# Patient Record
Sex: MALE | Race: WHITE | Employment: FULL TIME | URBAN - METROPOLITAN AREA
[De-identification: names, ages, dates, MRNs, and addresses within clinical notes are randomized per-mention and may not be internally consistent; named-entity substitution may affect disease eponyms.]

---

## 2017-08-13 ENCOUNTER — APPOINTMENT (OUTPATIENT)
Dept: GENERAL RADIOLOGY | Age: 52
End: 2017-08-13
Payer: COMMERCIAL

## 2017-08-13 ENCOUNTER — HOSPITAL ENCOUNTER (EMERGENCY)
Age: 52
Discharge: HOME | End: 2017-08-13
Attending: EMERGENCY MEDICINE
Payer: COMMERCIAL

## 2017-08-13 VITALS
DIASTOLIC BLOOD PRESSURE: 84 MMHG | RESPIRATION RATE: 16 BRPM | BODY MASS INDEX: 28 KG/M2 | HEIGHT: 71 IN | SYSTOLIC BLOOD PRESSURE: 149 MMHG | OXYGEN SATURATION: 97 % | TEMPERATURE: 98.2 F | WEIGHT: 200 LBS | HEART RATE: 78 BPM

## 2017-08-13 DIAGNOSIS — M77.9 TENDONITIS: Primary | ICD-10-CM

## 2017-08-13 PROCEDURE — 73110 X-RAY EXAM OF WRIST: CPT

## 2017-08-13 PROCEDURE — 6360000002 HC RX W HCPCS: Performed by: EMERGENCY MEDICINE

## 2017-08-13 PROCEDURE — 99283 EMERGENCY DEPT VISIT LOW MDM: CPT

## 2017-08-13 PROCEDURE — 96372 THER/PROPH/DIAG INJ SC/IM: CPT

## 2017-08-13 RX ORDER — NAPROXEN 500 MG/1
500 TABLET ORAL 2 TIMES DAILY WITH MEALS
Qty: 10 TABLET | Refills: 0 | Status: SHIPPED | OUTPATIENT
Start: 2017-08-13 | End: 2021-07-12

## 2017-08-13 RX ORDER — KETOROLAC TROMETHAMINE 30 MG/ML
30 INJECTION, SOLUTION INTRAMUSCULAR; INTRAVENOUS ONCE
Status: COMPLETED | OUTPATIENT
Start: 2017-08-13 | End: 2017-08-13

## 2017-08-13 RX ADMIN — KETOROLAC TROMETHAMINE 30 MG: 30 INJECTION, SOLUTION INTRAMUSCULAR at 21:56

## 2017-08-13 ASSESSMENT — PAIN DESCRIPTION - FREQUENCY: FREQUENCY: CONTINUOUS

## 2017-08-13 ASSESSMENT — ENCOUNTER SYMPTOMS
ABDOMINAL PAIN: 0
NAUSEA: 0
EYE PAIN: 0
BACK PAIN: 0
BLOOD IN STOOL: 0
TROUBLE SWALLOWING: 0
DIARRHEA: 0
WHEEZING: 0
RHINORRHEA: 0
VOMITING: 0
COUGH: 0
CHEST TIGHTNESS: 0
SHORTNESS OF BREATH: 0

## 2017-08-13 ASSESSMENT — PAIN SCALES - GENERAL: PAINLEVEL_OUTOF10: 9

## 2017-08-13 ASSESSMENT — PAIN DESCRIPTION - ONSET: ONSET: SUDDEN

## 2017-08-13 ASSESSMENT — PAIN DESCRIPTION - ORIENTATION: ORIENTATION: LEFT

## 2017-08-13 ASSESSMENT — PAIN DESCRIPTION - LOCATION: LOCATION: WRIST

## 2017-08-13 ASSESSMENT — PAIN DESCRIPTION - DESCRIPTORS: DESCRIPTORS: SHARP;SHOOTING;THROBBING;STABBING

## 2017-08-14 ENCOUNTER — HOSPITAL ENCOUNTER (EMERGENCY)
Age: 52
Discharge: HOME | End: 2017-08-14
Attending: EMERGENCY MEDICINE
Payer: COMMERCIAL

## 2017-08-14 ENCOUNTER — APPOINTMENT (OUTPATIENT)
Dept: GENERAL RADIOLOGY | Age: 52
End: 2017-08-14
Payer: COMMERCIAL

## 2017-08-14 VITALS
HEART RATE: 80 BPM | WEIGHT: 200 LBS | BODY MASS INDEX: 28 KG/M2 | TEMPERATURE: 98.2 F | DIASTOLIC BLOOD PRESSURE: 100 MMHG | SYSTOLIC BLOOD PRESSURE: 190 MMHG | RESPIRATION RATE: 16 BRPM | HEIGHT: 71 IN | OXYGEN SATURATION: 98 %

## 2017-08-14 DIAGNOSIS — S63.095S: ICD-10-CM

## 2017-08-14 DIAGNOSIS — M25.432 WRIST EFFUSION, LEFT: ICD-10-CM

## 2017-08-14 DIAGNOSIS — S60.212A CONTUSION OF LEFT WRIST, INITIAL ENCOUNTER: Primary | ICD-10-CM

## 2017-08-14 PROCEDURE — 73110 X-RAY EXAM OF WRIST: CPT

## 2017-08-14 PROCEDURE — 6370000000 HC RX 637 (ALT 250 FOR IP): Performed by: EMERGENCY MEDICINE

## 2017-08-14 PROCEDURE — 99283 EMERGENCY DEPT VISIT LOW MDM: CPT

## 2017-08-14 PROCEDURE — 6360000002 HC RX W HCPCS: Performed by: EMERGENCY MEDICINE

## 2017-08-14 RX ORDER — DEXAMETHASONE 4 MG/1
8 TABLET ORAL ONCE
Status: COMPLETED | OUTPATIENT
Start: 2017-08-14 | End: 2017-08-14

## 2017-08-14 RX ORDER — HYDROCODONE BITARTRATE AND ACETAMINOPHEN 5; 325 MG/1; MG/1
1 TABLET ORAL EVERY 6 HOURS PRN
Qty: 20 TABLET | Refills: 0 | Status: SHIPPED | OUTPATIENT
Start: 2017-08-14 | End: 2017-08-21

## 2017-08-14 RX ORDER — TRAMADOL HYDROCHLORIDE 50 MG/1
100 TABLET ORAL ONCE
Status: COMPLETED | OUTPATIENT
Start: 2017-08-14 | End: 2017-08-14

## 2017-08-14 RX ADMIN — DEXAMETHASONE 8 MG: 4 TABLET ORAL at 11:52

## 2017-08-14 RX ADMIN — TRAMADOL HYDROCHLORIDE 100 MG: 50 TABLET, COATED ORAL at 11:52

## 2017-08-14 ASSESSMENT — ENCOUNTER SYMPTOMS
DIARRHEA: 0
EYE REDNESS: 0
WHEEZING: 0
VOMITING: 0
STRIDOR: 0
BLOOD IN STOOL: 0
SHORTNESS OF BREATH: 0
SORE THROAT: 0
FACIAL SWELLING: 0
EYE PAIN: 0
COUGH: 0
TROUBLE SWALLOWING: 0
EYE DISCHARGE: 0
VOICE CHANGE: 0
SINUS PRESSURE: 0
BACK PAIN: 0
ABDOMINAL PAIN: 0
CONSTIPATION: 0
CHEST TIGHTNESS: 0
CHOKING: 0

## 2017-08-14 ASSESSMENT — PAIN SCALES - GENERAL
PAINLEVEL_OUTOF10: 10
PAINLEVEL_OUTOF10: 10

## 2017-08-14 ASSESSMENT — PAIN DESCRIPTION - LOCATION: LOCATION: WRIST

## 2017-08-14 ASSESSMENT — PAIN DESCRIPTION - ORIENTATION: ORIENTATION: LEFT

## 2017-08-14 ASSESSMENT — PAIN DESCRIPTION - DESCRIPTORS: DESCRIPTORS: SORE

## 2018-04-12 ENCOUNTER — HOSPITAL ENCOUNTER (EMERGENCY)
Age: 53
Discharge: HOME | End: 2018-04-12
Attending: EMERGENCY MEDICINE
Payer: COMMERCIAL

## 2018-04-12 ENCOUNTER — APPOINTMENT (OUTPATIENT)
Dept: CT IMAGING | Age: 53
End: 2018-04-12
Payer: COMMERCIAL

## 2018-04-12 VITALS
TEMPERATURE: 98.1 F | WEIGHT: 200 LBS | BODY MASS INDEX: 28.63 KG/M2 | OXYGEN SATURATION: 98 % | HEART RATE: 66 BPM | RESPIRATION RATE: 16 BRPM | SYSTOLIC BLOOD PRESSURE: 162 MMHG | HEIGHT: 70 IN | DIASTOLIC BLOOD PRESSURE: 90 MMHG

## 2018-04-12 DIAGNOSIS — K59.00 OBSTIPATION: Primary | ICD-10-CM

## 2018-04-12 LAB
ALBUMIN SERPL-MCNC: 4.1 G/DL (ref 3.9–4.9)
ALP BLD-CCNC: 86 U/L (ref 35–104)
ALT SERPL-CCNC: 33 U/L (ref 0–41)
ANION GAP SERPL CALCULATED.3IONS-SCNC: 15 MEQ/L (ref 7–13)
AST SERPL-CCNC: 20 U/L (ref 0–40)
BASOPHILS ABSOLUTE: 0.1 K/UL (ref 0–0.2)
BASOPHILS RELATIVE PERCENT: 0.5 %
BILIRUB SERPL-MCNC: 0.6 MG/DL (ref 0–1.2)
BUN BLDV-MCNC: 11 MG/DL (ref 6–20)
CALCIUM SERPL-MCNC: 9.6 MG/DL (ref 8.6–10.2)
CHLORIDE BLD-SCNC: 97 MEQ/L (ref 98–107)
CO2: 25 MEQ/L (ref 22–29)
CREAT SERPL-MCNC: 0.73 MG/DL (ref 0.7–1.2)
EOSINOPHILS ABSOLUTE: 0.4 K/UL (ref 0–0.7)
EOSINOPHILS RELATIVE PERCENT: 3.6 %
GFR AFRICAN AMERICAN: >60
GFR NON-AFRICAN AMERICAN: >60
GLOBULIN: 3.5 G/DL (ref 2.3–3.5)
GLUCOSE BLD-MCNC: 182 MG/DL (ref 74–109)
HCT VFR BLD CALC: 45.9 % (ref 42–52)
HEMOGLOBIN: 15.9 G/DL (ref 14–18)
LIPASE: 19 U/L (ref 13–60)
LYMPHOCYTES ABSOLUTE: 3.7 K/UL (ref 1–4.8)
LYMPHOCYTES RELATIVE PERCENT: 31.6 %
MAGNESIUM: 1.9 MG/DL (ref 1.7–2.3)
MCH RBC QN AUTO: 30.6 PG (ref 27–31.3)
MCHC RBC AUTO-ENTMCNC: 34.6 % (ref 33–37)
MCV RBC AUTO: 88.6 FL (ref 80–100)
MONOCYTES ABSOLUTE: 0.8 K/UL (ref 0.2–0.8)
MONOCYTES RELATIVE PERCENT: 7.3 %
NEUTROPHILS ABSOLUTE: 6.6 K/UL (ref 1.4–6.5)
NEUTROPHILS RELATIVE PERCENT: 57 %
PDW BLD-RTO: 12.8 % (ref 11.5–14.5)
PLATELET # BLD: 208 K/UL (ref 130–400)
POTASSIUM SERPL-SCNC: 4.3 MEQ/L (ref 3.5–5.1)
RBC # BLD: 5.18 M/UL (ref 4.7–6.1)
SODIUM BLD-SCNC: 137 MEQ/L (ref 132–144)
TOTAL PROTEIN: 7.6 G/DL (ref 6.4–8.1)
WBC # BLD: 11.6 K/UL (ref 4.8–10.8)

## 2018-04-12 PROCEDURE — 83735 ASSAY OF MAGNESIUM: CPT

## 2018-04-12 PROCEDURE — 80053 COMPREHEN METABOLIC PANEL: CPT

## 2018-04-12 PROCEDURE — 99284 EMERGENCY DEPT VISIT MOD MDM: CPT

## 2018-04-12 PROCEDURE — 74176 CT ABD & PELVIS W/O CONTRAST: CPT

## 2018-04-12 PROCEDURE — 83690 ASSAY OF LIPASE: CPT

## 2018-04-12 PROCEDURE — 6360000002 HC RX W HCPCS: Performed by: EMERGENCY MEDICINE

## 2018-04-12 PROCEDURE — 36415 COLL VENOUS BLD VENIPUNCTURE: CPT

## 2018-04-12 PROCEDURE — 85025 COMPLETE CBC W/AUTO DIFF WBC: CPT

## 2018-04-12 PROCEDURE — 96374 THER/PROPH/DIAG INJ IV PUSH: CPT

## 2018-04-12 PROCEDURE — 2580000003 HC RX 258: Performed by: EMERGENCY MEDICINE

## 2018-04-12 RX ORDER — DOCUSATE SODIUM 100 MG/1
100 CAPSULE, LIQUID FILLED ORAL 2 TIMES DAILY
Qty: 30 CAPSULE | Refills: 0 | Status: SHIPPED | OUTPATIENT
Start: 2018-04-12

## 2018-04-12 RX ORDER — ORPHENADRINE CITRATE 100 MG/1
100 TABLET, EXTENDED RELEASE ORAL 2 TIMES DAILY
Qty: 20 TABLET | Refills: 0 | Status: SHIPPED | OUTPATIENT
Start: 2018-04-12 | End: 2018-04-22

## 2018-04-12 RX ORDER — KETOROLAC TROMETHAMINE 10 MG/1
10 TABLET, FILM COATED ORAL EVERY 6 HOURS PRN
Qty: 20 TABLET | Refills: 0 | Status: SHIPPED | OUTPATIENT
Start: 2018-04-12 | End: 2018-06-21 | Stop reason: ALTCHOICE

## 2018-04-12 RX ORDER — 0.9 % SODIUM CHLORIDE 0.9 %
1000 INTRAVENOUS SOLUTION INTRAVENOUS ONCE
Status: COMPLETED | OUTPATIENT
Start: 2018-04-12 | End: 2018-04-12

## 2018-04-12 RX ORDER — KETOROLAC TROMETHAMINE 30 MG/ML
30 INJECTION, SOLUTION INTRAMUSCULAR; INTRAVENOUS ONCE
Status: COMPLETED | OUTPATIENT
Start: 2018-04-12 | End: 2018-04-12

## 2018-04-12 RX ADMIN — KETOROLAC TROMETHAMINE 30 MG: 30 INJECTION, SOLUTION INTRAMUSCULAR at 16:27

## 2018-04-12 RX ADMIN — SODIUM CHLORIDE 1000 ML: 9 INJECTION, SOLUTION INTRAVENOUS at 16:27

## 2018-04-12 ASSESSMENT — PAIN SCALES - GENERAL
PAINLEVEL_OUTOF10: 4
PAINLEVEL_OUTOF10: 8
PAINLEVEL_OUTOF10: 10

## 2018-04-12 ASSESSMENT — ENCOUNTER SYMPTOMS
COUGH: 0
VOMITING: 0
ABDOMINAL PAIN: 0
RHINORRHEA: 0
EYE PAIN: 0
PHOTOPHOBIA: 0
SORE THROAT: 0
APNEA: 0
CONSTIPATION: 0
NAUSEA: 0
WHEEZING: 0
DIARRHEA: 0
SHORTNESS OF BREATH: 0
SINUS PRESSURE: 0
ABDOMINAL DISTENTION: 0
COLOR CHANGE: 0
BACK PAIN: 0

## 2018-04-12 ASSESSMENT — PAIN DESCRIPTION - DESCRIPTORS
DESCRIPTORS: STABBING
DESCRIPTORS: ACHING
DESCRIPTORS: ACHING

## 2018-04-12 ASSESSMENT — PAIN - FUNCTIONAL ASSESSMENT: PAIN_FUNCTIONAL_ASSESSMENT: 0-10

## 2018-04-12 ASSESSMENT — PAIN DESCRIPTION - ORIENTATION
ORIENTATION: LEFT
ORIENTATION: RIGHT
ORIENTATION: LEFT

## 2018-04-12 ASSESSMENT — PAIN DESCRIPTION - LOCATION
LOCATION: FLANK

## 2018-04-12 ASSESSMENT — PAIN DESCRIPTION - PAIN TYPE: TYPE: ACUTE PAIN

## 2018-04-12 ASSESSMENT — PAIN DESCRIPTION - PROGRESSION: CLINICAL_PROGRESSION: GRADUALLY IMPROVING

## 2018-04-12 ASSESSMENT — PAIN DESCRIPTION - ONSET: ONSET: SUDDEN

## 2018-04-12 ASSESSMENT — PAIN DESCRIPTION - FREQUENCY
FREQUENCY: CONTINUOUS
FREQUENCY: CONTINUOUS

## 2018-04-16 ENCOUNTER — OFFICE VISIT (OUTPATIENT)
Dept: FAMILY MEDICINE CLINIC | Age: 53
End: 2018-04-16
Payer: COMMERCIAL

## 2018-04-16 VITALS
RESPIRATION RATE: 16 BRPM | WEIGHT: 213 LBS | SYSTOLIC BLOOD PRESSURE: 152 MMHG | HEIGHT: 71 IN | OXYGEN SATURATION: 98 % | DIASTOLIC BLOOD PRESSURE: 90 MMHG | HEART RATE: 84 BPM | BODY MASS INDEX: 29.82 KG/M2 | TEMPERATURE: 96.9 F

## 2018-04-16 DIAGNOSIS — Z00.00 ANNUAL PHYSICAL EXAM: ICD-10-CM

## 2018-04-16 DIAGNOSIS — L81.4 SOLAR LENTIGO: ICD-10-CM

## 2018-04-16 DIAGNOSIS — R20.2 PARESTHESIA OF LEFT LOWER EXTREMITY: ICD-10-CM

## 2018-04-16 DIAGNOSIS — R73.9 HYPERGLYCEMIA: ICD-10-CM

## 2018-04-16 DIAGNOSIS — M47.817 LUMBOSACRAL SPONDYLOSIS WITHOUT MYELOPATHY: ICD-10-CM

## 2018-04-16 DIAGNOSIS — M72.0 DUPUYTREN'S CONTRACTURE OF HAND: ICD-10-CM

## 2018-04-16 DIAGNOSIS — M67.471 GANGLION CYST OF RIGHT FOOT: ICD-10-CM

## 2018-04-16 DIAGNOSIS — M17.11 ARTHRITIS OF RIGHT KNEE: ICD-10-CM

## 2018-04-16 DIAGNOSIS — Z13.220 SCREENING, LIPID: ICD-10-CM

## 2018-04-16 DIAGNOSIS — Z96.652 S/P TKR (TOTAL KNEE REPLACEMENT), LEFT: Primary | ICD-10-CM

## 2018-04-16 PROCEDURE — 3017F COLORECTAL CA SCREEN DOC REV: CPT | Performed by: FAMILY MEDICINE

## 2018-04-16 PROCEDURE — 99204 OFFICE O/P NEW MOD 45 MIN: CPT | Performed by: FAMILY MEDICINE

## 2018-04-16 PROCEDURE — G8427 DOCREV CUR MEDS BY ELIG CLIN: HCPCS | Performed by: FAMILY MEDICINE

## 2018-04-16 PROCEDURE — G8419 CALC BMI OUT NRM PARAM NOF/U: HCPCS | Performed by: FAMILY MEDICINE

## 2018-04-16 PROCEDURE — 4004F PT TOBACCO SCREEN RCVD TLK: CPT | Performed by: FAMILY MEDICINE

## 2018-04-16 ASSESSMENT — PATIENT HEALTH QUESTIONNAIRE - PHQ9
1. LITTLE INTEREST OR PLEASURE IN DOING THINGS: 0
SUM OF ALL RESPONSES TO PHQ QUESTIONS 1-9: 0
2. FEELING DOWN, DEPRESSED OR HOPELESS: 0
SUM OF ALL RESPONSES TO PHQ9 QUESTIONS 1 & 2: 0

## 2018-05-22 LAB
ALBUMIN SERPL-MCNC: 4.3 G/DL
ALP BLD-CCNC: 84 U/L
ALT SERPL-CCNC: 34 U/L
ANION GAP SERPL CALCULATED.3IONS-SCNC: NORMAL MMOL/L
AST SERPL-CCNC: 23 U/L
AVERAGE GLUCOSE: NORMAL
BASOPHILS ABSOLUTE: 0.1 /ΜL
BASOPHILS RELATIVE PERCENT: 1 %
BILIRUB SERPL-MCNC: 0.4 MG/DL (ref 0.1–1.4)
BUN BLDV-MCNC: 15 MG/DL
CALCIUM SERPL-MCNC: 9.3 MG/DL
CHLORIDE BLD-SCNC: 100 MMOL/L
CHOLESTEROL, TOTAL: 177 MG/DL
CHOLESTEROL/HDL RATIO: NORMAL
CO2: 23 MMOL/L
CREAT SERPL-MCNC: 0.8 MG/DL
EOSINOPHILS ABSOLUTE: 0.5 /ΜL
EOSINOPHILS RELATIVE PERCENT: 5 %
GFR CALCULATED: NORMAL
GLUCOSE BLD-MCNC: 159 MG/DL
HBA1C MFR BLD: 8.7 %
HCT VFR BLD CALC: 44.7 % (ref 41–53)
HDLC SERPL-MCNC: 40 MG/DL (ref 35–70)
HEMOGLOBIN: 15.7 G/DL (ref 13.5–17.5)
LDL CHOLESTEROL CALCULATED: 111 MG/DL (ref 0–160)
LYMPHOCYTES ABSOLUTE: 3.3 /ΜL
LYMPHOCYTES RELATIVE PERCENT: 34 %
MCH RBC QN AUTO: 30.8 PG
MCHC RBC AUTO-ENTMCNC: 35.1 G/DL
MCV RBC AUTO: 88 FL
MONOCYTES ABSOLUTE: 0.5 /ΜL
MONOCYTES RELATIVE PERCENT: 5 %
NEUTROPHILS ABSOLUTE: 5.5 /ΜL
NEUTROPHILS RELATIVE PERCENT: 55 %
PDW BLD-RTO: 13.2 %
PLATELET # BLD: 261 K/ΜL
PMV BLD AUTO: NORMAL FL
POTASSIUM SERPL-SCNC: 4.7 MMOL/L
RBC # BLD: 5.09 10^6/ΜL
SODIUM BLD-SCNC: 138 MMOL/L
TOTAL PROTEIN: 7.2
TRIGL SERPL-MCNC: 131 MG/DL
VLDLC SERPL CALC-MCNC: 26 MG/DL
WBC # BLD: 9.9 10^3/ML

## 2018-05-24 ENCOUNTER — OFFICE VISIT (OUTPATIENT)
Dept: FAMILY MEDICINE CLINIC | Age: 53
End: 2018-05-24
Payer: COMMERCIAL

## 2018-05-24 VITALS
HEIGHT: 71 IN | BODY MASS INDEX: 29.09 KG/M2 | WEIGHT: 207.8 LBS | HEART RATE: 82 BPM | DIASTOLIC BLOOD PRESSURE: 100 MMHG | SYSTOLIC BLOOD PRESSURE: 150 MMHG | OXYGEN SATURATION: 96 % | TEMPERATURE: 98.4 F

## 2018-05-24 DIAGNOSIS — E11.9 TYPE 2 DIABETES MELLITUS WITHOUT COMPLICATION, WITHOUT LONG-TERM CURRENT USE OF INSULIN (HCC): Primary | ICD-10-CM

## 2018-05-24 DIAGNOSIS — M54.16 LEFT LUMBAR RADICULOPATHY: ICD-10-CM

## 2018-05-24 DIAGNOSIS — M47.817 LUMBOSACRAL SPONDYLOSIS WITHOUT MYELOPATHY: ICD-10-CM

## 2018-05-24 PROCEDURE — 99214 OFFICE O/P EST MOD 30 MIN: CPT | Performed by: FAMILY MEDICINE

## 2018-05-24 PROCEDURE — G8428 CUR MEDS NOT DOCUMENT: HCPCS | Performed by: FAMILY MEDICINE

## 2018-05-24 PROCEDURE — 3017F COLORECTAL CA SCREEN DOC REV: CPT | Performed by: FAMILY MEDICINE

## 2018-05-24 PROCEDURE — 4004F PT TOBACCO SCREEN RCVD TLK: CPT | Performed by: FAMILY MEDICINE

## 2018-05-24 PROCEDURE — G8419 CALC BMI OUT NRM PARAM NOF/U: HCPCS | Performed by: FAMILY MEDICINE

## 2018-05-24 PROCEDURE — 2022F DILAT RTA XM EVC RTNOPTHY: CPT | Performed by: FAMILY MEDICINE

## 2018-05-24 PROCEDURE — 3045F PR MOST RECENT HEMOGLOBIN A1C LEVEL 7.0-9.0%: CPT | Performed by: FAMILY MEDICINE

## 2018-05-24 RX ORDER — TRAMADOL HYDROCHLORIDE 50 MG/1
50 TABLET ORAL EVERY 6 HOURS PRN
Qty: 28 TABLET | Refills: 0 | Status: SHIPPED | OUTPATIENT
Start: 2018-05-24 | End: 2018-05-31

## 2018-05-24 RX ORDER — METFORMIN HYDROCHLORIDE 500 MG/1
500 TABLET, EXTENDED RELEASE ORAL
Qty: 30 TABLET | Refills: 5 | Status: SHIPPED | OUTPATIENT
Start: 2018-05-24 | End: 2019-05-24

## 2018-06-21 ENCOUNTER — OFFICE VISIT (OUTPATIENT)
Dept: FAMILY MEDICINE CLINIC | Age: 53
End: 2018-06-21
Payer: COMMERCIAL

## 2018-06-21 VITALS
HEIGHT: 71 IN | OXYGEN SATURATION: 98 % | DIASTOLIC BLOOD PRESSURE: 84 MMHG | BODY MASS INDEX: 28.92 KG/M2 | HEART RATE: 66 BPM | TEMPERATURE: 99.1 F | WEIGHT: 206.6 LBS | SYSTOLIC BLOOD PRESSURE: 164 MMHG

## 2018-06-21 DIAGNOSIS — E11.9 TYPE 2 DIABETES MELLITUS WITHOUT COMPLICATION, WITHOUT LONG-TERM CURRENT USE OF INSULIN (HCC): Primary | ICD-10-CM

## 2018-06-21 DIAGNOSIS — I10 ESSENTIAL HYPERTENSION: ICD-10-CM

## 2018-06-21 DIAGNOSIS — G89.29 CHRONIC PAIN OF LEFT KNEE: ICD-10-CM

## 2018-06-21 DIAGNOSIS — M47.817 LUMBOSACRAL SPONDYLOSIS WITHOUT MYELOPATHY: ICD-10-CM

## 2018-06-21 DIAGNOSIS — M25.562 CHRONIC PAIN OF LEFT KNEE: ICD-10-CM

## 2018-06-21 PROCEDURE — 2022F DILAT RTA XM EVC RTNOPTHY: CPT | Performed by: FAMILY MEDICINE

## 2018-06-21 PROCEDURE — G8427 DOCREV CUR MEDS BY ELIG CLIN: HCPCS | Performed by: FAMILY MEDICINE

## 2018-06-21 PROCEDURE — 3017F COLORECTAL CA SCREEN DOC REV: CPT | Performed by: FAMILY MEDICINE

## 2018-06-21 PROCEDURE — G8419 CALC BMI OUT NRM PARAM NOF/U: HCPCS | Performed by: FAMILY MEDICINE

## 2018-06-21 PROCEDURE — 99214 OFFICE O/P EST MOD 30 MIN: CPT | Performed by: FAMILY MEDICINE

## 2018-06-21 PROCEDURE — 3045F PR MOST RECENT HEMOGLOBIN A1C LEVEL 7.0-9.0%: CPT | Performed by: FAMILY MEDICINE

## 2018-06-21 PROCEDURE — 4004F PT TOBACCO SCREEN RCVD TLK: CPT | Performed by: FAMILY MEDICINE

## 2018-06-21 RX ORDER — LISINOPRIL 20 MG/1
20 TABLET ORAL DAILY
Qty: 30 TABLET | Refills: 5 | Status: SHIPPED | OUTPATIENT
Start: 2018-06-21

## 2018-06-21 RX ORDER — HYDROCODONE BITARTRATE AND ACETAMINOPHEN 5; 325 MG/1; MG/1
1 TABLET ORAL EVERY 6 HOURS PRN
Qty: 28 TABLET | Refills: 0 | Status: SHIPPED | OUTPATIENT
Start: 2018-06-21 | End: 2018-06-28

## 2018-06-25 DIAGNOSIS — R20.2 PARESTHESIA OF LEFT LOWER EXTREMITY: Primary | ICD-10-CM

## 2018-07-22 NOTE — PROGRESS NOTES
Labs are abnormal.  Drug screen is abnormal.  Positive for oxycodone and negative for hydrocodone. Please question patient about where he is obtaining the oxycodone from.

## 2018-07-23 ENCOUNTER — TELEPHONE (OUTPATIENT)
Dept: FAMILY MEDICINE CLINIC | Age: 53
End: 2018-07-23

## 2018-07-23 NOTE — TELEPHONE ENCOUNTER
THE PATIENT RETURNED A PHONE CALL HE STATED WAS RECEIVED FROM A MA FROM OUR OFFICE INQUIRING ABOUT HIS CURRENT MEDICATIONS. THE PATIENT IS SCHEDULED TO COME IN FOR AN APPOINTMENT 7/25/18 AND WILL PROVIDE THE MEDICATIONS AT THAT TIME.

## 2020-09-09 ENCOUNTER — APPOINTMENT (OUTPATIENT)
Dept: CT IMAGING | Age: 55
End: 2020-09-09
Payer: COMMERCIAL

## 2020-09-09 ENCOUNTER — APPOINTMENT (OUTPATIENT)
Dept: GENERAL RADIOLOGY | Age: 55
End: 2020-09-09
Payer: COMMERCIAL

## 2020-09-09 ENCOUNTER — HOSPITAL ENCOUNTER (EMERGENCY)
Age: 55
Discharge: HOME | End: 2020-09-09
Payer: COMMERCIAL

## 2020-09-09 VITALS
WEIGHT: 200 LBS | HEART RATE: 56 BPM | RESPIRATION RATE: 20 BRPM | BODY MASS INDEX: 28 KG/M2 | HEIGHT: 71 IN | OXYGEN SATURATION: 99 % | SYSTOLIC BLOOD PRESSURE: 182 MMHG | TEMPERATURE: 97.9 F | DIASTOLIC BLOOD PRESSURE: 97 MMHG

## 2020-09-09 LAB
ALBUMIN SERPL-MCNC: 3.9 G/DL (ref 3.5–4.6)
ALP BLD-CCNC: 97 U/L (ref 35–104)
ALT SERPL-CCNC: 20 U/L (ref 0–41)
ANION GAP SERPL CALCULATED.3IONS-SCNC: 9 MEQ/L (ref 9–15)
AST SERPL-CCNC: 39 U/L (ref 0–40)
BASOPHILS ABSOLUTE: 0.1 K/UL (ref 0–0.2)
BASOPHILS RELATIVE PERCENT: 1.1 %
BILIRUB SERPL-MCNC: 0.4 MG/DL (ref 0.2–0.7)
BILIRUBIN URINE: NEGATIVE
BLOOD, URINE: NEGATIVE
BUN BLDV-MCNC: 14 MG/DL (ref 6–20)
CALCIUM SERPL-MCNC: 8.9 MG/DL (ref 8.5–9.9)
CHLORIDE BLD-SCNC: 100 MEQ/L (ref 95–107)
CLARITY: CLEAR
CO2: 27 MEQ/L (ref 20–31)
COLOR: YELLOW
CREAT SERPL-MCNC: 0.68 MG/DL (ref 0.7–1.2)
EKG ATRIAL RATE: 66 BPM
EKG P AXIS: 82 DEGREES
EKG P-R INTERVAL: 152 MS
EKG Q-T INTERVAL: 406 MS
EKG QRS DURATION: 82 MS
EKG QTC CALCULATION (BAZETT): 425 MS
EKG R AXIS: 77 DEGREES
EKG T AXIS: 59 DEGREES
EKG VENTRICULAR RATE: 66 BPM
EOSINOPHILS ABSOLUTE: 0.4 K/UL (ref 0–0.7)
EOSINOPHILS RELATIVE PERCENT: 4.3 %
GFR AFRICAN AMERICAN: >60
GFR NON-AFRICAN AMERICAN: >60
GLOBULIN: 3.5 G/DL (ref 2.3–3.5)
GLUCOSE BLD-MCNC: 130 MG/DL (ref 70–99)
GLUCOSE URINE: 250 MG/DL
HCT VFR BLD CALC: 47.2 % (ref 42–52)
HEMOGLOBIN: 16.1 G/DL (ref 14–18)
KETONES, URINE: NEGATIVE MG/DL
LEUKOCYTE ESTERASE, URINE: NEGATIVE
LIPASE: 21 U/L (ref 12–95)
LYMPHOCYTES ABSOLUTE: 2.6 K/UL (ref 1–4.8)
LYMPHOCYTES RELATIVE PERCENT: 24.8 %
MAGNESIUM: 2 MG/DL (ref 1.7–2.4)
MCH RBC QN AUTO: 31 PG (ref 27–31.3)
MCHC RBC AUTO-ENTMCNC: 34.2 % (ref 33–37)
MCV RBC AUTO: 90.8 FL (ref 80–100)
MONOCYTES ABSOLUTE: 0.9 K/UL (ref 0.2–0.8)
MONOCYTES RELATIVE PERCENT: 8.8 %
NEUTROPHILS ABSOLUTE: 6.4 K/UL (ref 1.4–6.5)
NEUTROPHILS RELATIVE PERCENT: 61 %
NITRITE, URINE: NEGATIVE
PDW BLD-RTO: 14 % (ref 11.5–14.5)
PH UA: 5.5 (ref 5–9)
PLATELET # BLD: 202 K/UL (ref 130–400)
POTASSIUM SERPL-SCNC: 3.9 MEQ/L (ref 3.4–4.9)
POTASSIUM SERPL-SCNC: 5.7 MEQ/L (ref 3.4–4.9)
PROTEIN UA: NEGATIVE MG/DL
RBC # BLD: 5.2 M/UL (ref 4.7–6.1)
SODIUM BLD-SCNC: 136 MEQ/L (ref 135–144)
SPECIFIC GRAVITY UA: 1.03 (ref 1–1.03)
TOTAL PROTEIN: 7.4 G/DL (ref 6.3–8)
TROPONIN: <0.01 NG/ML (ref 0–0.01)
URINE REFLEX TO CULTURE: ABNORMAL
UROBILINOGEN, URINE: 1 E.U./DL
WBC # BLD: 10.4 K/UL (ref 4.8–10.8)

## 2020-09-09 PROCEDURE — 83735 ASSAY OF MAGNESIUM: CPT

## 2020-09-09 PROCEDURE — 96361 HYDRATE IV INFUSION ADD-ON: CPT

## 2020-09-09 PROCEDURE — 84132 ASSAY OF SERUM POTASSIUM: CPT

## 2020-09-09 PROCEDURE — 85025 COMPLETE CBC W/AUTO DIFF WBC: CPT

## 2020-09-09 PROCEDURE — 71045 X-RAY EXAM CHEST 1 VIEW: CPT

## 2020-09-09 PROCEDURE — 36415 COLL VENOUS BLD VENIPUNCTURE: CPT

## 2020-09-09 PROCEDURE — 80053 COMPREHEN METABOLIC PANEL: CPT

## 2020-09-09 PROCEDURE — 99285 EMERGENCY DEPT VISIT HI MDM: CPT

## 2020-09-09 PROCEDURE — 96374 THER/PROPH/DIAG INJ IV PUSH: CPT

## 2020-09-09 PROCEDURE — 84484 ASSAY OF TROPONIN QUANT: CPT

## 2020-09-09 PROCEDURE — 6370000000 HC RX 637 (ALT 250 FOR IP): Performed by: STUDENT IN AN ORGANIZED HEALTH CARE EDUCATION/TRAINING PROGRAM

## 2020-09-09 PROCEDURE — 2580000003 HC RX 258: Performed by: STUDENT IN AN ORGANIZED HEALTH CARE EDUCATION/TRAINING PROGRAM

## 2020-09-09 PROCEDURE — 93005 ELECTROCARDIOGRAM TRACING: CPT | Performed by: STUDENT IN AN ORGANIZED HEALTH CARE EDUCATION/TRAINING PROGRAM

## 2020-09-09 PROCEDURE — 81003 URINALYSIS AUTO W/O SCOPE: CPT

## 2020-09-09 PROCEDURE — 96375 TX/PRO/DX INJ NEW DRUG ADDON: CPT

## 2020-09-09 PROCEDURE — 74176 CT ABD & PELVIS W/O CONTRAST: CPT

## 2020-09-09 PROCEDURE — 83690 ASSAY OF LIPASE: CPT

## 2020-09-09 PROCEDURE — 6360000002 HC RX W HCPCS: Performed by: STUDENT IN AN ORGANIZED HEALTH CARE EDUCATION/TRAINING PROGRAM

## 2020-09-09 RX ORDER — LISINOPRIL 10 MG/1
20 TABLET ORAL ONCE
Status: COMPLETED | OUTPATIENT
Start: 2020-09-09 | End: 2020-09-09

## 2020-09-09 RX ORDER — ONDANSETRON 2 MG/ML
4 INJECTION INTRAMUSCULAR; INTRAVENOUS ONCE
Status: COMPLETED | OUTPATIENT
Start: 2020-09-09 | End: 2020-09-09

## 2020-09-09 RX ORDER — MORPHINE SULFATE 2 MG/ML
4 INJECTION, SOLUTION INTRAMUSCULAR; INTRAVENOUS
Status: DISCONTINUED | OUTPATIENT
Start: 2020-09-09 | End: 2020-09-09 | Stop reason: HOSPADM

## 2020-09-09 RX ORDER — KETOROLAC TROMETHAMINE 30 MG/ML
30 INJECTION, SOLUTION INTRAMUSCULAR; INTRAVENOUS ONCE
Status: COMPLETED | OUTPATIENT
Start: 2020-09-09 | End: 2020-09-09

## 2020-09-09 RX ORDER — HYDRALAZINE HYDROCHLORIDE 20 MG/ML
5 INJECTION INTRAMUSCULAR; INTRAVENOUS ONCE
Status: DISCONTINUED | OUTPATIENT
Start: 2020-09-09 | End: 2020-09-09

## 2020-09-09 RX ORDER — 0.9 % SODIUM CHLORIDE 0.9 %
1000 INTRAVENOUS SOLUTION INTRAVENOUS ONCE
Status: COMPLETED | OUTPATIENT
Start: 2020-09-09 | End: 2020-09-09

## 2020-09-09 RX ADMIN — KETOROLAC TROMETHAMINE 30 MG: 30 INJECTION, SOLUTION INTRAMUSCULAR at 19:07

## 2020-09-09 RX ADMIN — ONDANSETRON 4 MG: 2 INJECTION INTRAMUSCULAR; INTRAVENOUS at 19:07

## 2020-09-09 RX ADMIN — LISINOPRIL 20 MG: 10 TABLET ORAL at 20:51

## 2020-09-09 RX ADMIN — SODIUM CHLORIDE 1000 ML: 9 INJECTION, SOLUTION INTRAVENOUS at 19:07

## 2020-09-09 ASSESSMENT — PAIN DESCRIPTION - PROGRESSION: CLINICAL_PROGRESSION: GRADUALLY WORSENING

## 2020-09-09 ASSESSMENT — PAIN DESCRIPTION - DESCRIPTORS: DESCRIPTORS: ACHING

## 2020-09-09 ASSESSMENT — PAIN SCALES - GENERAL
PAINLEVEL_OUTOF10: 3
PAINLEVEL_OUTOF10: 7
PAINLEVEL_OUTOF10: 9

## 2020-09-09 ASSESSMENT — ENCOUNTER SYMPTOMS
BACK PAIN: 0
ABDOMINAL DISTENTION: 0
DIARRHEA: 0
COUGH: 0
ABDOMINAL PAIN: 0
WHEEZING: 0
VOMITING: 0
SHORTNESS OF BREATH: 0
ANAL BLEEDING: 0
PHOTOPHOBIA: 0
NAUSEA: 0
CONSTIPATION: 0
BLOOD IN STOOL: 0

## 2020-09-09 ASSESSMENT — PAIN DESCRIPTION - ORIENTATION
ORIENTATION: LEFT
ORIENTATION: LEFT

## 2020-09-09 ASSESSMENT — PAIN DESCRIPTION - PAIN TYPE
TYPE: ACUTE PAIN
TYPE: ACUTE PAIN

## 2020-09-09 ASSESSMENT — PAIN DESCRIPTION - LOCATION
LOCATION: FLANK
LOCATION: FLANK

## 2020-09-09 ASSESSMENT — PAIN DESCRIPTION - FREQUENCY: FREQUENCY: INTERMITTENT

## 2020-09-09 NOTE — ED PROVIDER NOTES
[address] [facility] ED  EMERGENCY DEPARTMENT ENCOUNTER      Pt Name: Dex Dalton  MRN: [id number]  Latriciagfcedric 1965  Date of evaluation: 9/9/2020  Provider: Abhinav Cheng, [facility]       Chief Complaint   Patient presents with    Flank Pain     started last night had left flank pain getting worse         HISTORY OF PRESENT ILLNESS   (Location/Symptom, Timing/Onset, Context/Setting, Quality, Duration, Modifying Factors, Severity)  Note limiting factors. Dex Dalton is a 53 y.o. male who per chart review has pmh of TIIDM, HTN presents to the emergency department gradual onset, intermittent, severe, non-radiating L sided flank pain that began last night. Pain is worsening. Denies aggravating or alleviating factors. Has not tried anything for pain. He has a hx of kidney stones several years ago and his pain feels similar. He denies abd pain, nausea, vomiting, diarrhea, hematuria, urinary sx, blood in the stool, fever chills. Pt noted to be hypertensive on arrival, pt states he is supposed to be taking lisinopril but has not taken it for a month. He denies chest pain, sob, headache, visual changes, dizziness, difficulty urinating. HPI    Nursing Notes were reviewed. REVIEW OF SYSTEMS    (2-9 systems for level 4, 10 or more for level 5)     Review of Systems   Constitutional: Negative for chills and fever. HENT: Negative for congestion. Eyes: Negative for photophobia. Respiratory: Negative for cough, shortness of breath and wheezing. Cardiovascular: Negative for chest pain and palpitations. Gastrointestinal: Negative for abdominal distention, abdominal pain, anal bleeding, blood in stool, constipation, diarrhea, nausea and vomiting. Genitourinary: Positive for flank pain. Negative for decreased urine volume, discharge, dysuria, frequency, hematuria, penile pain, penile swelling, scrotal swelling, testicular pain and urgency.    Musculoskeletal: Negative for back pain and myalgias. Skin: Negative for rash. Allergic/Immunologic: Negative for immunocompromised state. Neurological: Negative for dizziness, weakness and headaches. All other systems reviewed and are negative. Except as noted above the remainder of the review of systems was reviewed and negative. PAST MEDICAL HISTORY     Past Medical History:   Diagnosis Date    Hypertension     Osteoarthritis          SURGICAL HISTORY       Past Surgical History:   Procedure Laterality Date    JOINT REPLACEMENT      KNEE SURGERY Left 06/09/2015         CURRENT MEDICATIONS       Discharge Medication List as of 9/9/2020  9:08 PM      CONTINUE these medications which have NOT CHANGED    Details   lisinopril (PRINIVIL;ZESTRIL) 20 MG tablet Take 1 tablet by mouth daily, Disp-30 tablet, R-5Normal      blood glucose test strips (ASCENSIA AUTODISC VI;ONE TOUCH ULTRA TEST VI) strip Disp-50 strip, R-3, NormalTest once daily as directed. SOFT TOUCH LANCETS MISC DAILY Starting Thu 6/21/2018, Disp-100 each, R-3, Normal      metFORMIN (GLUCOPHAGE XR) 500 MG extended release tablet Take 1 tablet by mouth Daily with supper, Disp-30 tablet, R-5Normal      docusate sodium (COLACE) 100 MG capsule Take 1 capsule by mouth 2 times daily, Disp-30 capsule, R-0Print      naproxen (NAPROSYN) 500 MG tablet Take 1 tablet by mouth 2 times daily (with meals), Disp-10 tablet, R-0Print      gabapentin (NEURONTIN) 300 MG capsule Take 1 capsule by mouth 3 times daily Start at night, Disp-90 capsule, R-0             ALLERGIES     Patient has no known allergies.     FAMILY HISTORY       Family History   Problem Relation Age of Onset    High Blood Pressure Mother           SOCIAL HISTORY       Social History     Socioeconomic History    Marital status:      Spouse name: None    Number of children: None    Years of education: None    Highest education level: None   Occupational History    None   Social Needs    Financial resource strain: None    Food insecurity     Worry: None     Inability: None    Transportation needs     Medical: None     Non-medical: None   Tobacco Use    Smoking status: Current Every Day Smoker     Packs/day: 2.00     Types: Cigarettes    Smokeless tobacco: Never Used   Substance and Sexual Activity    Alcohol use: No     Alcohol/week: 0.0 standard drinks     Comment: quit 2000.  Drug use: No    Sexual activity: Yes   Lifestyle    Physical activity     Days per week: None     Minutes per session: None    Stress: None   Relationships    Social connections     Talks on phone: None     Gets together: None     Attends Buddhism service: None     Active member of club or organization: None     Attends meetings of clubs or organizations: None     Relationship status: None    Intimate partner violence     Fear of current or ex partner: None     Emotionally abused: None     Physically abused: None     Forced sexual activity: None   Other Topics Concern    None   Social History Narrative    None       SCREENINGS                        PHYSICAL EXAM    (up to 7 for level 4, 8 or more for level 5)     ED Triage Vitals [09/09/20 1808]   BP Temp Temp Source Pulse Resp SpO2 Height Weight   (!) 237/190 97.9 °F (36.6 °C) Oral 70 18 98 % 5' 11\" (1.803 m) 200 lb (90.7 kg)       Physical Exam  Constitutional:       General: He is not in acute distress. Appearance: He is well-developed. He is not toxic-appearing. HENT:      Head: Normocephalic and atraumatic. Nose: Nose normal.      Mouth/Throat:      Mouth: Mucous membranes are moist.   Eyes:      Pupils: Pupils are equal, round, and reactive to light. Neck:      Musculoskeletal: Normal range of motion. Cardiovascular:      Rate and Rhythm: Normal rate and regular rhythm. Pulses: Normal pulses. Heart sounds: No murmur. No friction rub. No gallop. Pulmonary:      Effort: Pulmonary effort is normal.      Breath sounds: Normal breath sounds.  No wheezing, rhonchi or rales. Abdominal:      General: Bowel sounds are normal. There is no distension. Palpations: Abdomen is soft. There is no mass. Tenderness: There is no abdominal tenderness. There is left CVA tenderness. There is no right CVA tenderness, guarding or rebound. Hernia: No hernia is present. Musculoskeletal:         General: No swelling. Skin:     General: Skin is warm and dry. Capillary Refill: Capillary refill takes less than 2 seconds. Neurological:      General: No focal deficit present. Mental Status: He is alert and oriented to person, place, and time. DIAGNOSTIC RESULTS     EKG: All EKG's are interpreted by the Emergency Department Physician who either signs or Co-signs this chart in the absence of a cardiologist.    EKG shows NSR with HR 66, normal axis, normal intervals, no ST changes. No prior to compare. RADIOLOGY:   Non-plain film images such as CT, Ultrasound and MRI are read by the radiologist. Plain radiographic images are visualized and preliminarily interpreted by the emergency physician with the below findings:        Interpretation per the Radiologist below, if available at the time of this note:    CT ABDOMEN PELVIS WO CONTRAST Additional Contrast? None   Final Result   NO OBSTRUCTIVE UROPATHY. NO ACUTE PATHOLOGY IN THE ABDOMEN OR PELVIS. Bilateral nephrolithiasis            XR CHEST PORTABLE   Final Result   NO ACUTE CARDIOPULMONARY ABNORMALITY.                ED BEDSIDE ULTRASOUND:   Performed by ED Physician - none    LABS:  Labs Reviewed   COMPREHENSIVE METABOLIC PANEL - Abnormal; Notable for the following components:       Result Value    Potassium 5.7 (*)     Glucose 130 (*)     CREATININE 0.68 (*)     All other components within normal limits   CBC WITH AUTO DIFFERENTIAL - Abnormal; Notable for the following components:    Monocytes Absolute 0.9 (*)     All other components within normal limits   URINE RT REFLEX TO CULTURE - time was 0 minutes, excluding separately reportable procedures. There was a high probability of clinically significant/life threatening deterioration in the patient's condition which required my urgent intervention. CONSULTS:  None    PROCEDURES:  Unless otherwise noted below, none     Procedures        FINAL IMPRESSION      1. Flank pain    2. Essential hypertension          DISPOSITION/PLAN   DISPOSITION        PATIENT REFERRED TO:  James Hunter MD  [address] [phone]    Schedule an appointment as soon as possible for a visit in 1 day      [facility]) ED  [address] [place] [address]  [phone]  Go to   As needed, If symptoms worsen      DISCHARGE MEDICATIONS:  Discharge Medication List as of 9/9/2020  9:08 PM        Controlled Substances Monitoring:     RX Monitoring 6/21/2018   Attestation -   Periodic Controlled Substance Monitoring Random urine drug screen sent today.        (Please note that portions of this note were completed with a voice recognition program.  Efforts were made to edit the dictations but occasionally words are mis-transcribed.)    Briseida Ramirez PA-C (electronically signed)             Briseida Ramirez PA-C  09/11/20 0234

## 2020-09-09 NOTE — ED NOTES
EKG done per protocol for hypertension. Pt denies any chest pain or discomfort. Pt states he has not taken his BP meds for two years.        Sandhya Ellsworth RN  09/09/20 0757

## 2020-09-10 NOTE — ED NOTES
D/C instructions given to patient no questions ask patient vabalized understanding and ambulated from ED without any complications     Orlin Fagan RN  09/09/20 9619

## 2020-09-11 PROCEDURE — 93010 ELECTROCARDIOGRAM REPORT: CPT | Performed by: INTERNAL MEDICINE

## 2021-02-07 ENCOUNTER — HOSPITAL ENCOUNTER (EMERGENCY)
Age: 56
Discharge: HOME | End: 2021-02-07
Attending: EMERGENCY MEDICINE
Payer: COMMERCIAL

## 2021-02-07 ENCOUNTER — APPOINTMENT (OUTPATIENT)
Dept: GENERAL RADIOLOGY | Age: 56
End: 2021-02-07
Payer: COMMERCIAL

## 2021-02-07 VITALS
HEIGHT: 71 IN | WEIGHT: 200 LBS | TEMPERATURE: 97.7 F | HEART RATE: 65 BPM | OXYGEN SATURATION: 100 % | RESPIRATION RATE: 18 BRPM | BODY MASS INDEX: 28 KG/M2 | SYSTOLIC BLOOD PRESSURE: 173 MMHG | DIASTOLIC BLOOD PRESSURE: 105 MMHG

## 2021-02-07 DIAGNOSIS — M25.432 WRIST SWELLING, LEFT: ICD-10-CM

## 2021-02-07 DIAGNOSIS — M25.532 LEFT WRIST PAIN: Primary | ICD-10-CM

## 2021-02-07 LAB
ALBUMIN SERPL-MCNC: 3.8 G/DL (ref 3.5–4.6)
ALP BLD-CCNC: 113 U/L (ref 35–104)
ALT SERPL-CCNC: 12 U/L (ref 0–41)
ANION GAP SERPL CALCULATED.3IONS-SCNC: 10 MEQ/L (ref 9–15)
AST SERPL-CCNC: 9 U/L (ref 0–40)
BASOPHILS ABSOLUTE: 0.1 K/UL (ref 0–0.2)
BASOPHILS RELATIVE PERCENT: 0.8 %
BILIRUB SERPL-MCNC: 0.3 MG/DL (ref 0.2–0.7)
BUN BLDV-MCNC: 12 MG/DL (ref 6–20)
C-REACTIVE PROTEIN: 38.7 MG/L (ref 0–5)
CALCIUM SERPL-MCNC: 9.4 MG/DL (ref 8.5–9.9)
CHLORIDE BLD-SCNC: 93 MEQ/L (ref 95–107)
CO2: 26 MEQ/L (ref 20–31)
CREAT SERPL-MCNC: 0.58 MG/DL (ref 0.7–1.2)
EOSINOPHILS ABSOLUTE: 0.3 K/UL (ref 0–0.7)
EOSINOPHILS RELATIVE PERCENT: 2.4 %
GFR AFRICAN AMERICAN: >60
GFR NON-AFRICAN AMERICAN: >60
GLOBULIN: 4 G/DL (ref 2.3–3.5)
GLUCOSE BLD-MCNC: 274 MG/DL (ref 70–99)
HCT VFR BLD CALC: 42.8 % (ref 42–52)
HEMOGLOBIN: 14.5 G/DL (ref 14–18)
LYMPHOCYTES ABSOLUTE: 2.2 K/UL (ref 1–4.8)
LYMPHOCYTES RELATIVE PERCENT: 16.4 %
MCH RBC QN AUTO: 29.6 PG (ref 27–31.3)
MCHC RBC AUTO-ENTMCNC: 33.8 % (ref 33–37)
MCV RBC AUTO: 87.4 FL (ref 80–100)
MONOCYTES ABSOLUTE: 1.1 K/UL (ref 0.2–0.8)
MONOCYTES RELATIVE PERCENT: 8.5 %
NEUTROPHILS ABSOLUTE: 9.6 K/UL (ref 1.4–6.5)
NEUTROPHILS RELATIVE PERCENT: 71.9 %
PDW BLD-RTO: 13.2 % (ref 11.5–14.5)
PLATELET # BLD: 237 K/UL (ref 130–400)
POTASSIUM SERPL-SCNC: 4.2 MEQ/L (ref 3.4–4.9)
RBC # BLD: 4.89 M/UL (ref 4.7–6.1)
SEDIMENTATION RATE, ERYTHROCYTE: 28 MM (ref 0–20)
SODIUM BLD-SCNC: 129 MEQ/L (ref 135–144)
TOTAL PROTEIN: 7.8 G/DL (ref 6.3–8)
URIC ACID, SERUM: 2.9 MG/DL (ref 3.4–7)
WBC # BLD: 13.3 K/UL (ref 4.8–10.8)

## 2021-02-07 PROCEDURE — 2580000003 HC RX 258: Performed by: EMERGENCY MEDICINE

## 2021-02-07 PROCEDURE — 96376 TX/PRO/DX INJ SAME DRUG ADON: CPT

## 2021-02-07 PROCEDURE — 73110 X-RAY EXAM OF WRIST: CPT

## 2021-02-07 PROCEDURE — 85652 RBC SED RATE AUTOMATED: CPT

## 2021-02-07 PROCEDURE — 85025 COMPLETE CBC W/AUTO DIFF WBC: CPT

## 2021-02-07 PROCEDURE — 86140 C-REACTIVE PROTEIN: CPT

## 2021-02-07 PROCEDURE — 99285 EMERGENCY DEPT VISIT HI MDM: CPT

## 2021-02-07 PROCEDURE — 80053 COMPREHEN METABOLIC PANEL: CPT

## 2021-02-07 PROCEDURE — 6360000002 HC RX W HCPCS: Performed by: EMERGENCY MEDICINE

## 2021-02-07 PROCEDURE — 36415 COLL VENOUS BLD VENIPUNCTURE: CPT

## 2021-02-07 PROCEDURE — 96374 THER/PROPH/DIAG INJ IV PUSH: CPT

## 2021-02-07 PROCEDURE — 96375 TX/PRO/DX INJ NEW DRUG ADDON: CPT

## 2021-02-07 PROCEDURE — 84550 ASSAY OF BLOOD/URIC ACID: CPT

## 2021-02-07 RX ORDER — OXYCODONE HYDROCHLORIDE AND ACETAMINOPHEN 5; 325 MG/1; MG/1
1 TABLET ORAL EVERY 6 HOURS PRN
Qty: 12 TABLET | Refills: 0 | Status: SHIPPED | OUTPATIENT
Start: 2021-02-07 | End: 2021-02-07

## 2021-02-07 RX ORDER — ONDANSETRON 2 MG/ML
4 INJECTION INTRAMUSCULAR; INTRAVENOUS ONCE
Status: COMPLETED | OUTPATIENT
Start: 2021-02-07 | End: 2021-02-07

## 2021-02-07 RX ORDER — METHADONE HYDROCHLORIDE 10 MG/5ML
10 SOLUTION ORAL EVERY 4 HOURS PRN
COMMUNITY

## 2021-02-07 RX ORDER — KETOROLAC TROMETHAMINE 30 MG/ML
30 INJECTION, SOLUTION INTRAMUSCULAR; INTRAVENOUS ONCE
Status: COMPLETED | OUTPATIENT
Start: 2021-02-07 | End: 2021-02-07

## 2021-02-07 RX ORDER — MELOXICAM 15 MG/1
15 TABLET ORAL DAILY PRN
Qty: 90 TABLET | Refills: 1 | Status: SHIPPED | OUTPATIENT
Start: 2021-02-07

## 2021-02-07 RX ORDER — SULFAMETHOXAZOLE AND TRIMETHOPRIM 800; 160 MG/1; MG/1
1 TABLET ORAL 2 TIMES DAILY
Qty: 20 TABLET | Refills: 0 | Status: SHIPPED | OUTPATIENT
Start: 2021-02-07 | End: 2021-02-17

## 2021-02-07 RX ORDER — 0.9 % SODIUM CHLORIDE 0.9 %
1000 INTRAVENOUS SOLUTION INTRAVENOUS ONCE
Status: COMPLETED | OUTPATIENT
Start: 2021-02-07 | End: 2021-02-07

## 2021-02-07 RX ORDER — PREDNISONE 20 MG/1
40 TABLET ORAL DAILY
Qty: 20 TABLET | Refills: 0 | Status: SHIPPED | OUTPATIENT
Start: 2021-02-07 | End: 2021-02-17

## 2021-02-07 RX ADMIN — HYDROMORPHONE HYDROCHLORIDE 1 MG: 1 INJECTION, SOLUTION INTRAMUSCULAR; INTRAVENOUS; SUBCUTANEOUS at 16:35

## 2021-02-07 RX ADMIN — ONDANSETRON 4 MG: 2 INJECTION INTRAMUSCULAR; INTRAVENOUS at 14:19

## 2021-02-07 RX ADMIN — KETOROLAC TROMETHAMINE 30 MG: 30 INJECTION, SOLUTION INTRAMUSCULAR at 14:19

## 2021-02-07 RX ADMIN — HYDROMORPHONE HYDROCHLORIDE 1 MG: 1 INJECTION, SOLUTION INTRAMUSCULAR; INTRAVENOUS; SUBCUTANEOUS at 14:19

## 2021-02-07 RX ADMIN — HYDROMORPHONE HYDROCHLORIDE 1 MG: 1 INJECTION, SOLUTION INTRAMUSCULAR; INTRAVENOUS; SUBCUTANEOUS at 15:42

## 2021-02-07 RX ADMIN — ONDANSETRON 4 MG: 2 INJECTION INTRAMUSCULAR; INTRAVENOUS at 16:36

## 2021-02-07 RX ADMIN — SODIUM CHLORIDE 1000 ML: 9 INJECTION, SOLUTION INTRAVENOUS at 14:19

## 2021-02-07 ASSESSMENT — PAIN SCALES - GENERAL
PAINLEVEL_OUTOF10: 10
PAINLEVEL_OUTOF10: 5
PAINLEVEL_OUTOF10: 10

## 2021-02-07 ASSESSMENT — ENCOUNTER SYMPTOMS
VOMITING: 0
COUGH: 0
DIARRHEA: 0
SORE THROAT: 0
NAUSEA: 0
SHORTNESS OF BREATH: 0
BACK PAIN: 0
ABDOMINAL PAIN: 0

## 2021-02-07 ASSESSMENT — PAIN DESCRIPTION - FREQUENCY: FREQUENCY: CONTINUOUS

## 2021-02-07 ASSESSMENT — PAIN DESCRIPTION - ORIENTATION: ORIENTATION: LEFT

## 2021-02-07 ASSESSMENT — PAIN DESCRIPTION - PAIN TYPE: TYPE: ACUTE PAIN

## 2021-02-07 ASSESSMENT — PAIN DESCRIPTION - DESCRIPTORS: DESCRIPTORS: SHARP

## 2021-02-07 NOTE — ED PROVIDER NOTES
[address] [facility] ED  eMERGENCYdEPARTMENT eNCOUnter      Pt Name: John Dickinson  MRN: [id number]  Latriciagfcedric 1965  Date of evaluation: 2/7/2021  Pancho Ortiz MD    CHIEF COMPLAINT           HPI  John Dickinson is a 63 y.o. male per chart review has a h/o HTN, OA presents to the ED with L wrist pain and swelling. Pt notes gradual onset, moderate, constant, aching, L wrist pain since last night.  +Swelling. Pt denies fall or any trauma. Pt denies fever, n/v, cp, sob, dysuria, diarrhea. ROS  Review of Systems   Constitutional: Negative for activity change, chills and fever. HENT: Negative for ear pain and sore throat. Eyes: Negative for visual disturbance. Respiratory: Negative for cough and shortness of breath. Cardiovascular: Negative for chest pain, palpitations and leg swelling. Gastrointestinal: Negative for abdominal pain, diarrhea, nausea and vomiting. Genitourinary: Negative for dysuria. Musculoskeletal: Negative for back pain. L wrist pain and swelling     Skin: Negative for rash. Neurological: Negative for dizziness and weakness. Except as noted above the remainder of the review of systems was reviewed and negative. PAST MEDICAL HISTORY     Past Medical History:   Diagnosis Date    Hypertension     Osteoarthritis          SURGICAL HISTORY       Past Surgical History:   Procedure Laterality Date    JOINT REPLACEMENT      KNEE SURGERY Left 06/09/2015         CURRENTMEDICATIONS       Previous Medications    BLOOD GLUCOSE TEST STRIPS (ASCENSIA AUTODISC VI;ONE TOUCH ULTRA TEST VI) STRIP    Test once daily as directed.     DOCUSATE SODIUM (COLACE) 100 MG CAPSULE    Take 1 capsule by mouth 2 times daily    GABAPENTIN (NEURONTIN) 300 MG CAPSULE    Take 1 capsule by mouth 3 times daily Start at night    LISINOPRIL (PRINIVIL;ZESTRIL) 20 MG TABLET    Take 1 tablet by mouth daily    METFORMIN (GLUCOPHAGE XR) 500 MG EXTENDED RELEASE TABLET    Take 1 tablet by mouth Daily with supper    METHADONE 10 MG/5ML SOLUTION    Take 10 mg by mouth every 4 hours as needed for Pain. NAPROXEN (NAPROSYN) 500 MG TABLET    Take 1 tablet by mouth 2 times daily (with meals)    SOFT TOUCH LANCETS MISC    1 each by Does not apply route daily       ALLERGIES     Patient has no known allergies. FAMILY HISTORY       Family History   Problem Relation Age of Onset    High Blood Pressure Mother           SOCIAL HISTORY       Social History     Socioeconomic History    Marital status:      Spouse name: None    Number of children: None    Years of education: None    Highest education level: None   Occupational History    None   Social Needs    Financial resource strain: None    Food insecurity     Worry: None     Inability: None    Transportation needs     Medical: None     Non-medical: None   Tobacco Use    Smoking status: Current Every Day Smoker     Packs/day: 2.00     Types: Cigarettes    Smokeless tobacco: Never Used   Substance and Sexual Activity    Alcohol use: No     Alcohol/week: 0.0 standard drinks     Comment: quit 2000.      Drug use: No    Sexual activity: Yes   Lifestyle    Physical activity     Days per week: None     Minutes per session: None    Stress: None   Relationships    Social connections     Talks on phone: None     Gets together: None     Attends Alevism service: None     Active member of club or organization: None     Attends meetings of clubs or organizations: None     Relationship status: None    Intimate partner violence     Fear of current or ex partner: None     Emotionally abused: None     Physically abused: None     Forced sexual activity: None   Other Topics Concern    None   Social History Narrative    None         PHYSICAL EXAM       ED Triage Vitals [02/07/21 1350]   BP Temp Temp Source Pulse Resp SpO2 Height Weight   (!) 213/107 97.7 °F (36.5 °C) Oral 77 18 97 % 5' 11\" (1.803 m) 200 lb (90.7 kg)       Physical Exam  Vitals signs and nursing note reviewed. Constitutional:       Appearance: He is well-developed. HENT:      Head: Normocephalic. Right Ear: External ear normal.      Left Ear: External ear normal.   Eyes:      Conjunctiva/sclera: Conjunctivae normal.      Pupils: Pupils are equal, round, and reactive to light. Neck:      Musculoskeletal: Normal range of motion and neck supple. Cardiovascular:      Rate and Rhythm: Normal rate and regular rhythm. Heart sounds: Normal heart sounds. Pulmonary:      Effort: Pulmonary effort is normal.      Breath sounds: Normal breath sounds. Abdominal:      General: Bowel sounds are normal. There is no distension. Palpations: Abdomen is soft. Tenderness: There is no abdominal tenderness. Musculoskeletal: Normal range of motion. Comments: +Swelling noted in L wrist.  Reduced ROM due to pain. 2+ L radial pulse. Skin:     General: Skin is warm and dry. Neurological:      Mental Status: He is alert and oriented to person, place, and time. Psychiatric:         Mood and Affect: Mood normal.           MDM  62 yo male presents to the ED with L wrist pain and swelling. Pt is afebrile, hemodynamically stable. Pt given 1 L NS, IV dilaudid, IV zofran, IV toradol in the ED with moderate relief. Labs remarkable for Na 129, ESR 28, glucose 274, WBC 13, CRP 38.7, uric acid 2.9. XR of wrist negative. Multiple attempts were made to try and do an arthrocentesis of the pt's wrist were unsuccessful. Procedure was terminated due to pain. Pt placed in a wrist volar splint. Case discussed with Dr. Vilma Morrison and we both believed that pt most likely had gout. Pt eats lots of meat. No seafood or alcohol. Pt educated about gout and wrist pain and swelling. Pt given septic arthritis warning signs and will return if the pain and swelling of his wrist become worse or if he develops a fever. Pt offered admission to see ortho hand however pt declined.   Pt still having wrist pain after procedure so pt given another dose of IV dilaudid, IV zofran. Pt given prescription for prednisone, mobic, bactrim. Pt will f/u with ortho hand next week. Pt understands plan. FINAL IMPRESSION      1. Left wrist pain    2.  Wrist swelling, left          DISPOSITION/PLAN   DISPOSITION Decision To Discharge 02/07/2021 04:37:17 PM        DISCHARGE MEDICATIONS:  [unfilled]         Derrek Srivastava MD(electronically signed)  Attending Emergency Physician            Derrek Srivastava MD  02/07/21 3556 Sascha Ye MD  02/07/21 1298

## 2021-03-21 ENCOUNTER — APPOINTMENT (OUTPATIENT)
Dept: CT IMAGING | Age: 56
End: 2021-03-21
Payer: COMMERCIAL

## 2021-03-21 ENCOUNTER — HOSPITAL ENCOUNTER (EMERGENCY)
Age: 56
Discharge: HOME | End: 2021-03-22
Attending: EMERGENCY MEDICINE
Payer: COMMERCIAL

## 2021-03-21 ENCOUNTER — APPOINTMENT (OUTPATIENT)
Dept: GENERAL RADIOLOGY | Age: 56
End: 2021-03-21
Payer: COMMERCIAL

## 2021-03-21 DIAGNOSIS — R07.9 CHEST PAIN, UNSPECIFIED TYPE: Primary | ICD-10-CM

## 2021-03-21 PROCEDURE — 96375 TX/PRO/DX INJ NEW DRUG ADDON: CPT

## 2021-03-21 PROCEDURE — 84484 ASSAY OF TROPONIN QUANT: CPT

## 2021-03-21 PROCEDURE — 96374 THER/PROPH/DIAG INJ IV PUSH: CPT

## 2021-03-21 PROCEDURE — 36415 COLL VENOUS BLD VENIPUNCTURE: CPT

## 2021-03-21 PROCEDURE — 93005 ELECTROCARDIOGRAM TRACING: CPT | Performed by: EMERGENCY MEDICINE

## 2021-03-21 PROCEDURE — 85025 COMPLETE CBC W/AUTO DIFF WBC: CPT

## 2021-03-21 PROCEDURE — 6370000000 HC RX 637 (ALT 250 FOR IP): Performed by: EMERGENCY MEDICINE

## 2021-03-21 PROCEDURE — 99285 EMERGENCY DEPT VISIT HI MDM: CPT

## 2021-03-21 PROCEDURE — 83880 ASSAY OF NATRIURETIC PEPTIDE: CPT

## 2021-03-21 PROCEDURE — 80053 COMPREHEN METABOLIC PANEL: CPT

## 2021-03-21 RX ORDER — ASPIRIN 81 MG/1
324 TABLET, CHEWABLE ORAL ONCE
Status: COMPLETED | OUTPATIENT
Start: 2021-03-21 | End: 2021-03-21

## 2021-03-21 RX ADMIN — ASPIRIN 324 MG: 81 TABLET, CHEWABLE ORAL at 23:53

## 2021-03-21 ASSESSMENT — PAIN DESCRIPTION - DESCRIPTORS: DESCRIPTORS: SHARP

## 2021-03-21 ASSESSMENT — PAIN DESCRIPTION - PAIN TYPE: TYPE: ACUTE PAIN

## 2021-03-21 ASSESSMENT — PAIN DESCRIPTION - LOCATION: LOCATION: CHEST

## 2021-03-21 ASSESSMENT — PAIN DESCRIPTION - FREQUENCY: FREQUENCY: INTERMITTENT

## 2021-03-22 ENCOUNTER — APPOINTMENT (OUTPATIENT)
Dept: CT IMAGING | Age: 56
End: 2021-03-22
Payer: COMMERCIAL

## 2021-03-22 VITALS
OXYGEN SATURATION: 94 % | SYSTOLIC BLOOD PRESSURE: 150 MMHG | DIASTOLIC BLOOD PRESSURE: 90 MMHG | RESPIRATION RATE: 18 BRPM | TEMPERATURE: 98 F | HEIGHT: 71 IN | BODY MASS INDEX: 28 KG/M2 | HEART RATE: 80 BPM | WEIGHT: 200 LBS

## 2021-03-22 LAB
ALBUMIN SERPL-MCNC: 4.1 G/DL (ref 3.5–4.6)
ALP BLD-CCNC: 137 U/L (ref 35–104)
ALT SERPL-CCNC: 15 U/L (ref 0–41)
ANION GAP SERPL CALCULATED.3IONS-SCNC: 11 MEQ/L (ref 9–15)
AST SERPL-CCNC: 12 U/L (ref 0–40)
BASOPHILS ABSOLUTE: 0.1 K/UL (ref 0–0.2)
BASOPHILS RELATIVE PERCENT: 1.1 %
BILIRUB SERPL-MCNC: 0.4 MG/DL (ref 0.2–0.7)
BUN BLDV-MCNC: 16 MG/DL (ref 6–20)
CALCIUM SERPL-MCNC: 9.7 MG/DL (ref 8.5–9.9)
CHLORIDE BLD-SCNC: 95 MEQ/L (ref 95–107)
CO2: 27 MEQ/L (ref 20–31)
CREAT SERPL-MCNC: 0.96 MG/DL (ref 0.7–1.2)
EKG ATRIAL RATE: 74 BPM
EKG ATRIAL RATE: 78 BPM
EKG P AXIS: 73 DEGREES
EKG P AXIS: 78 DEGREES
EKG P-R INTERVAL: 156 MS
EKG P-R INTERVAL: 160 MS
EKG Q-T INTERVAL: 350 MS
EKG Q-T INTERVAL: 382 MS
EKG QRS DURATION: 76 MS
EKG QRS DURATION: 76 MS
EKG QTC CALCULATION (BAZETT): 399 MS
EKG QTC CALCULATION (BAZETT): 424 MS
EKG R AXIS: 54 DEGREES
EKG R AXIS: 59 DEGREES
EKG T AXIS: 32 DEGREES
EKG T AXIS: 39 DEGREES
EKG VENTRICULAR RATE: 74 BPM
EKG VENTRICULAR RATE: 78 BPM
EOSINOPHILS ABSOLUTE: 0.2 K/UL (ref 0–0.7)
EOSINOPHILS RELATIVE PERCENT: 1.6 %
GFR AFRICAN AMERICAN: >60
GFR AFRICAN AMERICAN: >60
GFR NON-AFRICAN AMERICAN: >60
GFR NON-AFRICAN AMERICAN: >60
GLOBULIN: 4.1 G/DL (ref 2.3–3.5)
GLUCOSE BLD-MCNC: 176 MG/DL (ref 70–99)
HCT VFR BLD CALC: 46.9 % (ref 42–52)
HEMOGLOBIN: 15.7 G/DL (ref 14–18)
LYMPHOCYTES ABSOLUTE: 2.3 K/UL (ref 1–4.8)
LYMPHOCYTES RELATIVE PERCENT: 16.6 %
MCH RBC QN AUTO: 29.9 PG (ref 27–31.3)
MCHC RBC AUTO-ENTMCNC: 33.5 % (ref 33–37)
MCV RBC AUTO: 89.3 FL (ref 80–100)
MONOCYTES ABSOLUTE: 1.1 K/UL (ref 0.2–0.8)
MONOCYTES RELATIVE PERCENT: 7.7 %
NEUTROPHILS ABSOLUTE: 10.3 K/UL (ref 1.4–6.5)
NEUTROPHILS RELATIVE PERCENT: 73 %
PDW BLD-RTO: 14.3 % (ref 11.5–14.5)
PERFORMED ON: NORMAL
PLATELET # BLD: 283 K/UL (ref 130–400)
POC CREATININE: 0.9 MG/DL (ref 0.9–1.3)
POC SAMPLE TYPE: NORMAL
POTASSIUM SERPL-SCNC: 4.1 MEQ/L (ref 3.4–4.9)
PRO-BNP: 89 PG/ML
RBC # BLD: 5.26 M/UL (ref 4.7–6.1)
SODIUM BLD-SCNC: 133 MEQ/L (ref 135–144)
TOTAL PROTEIN: 8.2 G/DL (ref 6.3–8)
TROPONIN: <0.01 NG/ML (ref 0–0.01)
TROPONIN: <0.01 NG/ML (ref 0–0.01)
WBC # BLD: 14.1 K/UL (ref 4.8–10.8)

## 2021-03-22 PROCEDURE — 96374 THER/PROPH/DIAG INJ IV PUSH: CPT

## 2021-03-22 PROCEDURE — 93005 ELECTROCARDIOGRAM TRACING: CPT | Performed by: EMERGENCY MEDICINE

## 2021-03-22 PROCEDURE — 6360000004 HC RX CONTRAST MEDICATION: Performed by: EMERGENCY MEDICINE

## 2021-03-22 PROCEDURE — 71275 CT ANGIOGRAPHY CHEST: CPT

## 2021-03-22 PROCEDURE — 96375 TX/PRO/DX INJ NEW DRUG ADDON: CPT

## 2021-03-22 PROCEDURE — 36415 COLL VENOUS BLD VENIPUNCTURE: CPT

## 2021-03-22 PROCEDURE — 6360000002 HC RX W HCPCS: Performed by: EMERGENCY MEDICINE

## 2021-03-22 PROCEDURE — 84484 ASSAY OF TROPONIN QUANT: CPT

## 2021-03-22 PROCEDURE — 6370000000 HC RX 637 (ALT 250 FOR IP): Performed by: EMERGENCY MEDICINE

## 2021-03-22 PROCEDURE — 93010 ELECTROCARDIOGRAM REPORT: CPT | Performed by: INTERNAL MEDICINE

## 2021-03-22 RX ORDER — MORPHINE SULFATE 2 MG/ML
4 INJECTION, SOLUTION INTRAMUSCULAR; INTRAVENOUS ONCE
Status: COMPLETED | OUTPATIENT
Start: 2021-03-22 | End: 2021-03-22

## 2021-03-22 RX ORDER — NITROGLYCERIN 0.4 MG/1
0.4 TABLET SUBLINGUAL EVERY 5 MIN PRN
Status: COMPLETED | OUTPATIENT
Start: 2021-03-22 | End: 2021-03-22

## 2021-03-22 RX ORDER — ONDANSETRON 2 MG/ML
4 INJECTION INTRAMUSCULAR; INTRAVENOUS ONCE
Status: COMPLETED | OUTPATIENT
Start: 2021-03-22 | End: 2021-03-22

## 2021-03-22 RX ADMIN — MORPHINE SULFATE 4 MG: 2 INJECTION, SOLUTION INTRAMUSCULAR; INTRAVENOUS at 01:53

## 2021-03-22 RX ADMIN — NITROGLYCERIN 0.4 MG: 0.4 TABLET, ORALLY DISINTEGRATING SUBLINGUAL at 01:35

## 2021-03-22 RX ADMIN — NITROGLYCERIN 0.4 MG: 0.4 TABLET, ORALLY DISINTEGRATING SUBLINGUAL at 01:25

## 2021-03-22 RX ADMIN — NITROGLYCERIN 0.4 MG: 0.4 TABLET, ORALLY DISINTEGRATING SUBLINGUAL at 01:30

## 2021-03-22 RX ADMIN — ONDANSETRON 4 MG: 2 INJECTION INTRAMUSCULAR; INTRAVENOUS at 01:53

## 2021-03-22 RX ADMIN — IOPAMIDOL 200 ML: 612 INJECTION, SOLUTION INTRAVENOUS at 00:31

## 2021-03-22 ASSESSMENT — PAIN DESCRIPTION - FREQUENCY: FREQUENCY: CONTINUOUS

## 2021-03-22 ASSESSMENT — PAIN SCALES - GENERAL
PAINLEVEL_OUTOF10: 7
PAINLEVEL_OUTOF10: 7
PAINLEVEL_OUTOF10: 8

## 2021-03-22 ASSESSMENT — PAIN DESCRIPTION - DESCRIPTORS: DESCRIPTORS: DULL

## 2021-03-22 ASSESSMENT — PAIN DESCRIPTION - PAIN TYPE: TYPE: ACUTE PAIN

## 2021-03-22 NOTE — ED TRIAGE NOTES
Arrived to the ER for c/o chest pain that started yesterday early afternoon. States pain increased in intensity today. States pain is sharp in nature and increases with movement. Reports pain is worse with movement of right arm and when cough/ deep breaths. Attempted to take a hot shower at home and use ice pack without relief. Denies N/V, diaphoresis. Reports being sick on Friday with N/V and unable to tolerate po intake, no diarrhea fever/ chills. Skin pink warm/ dry.

## 2021-03-22 NOTE — ED NOTES
Bed: 09  Expected date: 3/21/21  Expected time: 11:21 PM  Means of arrival: [facility]  Comments:  49 M  Chest pain  VSS  Line and labs     Anju Gong RN  03/21/21 8183

## 2021-03-22 NOTE — ED PROVIDER NOTES
[address] [facility] ED  EMERGENCY DEPARTMENT ENCOUNTER      Pt Name: Connie Estrada  MRN: [id number]  [place] 1965  Date of evaluation: 3/21/2021  Provider: David Carolina MD    CHIEF COMPLAINT       Chief Complaint   Patient presents with    Chest Pain         HISTORY OF PRESENT ILLNESS   (Location/Symptom, Timing/Onset, Context/Setting, Quality, Duration, Modifying Factors, Severity)  Note limiting factors. 62-year-old male presenting with chest pain. Chest pain is left-sided in location. Started about 3 days ago after a sudden movement. He notes some shortness of breath that he attributes to the pain. No cough or fever. No leg swelling or history of PE. Pain is worse with certain movements and better at rest.  Has not taken anything for relief. No cardiac history noted. It is nonexertional.          Nursing Notes were reviewed. REVIEW OF SYSTEMS    (2-9 systems for level 4, 10 or more for level 5)     Review of Systems   Cardiovascular: Positive for chest pain. All other systems reviewed and are negative. Except as noted above the remainder of the review of systems was reviewed and negative. PAST MEDICAL HISTORY     Past Medical History:   Diagnosis Date    Hypertension     Osteoarthritis          SURGICAL HISTORY       Past Surgical History:   Procedure Laterality Date    JOINT REPLACEMENT      KNEE SURGERY Left 06/09/2015         CURRENT MEDICATIONS       Current Discharge Medication List      CONTINUE these medications which have NOT CHANGED    Details   methadone 10 MG/5ML solution Take 10 mg by mouth every 4 hours as needed for Pain.       lisinopril (PRINIVIL;ZESTRIL) 20 MG tablet Take 1 tablet by mouth daily  Qty: 30 tablet, Refills: 5    Associated Diagnoses: Essential hypertension      meloxicam (MOBIC) 15 MG tablet Take 1 tablet by mouth daily as needed for Pain  Qty: 90 tablet, Refills: 1      blood glucose test strips (ASCENSIA AUTODISC VI;ONE TOUCH ULTRA TEST VI) strip Test once daily as directed. Qty: 50 strip, Refills: 3      SOFT TOUCH LANCETS MISC 1 each by Does not apply route daily  Qty: 100 each, Refills: 3      metFORMIN (GLUCOPHAGE XR) 500 MG extended release tablet Take 1 tablet by mouth Daily with supper  Qty: 30 tablet, Refills: 5      docusate sodium (COLACE) 100 MG capsule Take 1 capsule by mouth 2 times daily  Qty: 30 capsule, Refills: 0      naproxen (NAPROSYN) 500 MG tablet Take 1 tablet by mouth 2 times daily (with meals)  Qty: 10 tablet, Refills: 0      gabapentin (NEURONTIN) 300 MG capsule Take 1 capsule by mouth 3 times daily Start at night  Qty: 90 capsule, Refills: 0             ALLERGIES     Patient has no known allergies. FAMILY HISTORY       Family History   Problem Relation Age of Onset    High Blood Pressure Mother           SOCIAL HISTORY       Social History     Socioeconomic History    Marital status:      Spouse name: None    Number of children: None    Years of education: None    Highest education level: None   Occupational History    None   Social Needs    Financial resource strain: None    Food insecurity     Worry: None     Inability: None    Transportation needs     Medical: None     Non-medical: None   Tobacco Use    Smoking status: Current Every Day Smoker     Packs/day: 2.00     Types: Cigarettes    Smokeless tobacco: Never Used   Substance and Sexual Activity    Alcohol use: No     Alcohol/week: 0.0 standard drinks     Comment: quit 2000.      Drug use: No    Sexual activity: Yes   Lifestyle    Physical activity     Days per week: None     Minutes per session: None    Stress: None   Relationships    Social connections     Talks on phone: None     Gets together: None     Attends Caodaism service: None     Active member of club or organization: None     Attends meetings of clubs or organizations: None     Relationship status: None    Intimate partner violence     Fear of current or ex partner: None Emotionally abused: None     Physically abused: None     Forced sexual activity: None   Other Topics Concern    None   Social History Narrative    None       SCREENINGS      Heart Score for chest pain patients  History: Slightly Suspicious  ECG: Normal  Patient Age: > 39 and < 65 years  *Risk factors for Atherosclerotic disease: Cigarette smoking, Hypertension  Risk Factors: 1 or 2 risk factors  Troponin: < 1X normal limit  Heart Score Total: 2        PHYSICAL EXAM    (up to 7 for level 4, 8 or more for level 5)     ED Triage Vitals   BP Temp Temp src Pulse Resp SpO2 Height Weight   -- -- -- -- -- -- -- --       Physical Exam  Vitals signs and nursing note reviewed. Constitutional:       General: He is not in acute distress. Appearance: Normal appearance. He is well-developed. He is not ill-appearing. HENT:      Head: Normocephalic and atraumatic. Mouth/Throat:      Mouth: Mucous membranes are moist.      Pharynx: Oropharynx is clear. Eyes:      Extraocular Movements: Extraocular movements intact. Conjunctiva/sclera: Conjunctivae normal.   Neck:      Musculoskeletal: Normal range of motion and neck supple. Cardiovascular:      Rate and Rhythm: Normal rate and regular rhythm. Pulmonary:      Effort: Pulmonary effort is normal.      Breath sounds: Normal breath sounds. Abdominal:      General: Bowel sounds are normal.      Palpations: Abdomen is soft. Musculoskeletal: Normal range of motion. General: No deformity. Skin:     General: Skin is warm and dry. Capillary Refill: Capillary refill takes less than 2 seconds. Neurological:      General: No focal deficit present. Mental Status: He is alert and oriented to person, place, and time. Mental status is at baseline. Cranial Nerves: No cranial nerve deficit. Psychiatric:         Thought Content:  Thought content normal.         DIAGNOSTIC RESULTS     EKG: All EKG's are interpreted by the Emergency Department Physician who either signs or Co-signs this chart in the absence of a cardiologist.    NSR, rate 78, normal intervals, no ST elevation/ depression    REPEAT:  NSR, rate 74, normal intervals, no ST elevation/ depression, no change from previous     RADIOLOGY:   Non-plain film images such as CT, Ultrasound and MRI are read by the radiologist. Plain radiographic images are visualized and preliminarily interpreted by the emergency physician with the below findings:    Interpretation per the Radiologist below, if available at the time of this note:    CTA Chest W WO  (PE study)    (Results Pending)       LABS:  Labs Reviewed   COMPREHENSIVE METABOLIC PANEL - Abnormal; Notable for the following components:       Result Value    Sodium 133 (*)     Glucose 176 (*)     Total Protein 8.2 (*)     Alkaline Phosphatase 137 (*)     Globulin 4.1 (*)     All other components within normal limits   CBC WITH AUTO DIFFERENTIAL - Abnormal; Notable for the following components:    WBC 14.1 (*)     Neutrophils Absolute 10.3 (*)     Monocytes Absolute 1.1 (*)     All other components within normal limits   TROPONIN   BRAIN NATRIURETIC PEPTIDE   TROPONIN   BRAIN NATRIURETIC PEPTIDE       All other labs were within normal range or not returned as of this dictation. EMERGENCY DEPARTMENT COURSE and DIFFERENTIAL DIAGNOSIS/MDM:   Vitals:    Vitals:    03/22/21 0130 03/22/21 0136 03/22/21 0200 03/22/21 0205   BP: (!) 144/95 (!) 172/90 (!) 157/78 (!) 150/90   Pulse: 87 92 74 80   Resp: 16 18 18 18   Temp:       TempSrc:       SpO2: 91% 92% 93% 94%   Weight:       Height:           MDM  Number of Diagnoses or Management Options  Chest pain, unspecified type  Diagnosis management comments: Presents with chest pain. HEART score 2. With low risk and negative troponin I feel patient is appropriate for outpatient workup of pain. Onset of symptoms noted to be about 3 days ago, patient rules out for MI at this point.   Do not suspect PE based on neg CT.  CXR not suggestive of PNX and history and physical, along with vital signs, do not suggest dissection, tamponade or any life threatening process. Patient will be discharged home. Follow up with doctor in 2-3 days. Patient informed that they should return immediately to ED for new or worsening chest pain or SOB, or any other concerning symptom. Patient well appearing and hemodynamically stable on discharge. Leaves department in good condition. Agreeable with plan of care. Procedures    CRITICAL CARE TIME   Total Critical Care time was 0 minutes, excluding separately reportable procedures. There was a high probability of clinically significant/life threatening deterioration in the patient's condition which required my urgent intervention. FINAL IMPRESSION      1.  Chest pain, unspecified type          DISPOSITION/PLAN   DISPOSITION Decision To Discharge 03/22/2021 02:43:00 AM      (Please note that portions of this note were completed with a voice recognition program.  Efforts were made to edit the dictations but occasionally words are mis-transcribed.)    Varun Acosta MD (electronically signed)  Attending Emergency Physician        Varun Acosta MD  03/22/21 0058

## 2021-03-22 NOTE — ED NOTES
O2 2L applied per pt request and comfort related to SOB and anxiety from pain      Justine George, BRITTANIE  03/21/21 7314

## 2021-07-12 ENCOUNTER — APPOINTMENT (OUTPATIENT)
Dept: GENERAL RADIOLOGY | Age: 56
End: 2021-07-12
Payer: COMMERCIAL

## 2021-07-12 ENCOUNTER — HOSPITAL ENCOUNTER (EMERGENCY)
Age: 56
Discharge: HOME | End: 2021-07-12
Attending: EMERGENCY MEDICINE
Payer: COMMERCIAL

## 2021-07-12 VITALS
HEART RATE: 79 BPM | BODY MASS INDEX: 28 KG/M2 | WEIGHT: 200 LBS | OXYGEN SATURATION: 96 % | DIASTOLIC BLOOD PRESSURE: 82 MMHG | SYSTOLIC BLOOD PRESSURE: 168 MMHG | HEIGHT: 71 IN | TEMPERATURE: 98.3 F | RESPIRATION RATE: 20 BRPM

## 2021-07-12 DIAGNOSIS — M25.532 LEFT WRIST PAIN: Primary | ICD-10-CM

## 2021-07-12 LAB
ANION GAP SERPL CALCULATED.3IONS-SCNC: 11 MEQ/L (ref 9–15)
BASOPHILS ABSOLUTE: 0.1 K/UL (ref 0–0.1)
BASOPHILS RELATIVE PERCENT: 0.7 % (ref 0.2–1.2)
BUN BLDV-MCNC: 10 MG/DL (ref 6–20)
CALCIUM SERPL-MCNC: 9.4 MG/DL (ref 8.5–9.9)
CHLORIDE BLD-SCNC: 94 MEQ/L (ref 95–107)
CO2: 26 MEQ/L (ref 20–31)
CREAT SERPL-MCNC: 0.65 MG/DL (ref 0.7–1.2)
EOSINOPHILS ABSOLUTE: 0.4 K/UL (ref 0–0.5)
EOSINOPHILS RELATIVE PERCENT: 2.8 % (ref 0.8–7)
GFR AFRICAN AMERICAN: >60
GFR NON-AFRICAN AMERICAN: >60
GLUCOSE BLD-MCNC: 189 MG/DL (ref 70–99)
HCT VFR BLD CALC: 43.4 % (ref 42–52)
HEMOGLOBIN: 14.9 G/DL (ref 13.7–17.5)
IMMATURE GRANULOCYTES #: 0 K/UL
IMMATURE GRANULOCYTES %: 0.3 %
LYMPHOCYTES ABSOLUTE: 2.6 K/UL (ref 1.3–3.6)
LYMPHOCYTES RELATIVE PERCENT: 18.2 %
MCH RBC QN AUTO: 29.6 PG (ref 25.7–32.2)
MCHC RBC AUTO-ENTMCNC: 34.3 % (ref 32.3–36.5)
MCV RBC AUTO: 86.1 FL (ref 79–92.2)
MONOCYTES ABSOLUTE: 1.3 K/UL (ref 0.3–0.8)
MONOCYTES RELATIVE PERCENT: 8.7 % (ref 5.3–12.2)
NEUTROPHILS ABSOLUTE: 10 K/UL (ref 1.8–5.4)
NEUTROPHILS RELATIVE PERCENT: 69.3 % (ref 34–67.9)
PDW BLD-RTO: 12.9 % (ref 11.6–14.4)
PLATELET # BLD: 226 K/UL (ref 163–337)
POTASSIUM SERPL-SCNC: 4.2 MEQ/L (ref 3.4–4.9)
RBC # BLD: 5.04 M/UL (ref 4.63–6.08)
SEDIMENTATION RATE, ERYTHROCYTE: 25 MM (ref 0–20)
SODIUM BLD-SCNC: 131 MEQ/L (ref 135–144)
WBC # BLD: 14.4 K/UL (ref 4.2–9)

## 2021-07-12 PROCEDURE — 85652 RBC SED RATE AUTOMATED: CPT

## 2021-07-12 PROCEDURE — 85025 COMPLETE CBC W/AUTO DIFF WBC: CPT

## 2021-07-12 PROCEDURE — 6360000002 HC RX W HCPCS: Performed by: EMERGENCY MEDICINE

## 2021-07-12 PROCEDURE — 2580000003 HC RX 258: Performed by: EMERGENCY MEDICINE

## 2021-07-12 PROCEDURE — 99282 EMERGENCY DEPT VISIT SF MDM: CPT

## 2021-07-12 PROCEDURE — 96375 TX/PRO/DX INJ NEW DRUG ADDON: CPT

## 2021-07-12 PROCEDURE — 73110 X-RAY EXAM OF WRIST: CPT

## 2021-07-12 PROCEDURE — 80048 BASIC METABOLIC PNL TOTAL CA: CPT

## 2021-07-12 PROCEDURE — 36415 COLL VENOUS BLD VENIPUNCTURE: CPT

## 2021-07-12 PROCEDURE — 96374 THER/PROPH/DIAG INJ IV PUSH: CPT

## 2021-07-12 RX ORDER — ORPHENADRINE CITRATE 30 MG/ML
60 INJECTION INTRAMUSCULAR; INTRAVENOUS ONCE
Status: COMPLETED | OUTPATIENT
Start: 2021-07-12 | End: 2021-07-12

## 2021-07-12 RX ORDER — NAPROXEN 500 MG/1
500 TABLET ORAL 2 TIMES DAILY
Qty: 20 TABLET | Refills: 0 | Status: SHIPPED | OUTPATIENT
Start: 2021-07-12 | End: 2021-07-22

## 2021-07-12 RX ORDER — 0.9 % SODIUM CHLORIDE 0.9 %
1000 INTRAVENOUS SOLUTION INTRAVENOUS ONCE
Status: COMPLETED | OUTPATIENT
Start: 2021-07-12 | End: 2021-07-12

## 2021-07-12 RX ORDER — PREDNISONE 20 MG/1
20 TABLET ORAL DAILY
Qty: 5 TABLET | Refills: 0 | Status: SHIPPED | OUTPATIENT
Start: 2021-07-12 | End: 2021-07-17

## 2021-07-12 RX ORDER — KETOROLAC TROMETHAMINE 30 MG/ML
30 INJECTION, SOLUTION INTRAMUSCULAR; INTRAVENOUS ONCE
Status: COMPLETED | OUTPATIENT
Start: 2021-07-12 | End: 2021-07-12

## 2021-07-12 RX ADMIN — ORPHENADRINE CITRATE 60 MG: 60 INJECTION INTRAMUSCULAR; INTRAVENOUS at 06:05

## 2021-07-12 RX ADMIN — SODIUM CHLORIDE 1000 ML: 9 INJECTION, SOLUTION INTRAVENOUS at 06:04

## 2021-07-12 RX ADMIN — KETOROLAC TROMETHAMINE 30 MG: 30 INJECTION, SOLUTION INTRAMUSCULAR at 06:05

## 2021-07-12 ASSESSMENT — PAIN DESCRIPTION - LOCATION: LOCATION: WRIST

## 2021-07-12 ASSESSMENT — PAIN DESCRIPTION - FREQUENCY: FREQUENCY: CONTINUOUS

## 2021-07-12 ASSESSMENT — ENCOUNTER SYMPTOMS
SHORTNESS OF BREATH: 0
BACK PAIN: 0
EYE DISCHARGE: 0
SORE THROAT: 0
NAUSEA: 0
ABDOMINAL PAIN: 0
VOMITING: 0
EYE REDNESS: 0
COUGH: 0

## 2021-07-12 ASSESSMENT — PAIN DESCRIPTION - ONSET: ONSET: AWAKENED FROM SLEEP

## 2021-07-12 ASSESSMENT — PAIN DESCRIPTION - DESCRIPTORS: DESCRIPTORS: THROBBING;PRESSURE

## 2021-07-12 ASSESSMENT — PAIN SCALES - GENERAL
PAINLEVEL_OUTOF10: 9
PAINLEVEL_OUTOF10: 9

## 2021-07-12 ASSESSMENT — PAIN DESCRIPTION - ORIENTATION: ORIENTATION: LEFT

## 2021-07-12 ASSESSMENT — PAIN SCALES - WONG BAKER: WONGBAKER_NUMERICALRESPONSE: 8

## 2021-07-12 ASSESSMENT — PAIN DESCRIPTION - PAIN TYPE: TYPE: ACUTE PAIN

## 2021-07-12 NOTE — ED PROVIDER NOTES
[facility] ED  EMERGENCY DEPARTMENT ENCOUNTER      Pt Name: Brigid James  MRN: [id number]  [place] 1965  Date of evaluation: 7/12/2021  Provider: Katarina Knowles DO        HISTORY OF PRESENT ILLNESS    Brigid James is a 63 y.o. male per chart review has ah/o HTN, OA. Complains of left wrist pain. He states he has had this before. Pain worse today. The history is provided by the patient. Wrist Problem  Location:  Wrist  Wrist location:  L wrist  Injury: no    Pain details:     Quality:  Aching    Radiates to:  Does not radiate    Severity:  Moderate    Onset quality:  Sudden    Timing:  Constant    Progression:  Worsening  Handedness:  Right-handed  Dislocation: no    Foreign body present:  No foreign bodies  Tetanus status:  Up to date  Prior injury to area:  Unable to specify  Relieved by: Ice  Worsened by: Movement and exercise  Ineffective treatments:  Ice and NSAIDs  Associated symptoms: decreased range of motion and swelling    Associated symptoms: no back pain, no fever and no neck pain    Risk factors: no concern for non-accidental trauma, no known bone disorder, no frequent fractures and no recent illness             REVIEW OF SYSTEMS       Review of Systems   Constitutional: Negative for chills and fever. HENT: Negative for ear pain and sore throat. Eyes: Negative for discharge and redness. Respiratory: Negative for cough and shortness of breath. Cardiovascular: Negative for chest pain and palpitations. Gastrointestinal: Negative for abdominal pain, nausea and vomiting. Genitourinary: Negative for difficulty urinating and dysuria. Musculoskeletal: Positive for joint swelling and myalgias. Negative for back pain and neck pain. Skin: Negative for rash and wound. Neurological: Negative for dizziness and syncope. Psychiatric/Behavioral: Negative for confusion. The patient is not nervous/anxious. All other systems reviewed and are negative.       Except as Vaping Use    Vaping Use: Never used   Substance and Sexual Activity    Alcohol use: No     Alcohol/week: 0.0 standard drinks     Comment: quit 2000.  Drug use: No    Sexual activity: Yes   Other Topics Concern    Not on file   Social History Narrative    Not on file     Social Determinants of Health     Financial Resource Strain:     Difficulty of Paying Living Expenses:    Food Insecurity:     Worried About Running Out of Food in the Last Year:     [address] in the Last Year:    Transportation Needs:     Lack of Transportation (Medical):  Lack of Transportation (Non-Medical):    Physical Activity:     Days of Exercise per Week:     Minutes of Exercise per Session:    Stress:     Feeling of Stress :    Social Connections:     Frequency of Communication with Friends and Family:     Frequency of Social Gatherings with Friends and Family:     Attends Jew Services:     Active Member of Clubs or Organizations:     Attends Club or Organization Meetings:     Marital Status:    Intimate Partner Violence:     Fear of Current or Ex-Partner:     Emotionally Abused:     Physically Abused:     Sexually Abused:          PHYSICAL EXAM       ED Triage Vitals [07/12/21 0453]   BP Temp Temp Source Pulse Resp SpO2 Height Weight   (!) 168/82 98.3 °F (36.8 °C) Oral 79 20 96 % 5' 11\" (1.803 m) 200 lb (90.7 kg)       Physical Exam  Musculoskeletal:      Left wrist: Tenderness present.         Arms:            LABS:  Labs Reviewed   BASIC METABOLIC PANEL - Abnormal; Notable for the following components:       Result Value    Sodium 131 (*)     Chloride 94 (*)     Glucose 189 (*)     CREATININE 0.65 (*)     All other components within normal limits   CBC WITH AUTO DIFFERENTIAL - Abnormal; Notable for the following components:    WBC 14.4 (*)     Neutrophils % 69.3 (*)     Neutrophils Absolute 10.0 (*)     Monocytes Absolute 1.3 (*)     All other components within normal limits   SEDIMENTATION RATE - Abnormal; Notable for the following components:    Sed Rate 25 (*)     All other components within normal limits         MDM:   Vitals:    Vitals:    07/12/21 0453   BP: (!) 168/82   Pulse: 79   Resp: 20   Temp: 98.3 °F (36.8 °C)   TempSrc: Oral   SpO2: 96%   Weight: 200 lb (90.7 kg)   Height: 5' 11\" (1.803 m)       MDM  Number of Diagnoses or Management Options  Left wrist pain  Diagnosis management comments: Patient presents with left wrist pain. He states he has had this before. Left wrist xray: negative for acute changes. The patient will be discharged home with follow up in 2 days with his primary care doctor. He will ice the wrist and take NSAIDs as needed for pain. Momo Shah DO     The lab results, radiology and test results were reviewed with the patient and family. The patient will follow up in 2 days with their primary care doctor. If their symptoms change or get worse they will return to the ER. CRITICAL CARE TIME   Total CriticalCare time was 0 minutes, excluding separately reportable procedures. There was a high probability of clinically significant/life threatening deterioration in the patient's condition which required my urgent intervention. PROCEDURES:  Unlessotherwise noted below, none     Procedures      FINAL IMPRESSION      1.  Left wrist pain          DISPOSITION/PLAN   DISPOSITION Decision To Discharge 07/12/2021 07:37:59 AM          FEDERICO Romo DO (electronically signed)  Attending Emergency Physician         Winter Mayberry DO  07/14/21 2037

## 2023-11-27 ENCOUNTER — APPOINTMENT (OUTPATIENT)
Dept: CT IMAGING | Age: 58
DRG: 420 | End: 2023-11-27
Payer: COMMERCIAL

## 2023-11-27 ENCOUNTER — APPOINTMENT (OUTPATIENT)
Dept: GENERAL RADIOLOGY | Age: 58
DRG: 420 | End: 2023-11-27
Payer: COMMERCIAL

## 2023-11-27 ENCOUNTER — HOSPITAL ENCOUNTER (INPATIENT)
Age: 58
LOS: 6 days | Discharge: HOME | DRG: 420 | End: 2023-12-03
Attending: INTERNAL MEDICINE | Admitting: INTERNAL MEDICINE
Payer: COMMERCIAL

## 2023-11-27 DIAGNOSIS — L03.114 CELLULITIS OF LEFT HAND: ICD-10-CM

## 2023-11-27 DIAGNOSIS — I10 ESSENTIAL HYPERTENSION: ICD-10-CM

## 2023-11-27 DIAGNOSIS — L02.512 ABSCESS OF LEFT HAND: ICD-10-CM

## 2023-11-27 DIAGNOSIS — L03.116 CELLULITIS OF LEFT FOOT: Primary | ICD-10-CM

## 2023-11-27 DIAGNOSIS — E11.65 TYPE 2 DIABETES MELLITUS WITH HYPERGLYCEMIA (HCC): ICD-10-CM

## 2023-11-27 PROBLEM — M86.9 OSTEOMYELITIS (HCC): Status: ACTIVE | Noted: 2023-11-27

## 2023-11-27 LAB
ALBUMIN SERPL-MCNC: 4.1 G/DL (ref 3.5–4.6)
ALP SERPL-CCNC: 134 U/L (ref 35–104)
ALT SERPL-CCNC: 13 U/L (ref 0–41)
ANION GAP SERPL CALCULATED.3IONS-SCNC: 12 MEQ/L (ref 9–15)
AST SERPL-CCNC: 10 U/L (ref 0–40)
BASOPHILS # BLD: 0.1 K/UL (ref 0–0.1)
BASOPHILS NFR BLD: 0.7 % (ref 0.2–1.2)
BILIRUB SERPL-MCNC: 0.3 MG/DL (ref 0.2–0.7)
BUN SERPL-MCNC: 14 MG/DL (ref 6–20)
CALCIUM SERPL-MCNC: 10.1 MG/DL (ref 8.5–9.9)
CHLORIDE SERPL-SCNC: 93 MEQ/L (ref 95–107)
CO2 SERPL-SCNC: 28 MEQ/L (ref 20–31)
CREAT SERPL-MCNC: 0.72 MG/DL (ref 0.7–1.2)
CRP SERPL HS-MCNC: 49.5 MG/L (ref 0–5)
EOSINOPHIL # BLD: 0.4 K/UL (ref 0–0.5)
EOSINOPHIL NFR BLD: 2.6 % (ref 0.8–7)
ERYTHROCYTE [DISTWIDTH] IN BLOOD BY AUTOMATED COUNT: 11.9 % (ref 11.6–14.4)
ERYTHROCYTE [SEDIMENTATION RATE] IN BLOOD BY WESTERGREN METHOD: 4 MM (ref 0–20)
GLOBULIN SER CALC-MCNC: 4.8 G/DL (ref 2.3–3.5)
GLUCOSE BLD-MCNC: 186 MG/DL (ref 70–99)
GLUCOSE SERPL-MCNC: 256 MG/DL (ref 70–99)
HCT VFR BLD AUTO: 46.3 % (ref 42–52)
HGB BLD-MCNC: 15.4 G/DL (ref 13.7–17.5)
IMM GRANULOCYTES # BLD: 0.1 K/UL
IMM GRANULOCYTES NFR BLD: 0.3 %
LACTATE BLDV-SCNC: 1.6 MMOL/L (ref 0.5–2.2)
LYMPHOCYTES # BLD: 3.4 K/UL (ref 1.3–3.6)
LYMPHOCYTES NFR BLD: 22.3 %
MCH RBC QN AUTO: 30.1 PG (ref 25.7–32.2)
MCHC RBC AUTO-ENTMCNC: 33.3 % (ref 32.3–36.5)
MCV RBC AUTO: 90.6 FL (ref 79–92.2)
MONOCYTES # BLD: 1 K/UL (ref 0.3–0.8)
MONOCYTES NFR BLD: 6.3 % (ref 5.3–12.2)
NEUTROPHILS # BLD: 10.3 K/UL (ref 1.8–5.4)
NEUTS SEG NFR BLD: 67.8 % (ref 34–67.9)
PERFORMED ON: ABNORMAL
PLATELET # BLD AUTO: 271 K/UL (ref 163–337)
POTASSIUM SERPL-SCNC: 4.5 MEQ/L (ref 3.4–4.9)
PROT SERPL-MCNC: 8.9 G/DL (ref 6.3–8)
RBC # BLD AUTO: 5.11 M/UL (ref 4.63–6.08)
SODIUM SERPL-SCNC: 133 MEQ/L (ref 135–144)
WBC # BLD AUTO: 15.3 K/UL (ref 4.2–9)

## 2023-11-27 PROCEDURE — 36415 COLL VENOUS BLD VENIPUNCTURE: CPT

## 2023-11-27 PROCEDURE — 96375 TX/PRO/DX INJ NEW DRUG ADDON: CPT

## 2023-11-27 PROCEDURE — 87184 SC STD DISK METHOD PER PLATE: CPT

## 2023-11-27 PROCEDURE — 6370000000 HC RX 637 (ALT 250 FOR IP): Performed by: INTERNAL MEDICINE

## 2023-11-27 PROCEDURE — 87077 CULTURE AEROBIC IDENTIFY: CPT

## 2023-11-27 PROCEDURE — 6360000004 HC RX CONTRAST MEDICATION

## 2023-11-27 PROCEDURE — 73130 X-RAY EXAM OF HAND: CPT

## 2023-11-27 PROCEDURE — 83605 ASSAY OF LACTIC ACID: CPT

## 2023-11-27 PROCEDURE — 87075 CULTR BACTERIA EXCEPT BLOOD: CPT

## 2023-11-27 PROCEDURE — 80053 COMPREHEN METABOLIC PANEL: CPT

## 2023-11-27 PROCEDURE — 85025 COMPLETE CBC W/AUTO DIFF WBC: CPT

## 2023-11-27 PROCEDURE — 96365 THER/PROPH/DIAG IV INF INIT: CPT

## 2023-11-27 PROCEDURE — 99285 EMERGENCY DEPT VISIT HI MDM: CPT

## 2023-11-27 PROCEDURE — 85652 RBC SED RATE AUTOMATED: CPT

## 2023-11-27 PROCEDURE — 6360000002 HC RX W HCPCS: Performed by: INTERNAL MEDICINE

## 2023-11-27 PROCEDURE — 96376 TX/PRO/DX INJ SAME DRUG ADON: CPT

## 2023-11-27 PROCEDURE — 6360000002 HC RX W HCPCS

## 2023-11-27 PROCEDURE — 86403 PARTICLE AGGLUT ANTBDY SCRN: CPT

## 2023-11-27 PROCEDURE — 87040 BLOOD CULTURE FOR BACTERIA: CPT

## 2023-11-27 PROCEDURE — 86140 C-REACTIVE PROTEIN: CPT

## 2023-11-27 PROCEDURE — 87070 CULTURE OTHR SPECIMN AEROBIC: CPT

## 2023-11-27 PROCEDURE — 90471 IMMUNIZATION ADMIN: CPT

## 2023-11-27 PROCEDURE — 90715 TDAP VACCINE 7 YRS/> IM: CPT

## 2023-11-27 PROCEDURE — 2580000003 HC RX 258

## 2023-11-27 PROCEDURE — 2580000003 HC RX 258: Performed by: INTERNAL MEDICINE

## 2023-11-27 PROCEDURE — 2500000003 HC RX 250 WO HCPCS: Performed by: INTERNAL MEDICINE

## 2023-11-27 PROCEDURE — 73701 CT LOWER EXTREMITY W/DYE: CPT

## 2023-11-27 PROCEDURE — 1210000000 HC MED SURG R&B

## 2023-11-27 RX ORDER — SODIUM CHLORIDE 0.9 % (FLUSH) 0.9 %
10 SYRINGE (ML) INJECTION PRN
Status: DISCONTINUED | OUTPATIENT
Start: 2023-11-27 | End: 2023-12-03 | Stop reason: HOSPADM

## 2023-11-27 RX ORDER — MORPHINE SULFATE 4 MG/ML
4 INJECTION, SOLUTION INTRAMUSCULAR; INTRAVENOUS ONCE
Status: COMPLETED | OUTPATIENT
Start: 2023-11-27 | End: 2023-11-27

## 2023-11-27 RX ORDER — ENOXAPARIN SODIUM 100 MG/ML
40 INJECTION SUBCUTANEOUS DAILY
Status: DISCONTINUED | OUTPATIENT
Start: 2023-11-28 | End: 2023-12-03 | Stop reason: HOSPADM

## 2023-11-27 RX ORDER — PROMETHAZINE HYDROCHLORIDE 12.5 MG/1
12.5 TABLET ORAL EVERY 6 HOURS PRN
Status: DISCONTINUED | OUTPATIENT
Start: 2023-11-27 | End: 2023-12-03 | Stop reason: HOSPADM

## 2023-11-27 RX ORDER — ACETAMINOPHEN 325 MG/1
650 TABLET ORAL EVERY 6 HOURS PRN
Status: DISCONTINUED | OUTPATIENT
Start: 2023-11-27 | End: 2023-12-03 | Stop reason: HOSPADM

## 2023-11-27 RX ORDER — OXYCODONE HYDROCHLORIDE 5 MG/1
5 TABLET ORAL EVERY 4 HOURS PRN
Status: DISCONTINUED | OUTPATIENT
Start: 2023-11-27 | End: 2023-12-03 | Stop reason: HOSPADM

## 2023-11-27 RX ORDER — ONDANSETRON 2 MG/ML
4 INJECTION INTRAMUSCULAR; INTRAVENOUS EVERY 6 HOURS PRN
Status: DISCONTINUED | OUTPATIENT
Start: 2023-11-27 | End: 2023-12-03 | Stop reason: HOSPADM

## 2023-11-27 RX ORDER — SODIUM CHLORIDE 9 MG/ML
INJECTION, SOLUTION INTRAVENOUS CONTINUOUS
Status: DISPENSED | OUTPATIENT
Start: 2023-11-27 | End: 2023-11-28

## 2023-11-27 RX ORDER — DEXTROSE MONOHYDRATE 100 MG/ML
INJECTION, SOLUTION INTRAVENOUS CONTINUOUS PRN
Status: DISCONTINUED | OUTPATIENT
Start: 2023-11-27 | End: 2023-12-03 | Stop reason: HOSPADM

## 2023-11-27 RX ORDER — ONDANSETRON 2 MG/ML
4 INJECTION INTRAMUSCULAR; INTRAVENOUS ONCE
Status: COMPLETED | OUTPATIENT
Start: 2023-11-27 | End: 2023-11-27

## 2023-11-27 RX ORDER — INSULIN LISPRO 100 [IU]/ML
5 INJECTION, SOLUTION INTRAVENOUS; SUBCUTANEOUS
Status: DISCONTINUED | OUTPATIENT
Start: 2023-11-28 | End: 2023-12-03 | Stop reason: HOSPADM

## 2023-11-27 RX ORDER — SODIUM CHLORIDE 9 MG/ML
INJECTION, SOLUTION INTRAVENOUS PRN
Status: DISCONTINUED | OUTPATIENT
Start: 2023-11-27 | End: 2023-12-03 | Stop reason: HOSPADM

## 2023-11-27 RX ORDER — SODIUM CHLORIDE 0.9 % (FLUSH) 0.9 %
10 SYRINGE (ML) INJECTION EVERY 12 HOURS SCHEDULED
Status: DISCONTINUED | OUTPATIENT
Start: 2023-11-27 | End: 2023-12-03 | Stop reason: HOSPADM

## 2023-11-27 RX ORDER — ACETAMINOPHEN 650 MG/1
650 SUPPOSITORY RECTAL EVERY 6 HOURS PRN
Status: DISCONTINUED | OUTPATIENT
Start: 2023-11-27 | End: 2023-12-03 | Stop reason: HOSPADM

## 2023-11-27 RX ORDER — INSULIN LISPRO 100 [IU]/ML
0-4 INJECTION, SOLUTION INTRAVENOUS; SUBCUTANEOUS NIGHTLY
Status: DISCONTINUED | OUTPATIENT
Start: 2023-11-27 | End: 2023-12-03 | Stop reason: HOSPADM

## 2023-11-27 RX ORDER — INSULIN LISPRO 100 [IU]/ML
0-8 INJECTION, SOLUTION INTRAVENOUS; SUBCUTANEOUS
Status: DISCONTINUED | OUTPATIENT
Start: 2023-11-28 | End: 2023-12-03 | Stop reason: HOSPADM

## 2023-11-27 RX ORDER — LISINOPRIL 20 MG/1
20 TABLET ORAL DAILY
Status: DISCONTINUED | OUTPATIENT
Start: 2023-11-28 | End: 2023-12-03 | Stop reason: HOSPADM

## 2023-11-27 RX ORDER — INSULIN GLARGINE 100 [IU]/ML
20 INJECTION, SOLUTION SUBCUTANEOUS NIGHTLY
Status: DISCONTINUED | OUTPATIENT
Start: 2023-11-27 | End: 2023-12-03 | Stop reason: HOSPADM

## 2023-11-27 RX ORDER — HYDROMORPHONE HYDROCHLORIDE 1 MG/ML
0.5 INJECTION, SOLUTION INTRAMUSCULAR; INTRAVENOUS; SUBCUTANEOUS EVERY 4 HOURS PRN
Status: DISCONTINUED | OUTPATIENT
Start: 2023-11-27 | End: 2023-12-03 | Stop reason: HOSPADM

## 2023-11-27 RX ORDER — POLYETHYLENE GLYCOL 3350 17 G/17G
17 POWDER, FOR SOLUTION ORAL DAILY PRN
Status: DISCONTINUED | OUTPATIENT
Start: 2023-11-27 | End: 2023-12-03 | Stop reason: HOSPADM

## 2023-11-27 RX ORDER — GABAPENTIN 300 MG/1
300 CAPSULE ORAL 3 TIMES DAILY
Status: DISCONTINUED | OUTPATIENT
Start: 2023-11-27 | End: 2023-12-03 | Stop reason: HOSPADM

## 2023-11-27 RX ORDER — DOCUSATE SODIUM 100 MG/1
100 CAPSULE, LIQUID FILLED ORAL 2 TIMES DAILY
Status: DISCONTINUED | OUTPATIENT
Start: 2023-11-27 | End: 2023-12-03 | Stop reason: HOSPADM

## 2023-11-27 RX ADMIN — DOCUSATE SODIUM 100 MG: 100 CAPSULE, LIQUID FILLED ORAL at 21:20

## 2023-11-27 RX ADMIN — IOPAMIDOL 75 ML: 755 INJECTION, SOLUTION INTRAVENOUS at 17:45

## 2023-11-27 RX ADMIN — MORPHINE SULFATE 4 MG: 4 INJECTION INTRAVENOUS at 16:38

## 2023-11-27 RX ADMIN — ONDANSETRON 4 MG: 2 INJECTION INTRAMUSCULAR; INTRAVENOUS at 16:38

## 2023-11-27 RX ADMIN — SODIUM CHLORIDE: 9 INJECTION, SOLUTION INTRAVENOUS at 21:13

## 2023-11-27 RX ADMIN — PIPERACILLIN AND TAZOBACTAM 3375 MG: 3; .375 INJECTION, POWDER, LYOPHILIZED, FOR SOLUTION INTRAVENOUS at 21:21

## 2023-11-27 RX ADMIN — HYDROMORPHONE HYDROCHLORIDE 0.5 MG: 1 INJECTION, SOLUTION INTRAMUSCULAR; INTRAVENOUS; SUBCUTANEOUS at 21:17

## 2023-11-27 RX ADMIN — TETANUS TOXOID, REDUCED DIPHTHERIA TOXOID AND ACELLULAR PERTUSSIS VACCINE, ADSORBED 0.5 ML: 5; 2.5; 8; 8; 2.5 SUSPENSION INTRAMUSCULAR at 16:38

## 2023-11-27 RX ADMIN — INSULIN GLARGINE 20 UNITS: 100 INJECTION, SOLUTION SUBCUTANEOUS at 21:15

## 2023-11-27 RX ADMIN — GABAPENTIN 300 MG: 300 CAPSULE ORAL at 21:20

## 2023-11-27 RX ADMIN — MORPHINE SULFATE 4 MG: 4 INJECTION INTRAVENOUS at 19:53

## 2023-11-27 RX ADMIN — VANCOMYCIN HYDROCHLORIDE 1000 MG: 1 INJECTION, POWDER, LYOPHILIZED, FOR SOLUTION INTRAVENOUS at 19:44

## 2023-11-27 ASSESSMENT — ENCOUNTER SYMPTOMS
VOMITING: 0
COUGH: 0
DIARRHEA: 0
SHORTNESS OF BREATH: 0
NAUSEA: 0
ABDOMINAL PAIN: 0
SORE THROAT: 0
BACK PAIN: 0

## 2023-11-27 ASSESSMENT — PAIN - FUNCTIONAL ASSESSMENT
PAIN_FUNCTIONAL_ASSESSMENT: PREVENTS OR INTERFERES WITH ALL ACTIVE AND SOME PASSIVE ACTIVITIES
PAIN_FUNCTIONAL_ASSESSMENT: 0-10
PAIN_FUNCTIONAL_ASSESSMENT: PREVENTS OR INTERFERES SOME ACTIVE ACTIVITIES AND ADLS

## 2023-11-27 ASSESSMENT — PAIN SCALES - GENERAL
PAINLEVEL_OUTOF10: 8
PAINLEVEL_OUTOF10: 9
PAINLEVEL_OUTOF10: 8

## 2023-11-27 ASSESSMENT — PAIN DESCRIPTION - LOCATION
LOCATION: FOOT;HAND
LOCATION: HAND;FOOT
LOCATION: FOOT;HAND

## 2023-11-27 ASSESSMENT — PAIN DESCRIPTION - PAIN TYPE: TYPE: ACUTE PAIN

## 2023-11-27 ASSESSMENT — LIFESTYLE VARIABLES
HOW MANY STANDARD DRINKS CONTAINING ALCOHOL DO YOU HAVE ON A TYPICAL DAY: PATIENT DOES NOT DRINK
HOW OFTEN DO YOU HAVE A DRINK CONTAINING ALCOHOL: NEVER

## 2023-11-27 ASSESSMENT — PAIN DESCRIPTION - FREQUENCY: FREQUENCY: CONTINUOUS

## 2023-11-27 ASSESSMENT — PAIN DESCRIPTION - DESCRIPTORS
DESCRIPTORS: CRUSHING;THROBBING
DESCRIPTORS: THROBBING

## 2023-11-27 ASSESSMENT — PAIN DESCRIPTION - ORIENTATION: ORIENTATION: RIGHT;LEFT

## 2023-11-27 ASSESSMENT — PAIN DESCRIPTION - ONSET: ONSET: GRADUAL

## 2023-11-27 NOTE — ED PROVIDER NOTES
[address] [facility]  eMERGENCYdEPARTMENT eNCOUnter      Pt Name: Lety Frazier  MRN: [id number]  Birthdate 1965of evaluation: 11/27/2023  Provider:Sue Villarreal, [facility]       Chief Complaint   Patient presents with    Hand Injury    Foot Injury     Hand and Foot Injuries that led to possible infections. HISTORY OF PRESENT ILLNESS  (Location/Symptom, Timing/Onset, Context/Setting, Quality, Duration, Modifying Factors, Severity.)   Lety Frazier is a 61 y.o. male  hx of HTN, OA, DM, who presents to the emergency department with left hand and right foot pain. Presents to the emergency department with concerns for to possible wound infection. Patient states on his left hand he noticed a callus to the base of his fourth digit a week ago that cracked open he then noticed over the last couple days redness and swelling to the left fourth finger into the dorsal surface of the hand. Patient states he noticed a callus to his right great toe a few days ago he shaved the skin off and then noticed color change pain and swelling extending from the right great toe up the foot over the last day. He rates his pain an 8 out of 10 throb to the right great toe and to the left hand. He is right-hand dominant he does not know when his last tetanus vaccination was. He denies being diabetic although his chart mentions type 2 diabetes. He has not taken anything for his pain today. He denies any chest pain, shortness of breath, abdominal pain, nausea, vomiting, diarrhea, fever, chills, headache, or recent  illness. HPI    Nursing Notes were reviewed and I agree. REVIEW OF SYSTEMS    (2-9 systems for level 4, 10 or more for level 5)     Review of Systems   Constitutional:  Negative for activity change, chills and fever. HENT:  Negative for ear pain and sore throat. Eyes:  Negative for visual disturbance. Respiratory:  Negative for cough and shortness of breath.     Cardiovascular:

## 2023-11-27 NOTE — ED TRIAGE NOTES
Pt reports swelling and concern for infection to right great toe x 3 days. Pt reports concern for infection and swelling to left hand.

## 2023-11-28 PROBLEM — E11.65 TYPE 2 DIABETES MELLITUS WITH HYPERGLYCEMIA, WITHOUT LONG-TERM CURRENT USE OF INSULIN (HCC): Status: ACTIVE | Noted: 2023-11-28

## 2023-11-28 PROBLEM — M86.171 ACUTE OSTEOMYELITIS OF TOE, RIGHT (HCC): Status: ACTIVE | Noted: 2023-11-28

## 2023-11-28 PROBLEM — L03.114 CELLULITIS OF LEFT HAND: Status: ACTIVE | Noted: 2023-11-28

## 2023-11-28 LAB
ANION GAP SERPL CALCULATED.3IONS-SCNC: 11 MEQ/L (ref 9–15)
BASOPHILS # BLD: 0.1 K/UL (ref 0–0.1)
BASOPHILS NFR BLD: 0.6 % (ref 0.2–1.2)
BUN SERPL-MCNC: 13 MG/DL (ref 6–20)
CALCIUM SERPL-MCNC: 9.5 MG/DL (ref 8.5–9.9)
CHLORIDE SERPL-SCNC: 98 MEQ/L (ref 95–107)
CO2 SERPL-SCNC: 27 MEQ/L (ref 20–31)
CREAT SERPL-MCNC: 0.74 MG/DL (ref 0.7–1.2)
EOSINOPHIL # BLD: 0.5 K/UL (ref 0–0.5)
EOSINOPHIL NFR BLD: 3 % (ref 0.8–7)
ERYTHROCYTE [DISTWIDTH] IN BLOOD BY AUTOMATED COUNT: 11.9 % (ref 11.6–14.4)
GLUCOSE BLD-MCNC: 147 MG/DL (ref 70–99)
GLUCOSE BLD-MCNC: 155 MG/DL (ref 70–99)
GLUCOSE BLD-MCNC: 244 MG/DL (ref 70–99)
GLUCOSE BLD-MCNC: 252 MG/DL (ref 70–99)
GLUCOSE SERPL-MCNC: 305 MG/DL (ref 70–99)
HBA1C MFR BLD: 9.2 % (ref 4.8–5.9)
HCT VFR BLD AUTO: 45.3 % (ref 42–52)
HGB BLD-MCNC: 15.3 G/DL (ref 13.7–17.5)
IMM GRANULOCYTES # BLD: 0.1 K/UL
IMM GRANULOCYTES NFR BLD: 0.4 %
LYMPHOCYTES # BLD: 3.6 K/UL (ref 1.3–3.6)
LYMPHOCYTES NFR BLD: 21 %
MCH RBC QN AUTO: 30.4 PG (ref 25.7–32.2)
MCHC RBC AUTO-ENTMCNC: 33.8 % (ref 32.3–36.5)
MCV RBC AUTO: 90.1 FL (ref 79–92.2)
MONOCYTES # BLD: 1.3 K/UL (ref 0.3–0.8)
MONOCYTES NFR BLD: 7.7 % (ref 5.3–12.2)
NEUTROPHILS # BLD: 11.5 K/UL (ref 1.8–5.4)
NEUTS SEG NFR BLD: 67.3 % (ref 34–67.9)
PERFORMED ON: ABNORMAL
PHOSPHATE SERPL-MCNC: 3.8 MG/DL (ref 2.3–4.8)
PLATELET # BLD AUTO: 288 K/UL (ref 163–337)
POTASSIUM SERPL-SCNC: 4.5 MEQ/L (ref 3.4–4.9)
RBC # BLD AUTO: 5.03 M/UL (ref 4.63–6.08)
SODIUM SERPL-SCNC: 136 MEQ/L (ref 135–144)
WBC # BLD AUTO: 17.1 K/UL (ref 4.2–9)

## 2023-11-28 PROCEDURE — 84100 ASSAY OF PHOSPHORUS: CPT

## 2023-11-28 PROCEDURE — 2580000003 HC RX 258: Performed by: INTERNAL MEDICINE

## 2023-11-28 PROCEDURE — 36415 COLL VENOUS BLD VENIPUNCTURE: CPT

## 2023-11-28 PROCEDURE — 6370000000 HC RX 637 (ALT 250 FOR IP): Performed by: INTERNAL MEDICINE

## 2023-11-28 PROCEDURE — 80048 BASIC METABOLIC PNL TOTAL CA: CPT

## 2023-11-28 PROCEDURE — 99222 1ST HOSP IP/OBS MODERATE 55: CPT | Performed by: INTERNAL MEDICINE

## 2023-11-28 PROCEDURE — 85025 COMPLETE CBC W/AUTO DIFF WBC: CPT

## 2023-11-28 PROCEDURE — 0HDRXZZ EXTRACTION OF TOE NAIL, EXTERNAL APPROACH: ICD-10-PCS | Performed by: PODIATRIST

## 2023-11-28 PROCEDURE — 6370000000 HC RX 637 (ALT 250 FOR IP): Performed by: PODIATRIST

## 2023-11-28 PROCEDURE — 1210000000 HC MED SURG R&B

## 2023-11-28 PROCEDURE — 83036 HEMOGLOBIN GLYCOSYLATED A1C: CPT

## 2023-11-28 PROCEDURE — 2500000003 HC RX 250 WO HCPCS: Performed by: INTERNAL MEDICINE

## 2023-11-28 PROCEDURE — 6360000002 HC RX W HCPCS: Performed by: INTERNAL MEDICINE

## 2023-11-28 RX ORDER — NICOTINE 21 MG/24HR
1 PATCH, TRANSDERMAL 24 HOURS TRANSDERMAL DAILY
Status: DISCONTINUED | OUTPATIENT
Start: 2023-11-28 | End: 2023-12-03 | Stop reason: HOSPADM

## 2023-11-28 RX ADMIN — INSULIN LISPRO 4 UNITS: 100 INJECTION, SOLUTION INTRAVENOUS; SUBCUTANEOUS at 08:25

## 2023-11-28 RX ADMIN — INSULIN LISPRO 5 UNITS: 100 INJECTION, SOLUTION INTRAVENOUS; SUBCUTANEOUS at 18:01

## 2023-11-28 RX ADMIN — GABAPENTIN 300 MG: 300 CAPSULE ORAL at 20:46

## 2023-11-28 RX ADMIN — Medication 10 ML: at 20:45

## 2023-11-28 RX ADMIN — INSULIN GLARGINE 20 UNITS: 100 INJECTION, SOLUTION SUBCUTANEOUS at 20:49

## 2023-11-28 RX ADMIN — INSULIN LISPRO 5 UNITS: 100 INJECTION, SOLUTION INTRAVENOUS; SUBCUTANEOUS at 12:23

## 2023-11-28 RX ADMIN — PIPERACILLIN AND TAZOBACTAM 3375 MG: 3; .375 INJECTION, POWDER, LYOPHILIZED, FOR SOLUTION INTRAVENOUS at 20:42

## 2023-11-28 RX ADMIN — VANCOMYCIN HYDROCHLORIDE 1250 MG: 1.25 INJECTION, POWDER, LYOPHILIZED, FOR SOLUTION INTRAVENOUS at 06:31

## 2023-11-28 RX ADMIN — OXYCODONE HYDROCHLORIDE 5 MG: 5 TABLET ORAL at 17:34

## 2023-11-28 RX ADMIN — GABAPENTIN 300 MG: 300 CAPSULE ORAL at 08:24

## 2023-11-28 RX ADMIN — OXYCODONE HYDROCHLORIDE 5 MG: 5 TABLET ORAL at 12:22

## 2023-11-28 RX ADMIN — DOCUSATE SODIUM 100 MG: 100 CAPSULE, LIQUID FILLED ORAL at 20:46

## 2023-11-28 RX ADMIN — OXYCODONE HYDROCHLORIDE 5 MG: 5 TABLET ORAL at 21:15

## 2023-11-28 RX ADMIN — DOCUSATE SODIUM 100 MG: 100 CAPSULE, LIQUID FILLED ORAL at 08:24

## 2023-11-28 RX ADMIN — HYDROMORPHONE HYDROCHLORIDE 0.5 MG: 1 INJECTION, SOLUTION INTRAMUSCULAR; INTRAVENOUS; SUBCUTANEOUS at 15:07

## 2023-11-28 RX ADMIN — OXYCODONE HYDROCHLORIDE 5 MG: 5 TABLET ORAL at 04:53

## 2023-11-28 RX ADMIN — HYDROMORPHONE HYDROCHLORIDE 0.5 MG: 1 INJECTION, SOLUTION INTRAMUSCULAR; INTRAVENOUS; SUBCUTANEOUS at 08:16

## 2023-11-28 RX ADMIN — LISINOPRIL 20 MG: 20 TABLET ORAL at 08:24

## 2023-11-28 RX ADMIN — VANCOMYCIN HYDROCHLORIDE 1250 MG: 1.25 INJECTION, POWDER, LYOPHILIZED, FOR SOLUTION INTRAVENOUS at 17:59

## 2023-11-28 RX ADMIN — PIPERACILLIN AND TAZOBACTAM 3375 MG: 3; .375 INJECTION, POWDER, LYOPHILIZED, FOR SOLUTION INTRAVENOUS at 12:25

## 2023-11-28 RX ADMIN — GABAPENTIN 300 MG: 300 CAPSULE ORAL at 12:22

## 2023-11-28 RX ADMIN — INSULIN LISPRO 5 UNITS: 100 INJECTION, SOLUTION INTRAVENOUS; SUBCUTANEOUS at 08:25

## 2023-11-28 RX ADMIN — ENOXAPARIN SODIUM 40 MG: 100 INJECTION SUBCUTANEOUS at 08:24

## 2023-11-28 RX ADMIN — HYOSCYAMINE SULFATE: 16 SOLUTION at 13:25

## 2023-11-28 RX ADMIN — PIPERACILLIN AND TAZOBACTAM 3375 MG: 3; .375 INJECTION, POWDER, LYOPHILIZED, FOR SOLUTION INTRAVENOUS at 04:49

## 2023-11-28 ASSESSMENT — ENCOUNTER SYMPTOMS
GASTROINTESTINAL NEGATIVE: 1
ALLERGIC/IMMUNOLOGIC NEGATIVE: 1
RESPIRATORY NEGATIVE: 1
EYES NEGATIVE: 1
COLOR CHANGE: 1

## 2023-11-28 ASSESSMENT — PAIN DESCRIPTION - DESCRIPTORS
DESCRIPTORS: ACHING
DESCRIPTORS: ACHING;THROBBING
DESCRIPTORS: THROBBING

## 2023-11-28 ASSESSMENT — PAIN SCALES - GENERAL
PAINLEVEL_OUTOF10: 10
PAINLEVEL_OUTOF10: 10
PAINLEVEL_OUTOF10: 6
PAINLEVEL_OUTOF10: 7
PAINLEVEL_OUTOF10: 9
PAINLEVEL_OUTOF10: 10

## 2023-11-28 ASSESSMENT — PAIN DESCRIPTION - LOCATION
LOCATION: HAND

## 2023-11-28 ASSESSMENT — PAIN DESCRIPTION - ORIENTATION
ORIENTATION: LEFT

## 2023-11-28 NOTE — H&P
Hospital Medicine History & Physical      PCP: Isael Latham MD    Date of Admission: 11/27/2023    Date of Service: 11/28/23      Chief Complaint:  L great toe cellulitis, L hand cellulitis       History Of Present Illness:  61 y.o. male who presented to [facility] with above complains. Patient noted discoloration/discomfort in L great toe about 6 mts ago. For the past 4-5 days he noted increased in toe edema/redness/pain. Also had L hand Dupuytren contraction with developing callus on top of tendon, had mechanical cut of the skin. Since redness/pain he came to ER and after initial stabilization was admitted for further management. Patient has PMH of HTN/DM, non compliant with medications. Denied fever/dyspnea/CP       Past Medical History:          Diagnosis Date    Hypertension     Osteoarthritis        Past Surgical History:          Procedure Laterality Date    JOINT REPLACEMENT      KNEE SURGERY Left 06/09/2015       Medications Prior to Admission:      Prior to Admission medications    Medication Sig Start Date End Date Taking? Authorizing Provider   naproxen (NAPROSYN) 500 MG tablet Take 1 tablet by mouth 2 times daily for 20 doses 7/12/21 7/22/21  Siobhan Travis MD   methadone 10 MG/5ML solution Take 10 mg by mouth every 4 hours as needed for Pain. Patient not taking: Reported on 11/27/2023    Provider, MD Darlene   meloxicam (MOBIC) 15 MG tablet Take 1 tablet by mouth daily as needed for Pain  Patient not taking: Reported on 11/27/2023 2/7/21   Aime Lazcano MD   lisinopril (PRINIVIL;ZESTRIL) 20 MG tablet Take 1 tablet by mouth daily  Patient not taking: Reported on 11/27/2023 6/21/18   Isael Latham MD   blood glucose test strips (ASCENSIA AUTODISC VI;ONE TOUCH ULTRA TEST VI) strip Test once daily as directed.   Patient not taking: Reported on 11/27/2023 6/21/18   Isael Latham MD   SOFT TOUCH LANCETS MISC 1 each by Does not apply route daily  Patient not taking: Reported on

## 2023-11-28 NOTE — PLAN OF CARE
Problem: Discharge Planning  Goal: Discharge to home or other facility with appropriate resources  Outcome: Progressing  Flowsheets (Taken 11/27/2023 2039 by Ekta Gallegos RN)  Discharge to home or other facility with appropriate resources: Identify barriers to discharge with patient and caregiver     Problem: Pain  Goal: Verbalizes/displays adequate comfort level or baseline comfort level  Outcome: Progressing     Problem: ABCDS Injury Assessment  Goal: Absence of physical injury  Outcome: Progressing

## 2023-11-28 NOTE — CONSULTS
History and Physical/Consultation Note  Orthopedic Surgery     Patient: Meryl Houser  Unit/Bed:0222/0222-01  YOB: 1965  MRN: [id number]  Acct: [de-identified]   Admitting Diagnosis: Osteomyelitis ([address]) [M86.9]  Cellulitis of left foot [L03.116]  Cellulitis of left hand [F87.567]  Admit Date:  11/27/2023  Hospital Day: 1    Chief Complaint: Left hand pain, redness, swelling     History of Present Illness: Edson Litten is a 27-year-old male who presented to the emergency room at [facility] due to pain, redness, and swelling of his left hand as well as issues with his left great toe. He states that he always has callus over the volar aspect of the left palm and ring finger at baseline. He also states that he often gets cuts in the skin of his hand. He states that he did have a cut in his skin along the volar surface of the ring finger that became red and painful. He believes this has been ongoing for approximately 10 days or so. Since he came to the hospital, IV antibiotics were started. The erythema around his hand/finger were marked with a pen. He states that the pain and redness have been improving. He is currently on Vancomycin and Zosyn. He denies any numbness or tingling in the hand or fingers.     No Known Allergies    Current Facility-Administered Medications   Medication Dose Route Frequency Provider Last Rate Last Admin    nicotine (NICODERM CQ) 21 MG/24HR 1 patch  1 patch TransDERmal Daily Jimmy Chopra MD   1 patch at 11/28/23 1324    Sodium Hypochlorite (DAKINS) 0.25 % half strength external soln   Irrigation Daily Aury Alexis DPM   Given at 11/28/23 1325    lisinopril (PRINIVIL;ZESTRIL) tablet 20 mg  20 mg Oral Daily Jaswinder Casillas MD   20 mg at 11/28/23 0824    gabapentin (NEURONTIN) capsule 300 mg  300 mg Oral TID Jaswinder Casillas MD   300 mg at 11/28/23 1222    docusate sodium (COLACE) capsule 100 mg  100 mg Oral BID Hilton Cabral MD   100 mg at 11/28/23 0824    insulin
Cult,Aerobe/Anaerobe:  PENDING              Thank you for the consult.      Mirela Bailey DPM  November 28, 2023  1:02 PM

## 2023-11-28 NOTE — ED NOTES
Dr. Genet Suazo and Dr. Huong Nino called and spoke with Fany Diaz NP. Dr. Huong Nino accepted patient.       Patricia Proctor  11/27/23 1957

## 2023-11-29 PROBLEM — L02.519 HAND ABSCESS: Status: ACTIVE | Noted: 2023-11-29

## 2023-11-29 PROBLEM — L02.512 ABSCESS OF FINGER, LEFT: Status: ACTIVE | Noted: 2023-11-29

## 2023-11-29 LAB
ANION GAP SERPL CALCULATED.3IONS-SCNC: 10 MEQ/L (ref 9–15)
BASOPHILS # BLD: 0.1 K/UL (ref 0–0.1)
BASOPHILS NFR BLD: 0.7 % (ref 0.2–1.2)
BUN SERPL-MCNC: 14 MG/DL (ref 6–20)
CALCIUM SERPL-MCNC: 9.4 MG/DL (ref 8.5–9.9)
CHLORIDE SERPL-SCNC: 98 MEQ/L (ref 95–107)
CO2 SERPL-SCNC: 25 MEQ/L (ref 20–31)
CREAT SERPL-MCNC: 0.63 MG/DL (ref 0.7–1.2)
EOSINOPHIL # BLD: 0.5 K/UL (ref 0–0.5)
EOSINOPHIL NFR BLD: 3.3 % (ref 0.8–7)
ERYTHROCYTE [DISTWIDTH] IN BLOOD BY AUTOMATED COUNT: 12.1 % (ref 11.6–14.4)
GLUCOSE BLD-MCNC: 111 MG/DL (ref 70–99)
GLUCOSE BLD-MCNC: 164 MG/DL (ref 70–99)
GLUCOSE BLD-MCNC: 171 MG/DL (ref 70–99)
GLUCOSE BLD-MCNC: 175 MG/DL (ref 70–99)
GLUCOSE BLD-MCNC: 261 MG/DL (ref 70–99)
GLUCOSE BLD-MCNC: 94 MG/DL (ref 70–99)
GLUCOSE SERPL-MCNC: 161 MG/DL (ref 70–99)
HCT VFR BLD AUTO: 41.9 % (ref 42–52)
HGB BLD-MCNC: 13.8 G/DL (ref 13.7–17.5)
IMM GRANULOCYTES # BLD: 0.1 K/UL
IMM GRANULOCYTES NFR BLD: 0.5 %
LYMPHOCYTES # BLD: 3.4 K/UL (ref 1.3–3.6)
LYMPHOCYTES NFR BLD: 22.7 %
MCH RBC QN AUTO: 29.9 PG (ref 25.7–32.2)
MCHC RBC AUTO-ENTMCNC: 32.9 % (ref 32.3–36.5)
MCV RBC AUTO: 90.9 FL (ref 79–92.2)
MONOCYTES # BLD: 1.2 K/UL (ref 0.3–0.8)
MONOCYTES NFR BLD: 7.9 % (ref 5.3–12.2)
NEUTROPHILS # BLD: 9.6 K/UL (ref 1.8–5.4)
NEUTS SEG NFR BLD: 64.9 % (ref 34–67.9)
PERFORMED ON: ABNORMAL
PERFORMED ON: NORMAL
PLATELET # BLD AUTO: 249 K/UL (ref 163–337)
POTASSIUM SERPL-SCNC: 4.4 MEQ/L (ref 3.4–4.9)
RBC # BLD AUTO: 4.61 M/UL (ref 4.63–6.08)
SODIUM SERPL-SCNC: 133 MEQ/L (ref 135–144)
VANCOMYCIN SERPL-MCNC: 7.1 UG/ML (ref 10–40)
WBC # BLD AUTO: 14.8 K/UL (ref 4.2–9)

## 2023-11-29 PROCEDURE — 87186 SC STD MICRODIL/AGAR DIL: CPT

## 2023-11-29 PROCEDURE — 2580000003 HC RX 258: Performed by: INTERNAL MEDICINE

## 2023-11-29 PROCEDURE — 2709999900 HC NON-CHARGEABLE SUPPLY: Performed by: STUDENT IN AN ORGANIZED HEALTH CARE EDUCATION/TRAINING PROGRAM

## 2023-11-29 PROCEDURE — 6360000002 HC RX W HCPCS: Performed by: INTERNAL MEDICINE

## 2023-11-29 PROCEDURE — 6370000000 HC RX 637 (ALT 250 FOR IP): Performed by: NURSE PRACTITIONER

## 2023-11-29 PROCEDURE — 2580000003 HC RX 258: Performed by: PHYSICIAN ASSISTANT

## 2023-11-29 PROCEDURE — 86403 PARTICLE AGGLUT ANTBDY SCRN: CPT

## 2023-11-29 PROCEDURE — 85025 COMPLETE CBC W/AUTO DIFF WBC: CPT

## 2023-11-29 PROCEDURE — 7100000011 HC PHASE II RECOVERY - ADDTL 15 MIN: Performed by: STUDENT IN AN ORGANIZED HEALTH CARE EDUCATION/TRAINING PROGRAM

## 2023-11-29 PROCEDURE — 87070 CULTURE OTHR SPECIMN AEROBIC: CPT

## 2023-11-29 PROCEDURE — 6370000000 HC RX 637 (ALT 250 FOR IP): Performed by: INTERNAL MEDICINE

## 2023-11-29 PROCEDURE — 0H9GXZZ DRAINAGE OF LEFT HAND SKIN, EXTERNAL APPROACH: ICD-10-PCS | Performed by: STUDENT IN AN ORGANIZED HEALTH CARE EDUCATION/TRAINING PROGRAM

## 2023-11-29 PROCEDURE — 2580000003 HC RX 258: Performed by: NURSE PRACTITIONER

## 2023-11-29 PROCEDURE — 2580000003 HC RX 258: Performed by: STUDENT IN AN ORGANIZED HEALTH CARE EDUCATION/TRAINING PROGRAM

## 2023-11-29 PROCEDURE — 7100000010 HC PHASE II RECOVERY - FIRST 15 MIN: Performed by: STUDENT IN AN ORGANIZED HEALTH CARE EDUCATION/TRAINING PROGRAM

## 2023-11-29 PROCEDURE — 36415 COLL VENOUS BLD VENIPUNCTURE: CPT

## 2023-11-29 PROCEDURE — 3600000003 HC SURGERY LEVEL 3 BASE: Performed by: STUDENT IN AN ORGANIZED HEALTH CARE EDUCATION/TRAINING PROGRAM

## 2023-11-29 PROCEDURE — 2500000003 HC RX 250 WO HCPCS: Performed by: NURSE PRACTITIONER

## 2023-11-29 PROCEDURE — 3600000013 HC SURGERY LEVEL 3 ADDTL 15MIN: Performed by: STUDENT IN AN ORGANIZED HEALTH CARE EDUCATION/TRAINING PROGRAM

## 2023-11-29 PROCEDURE — 6360000002 HC RX W HCPCS: Performed by: PHYSICIAN ASSISTANT

## 2023-11-29 PROCEDURE — 6360000002 HC RX W HCPCS: Performed by: NURSE PRACTITIONER

## 2023-11-29 PROCEDURE — 80048 BASIC METABOLIC PNL TOTAL CA: CPT

## 2023-11-29 PROCEDURE — 2500000003 HC RX 250 WO HCPCS: Performed by: STUDENT IN AN ORGANIZED HEALTH CARE EDUCATION/TRAINING PROGRAM

## 2023-11-29 PROCEDURE — 87075 CULTR BACTERIA EXCEPT BLOOD: CPT

## 2023-11-29 PROCEDURE — A4217 STERILE WATER/SALINE, 500 ML: HCPCS | Performed by: STUDENT IN AN ORGANIZED HEALTH CARE EDUCATION/TRAINING PROGRAM

## 2023-11-29 PROCEDURE — 80202 ASSAY OF VANCOMYCIN: CPT

## 2023-11-29 PROCEDURE — 1210000000 HC MED SURG R&B

## 2023-11-29 RX ORDER — FENTANYL CITRATE 0.05 MG/ML
50 INJECTION, SOLUTION INTRAMUSCULAR; INTRAVENOUS ONCE
Status: COMPLETED | OUTPATIENT
Start: 2023-11-29 | End: 2023-11-29

## 2023-11-29 RX ORDER — KETOROLAC TROMETHAMINE 15 MG/ML
7.5 INJECTION, SOLUTION INTRAMUSCULAR; INTRAVENOUS EVERY 6 HOURS
Status: COMPLETED | OUTPATIENT
Start: 2023-11-29 | End: 2023-11-30

## 2023-11-29 RX ORDER — LIDOCAINE HYDROCHLORIDE 10 MG/ML
INJECTION, SOLUTION EPIDURAL; INFILTRATION; INTRACAUDAL; PERINEURAL PRN
Status: DISCONTINUED | OUTPATIENT
Start: 2023-11-29 | End: 2023-11-29 | Stop reason: ALTCHOICE

## 2023-11-29 RX ORDER — SODIUM CHLORIDE, SODIUM LACTATE, POTASSIUM CHLORIDE, CALCIUM CHLORIDE 600; 310; 30; 20 MG/100ML; MG/100ML; MG/100ML; MG/100ML
INJECTION, SOLUTION INTRAVENOUS CONTINUOUS
Status: DISCONTINUED | OUTPATIENT
Start: 2023-11-29 | End: 2023-11-29 | Stop reason: HOSPADM

## 2023-11-29 RX ORDER — MAGNESIUM HYDROXIDE 1200 MG/15ML
LIQUID ORAL CONTINUOUS PRN
Status: COMPLETED | OUTPATIENT
Start: 2023-11-29 | End: 2023-11-29

## 2023-11-29 RX ADMIN — PIPERACILLIN AND TAZOBACTAM 3375 MG: 3; .375 INJECTION, POWDER, LYOPHILIZED, FOR SOLUTION INTRAVENOUS at 21:00

## 2023-11-29 RX ADMIN — FENTANYL CITRATE 50 MCG: 50 INJECTION INTRAMUSCULAR; INTRAVENOUS at 15:55

## 2023-11-29 RX ADMIN — OXYCODONE HYDROCHLORIDE 5 MG: 5 TABLET ORAL at 08:55

## 2023-11-29 RX ADMIN — VANCOMYCIN HYDROCHLORIDE 1000 MG: 1 INJECTION, POWDER, LYOPHILIZED, FOR SOLUTION INTRAVENOUS at 16:04

## 2023-11-29 RX ADMIN — PIPERACILLIN AND TAZOBACTAM 3375 MG: 3; .375 INJECTION, POWDER, LYOPHILIZED, FOR SOLUTION INTRAVENOUS at 17:09

## 2023-11-29 RX ADMIN — DOCUSATE SODIUM 100 MG: 100 CAPSULE, LIQUID FILLED ORAL at 08:07

## 2023-11-29 RX ADMIN — KETOROLAC TROMETHAMINE 7.5 MG: 15 INJECTION, SOLUTION INTRAMUSCULAR; INTRAVENOUS at 17:14

## 2023-11-29 RX ADMIN — LISINOPRIL 20 MG: 20 TABLET ORAL at 08:08

## 2023-11-29 RX ADMIN — HYOSCYAMINE SULFATE: 16 SOLUTION at 08:12

## 2023-11-29 RX ADMIN — HYDROMORPHONE HYDROCHLORIDE 0.5 MG: 1 INJECTION, SOLUTION INTRAMUSCULAR; INTRAVENOUS; SUBCUTANEOUS at 21:05

## 2023-11-29 RX ADMIN — OXYCODONE HYDROCHLORIDE 5 MG: 5 TABLET ORAL at 18:53

## 2023-11-29 RX ADMIN — GABAPENTIN 300 MG: 300 CAPSULE ORAL at 08:07

## 2023-11-29 RX ADMIN — INSULIN LISPRO 4 UNITS: 100 INJECTION, SOLUTION INTRAVENOUS; SUBCUTANEOUS at 11:49

## 2023-11-29 RX ADMIN — SODIUM CHLORIDE, POTASSIUM CHLORIDE, SODIUM LACTATE AND CALCIUM CHLORIDE: 600; 310; 30; 20 INJECTION, SOLUTION INTRAVENOUS at 13:01

## 2023-11-29 RX ADMIN — PIPERACILLIN AND TAZOBACTAM 3375 MG: 3; .375 INJECTION, POWDER, LYOPHILIZED, FOR SOLUTION INTRAVENOUS at 04:53

## 2023-11-29 RX ADMIN — GABAPENTIN 300 MG: 300 CAPSULE ORAL at 21:01

## 2023-11-29 RX ADMIN — ENOXAPARIN SODIUM 40 MG: 100 INJECTION SUBCUTANEOUS at 08:08

## 2023-11-29 RX ADMIN — DOCUSATE SODIUM 100 MG: 100 CAPSULE, LIQUID FILLED ORAL at 21:01

## 2023-11-29 RX ADMIN — VANCOMYCIN HYDROCHLORIDE 1250 MG: 1.25 INJECTION, POWDER, LYOPHILIZED, FOR SOLUTION INTRAVENOUS at 06:17

## 2023-11-29 RX ADMIN — KETOROLAC TROMETHAMINE 7.5 MG: 15 INJECTION, SOLUTION INTRAMUSCULAR; INTRAVENOUS at 22:55

## 2023-11-29 RX ADMIN — VANCOMYCIN HYDROCHLORIDE 1000 MG: 1 INJECTION, POWDER, LYOPHILIZED, FOR SOLUTION INTRAVENOUS at 22:52

## 2023-11-29 RX ADMIN — Medication 10 ML: at 08:12

## 2023-11-29 RX ADMIN — INSULIN LISPRO 5 UNITS: 100 INJECTION, SOLUTION INTRAVENOUS; SUBCUTANEOUS at 08:02

## 2023-11-29 RX ADMIN — GABAPENTIN 300 MG: 300 CAPSULE ORAL at 16:06

## 2023-11-29 RX ADMIN — OXYCODONE HYDROCHLORIDE 5 MG: 5 TABLET ORAL at 04:50

## 2023-11-29 RX ADMIN — Medication 10 ML: at 21:01

## 2023-11-29 RX ADMIN — INSULIN GLARGINE 20 UNITS: 100 INJECTION, SOLUTION SUBCUTANEOUS at 21:05

## 2023-11-29 ASSESSMENT — PAIN DESCRIPTION - ORIENTATION
ORIENTATION: LEFT

## 2023-11-29 ASSESSMENT — PAIN DESCRIPTION - LOCATION
LOCATION: HAND

## 2023-11-29 ASSESSMENT — PAIN - FUNCTIONAL ASSESSMENT
PAIN_FUNCTIONAL_ASSESSMENT: PREVENTS OR INTERFERES WITH MANY ACTIVE NOT PASSIVE ACTIVITIES
PAIN_FUNCTIONAL_ASSESSMENT: 0-10
PAIN_FUNCTIONAL_ASSESSMENT: PREVENTS OR INTERFERES WITH MANY ACTIVE NOT PASSIVE ACTIVITIES

## 2023-11-29 ASSESSMENT — PAIN SCALES - GENERAL
PAINLEVEL_OUTOF10: 10
PAINLEVEL_OUTOF10: 6
PAINLEVEL_OUTOF10: 8
PAINLEVEL_OUTOF10: 8
PAINLEVEL_OUTOF10: 7
PAINLEVEL_OUTOF10: 10
PAINLEVEL_OUTOF10: 9
PAINLEVEL_OUTOF10: 10

## 2023-11-29 ASSESSMENT — PAIN DESCRIPTION - DESCRIPTORS
DESCRIPTORS: ACHING
DESCRIPTORS: CRUSHING
DESCRIPTORS: CRUSHING
DESCRIPTORS: ACHING
DESCRIPTORS: CRAMPING

## 2023-11-29 ASSESSMENT — PAIN DESCRIPTION - FREQUENCY: FREQUENCY: CONTINUOUS

## 2023-11-29 ASSESSMENT — PAIN DESCRIPTION - PAIN TYPE: TYPE: ACUTE PAIN

## 2023-11-29 NOTE — OP NOTE
in normal sterile fashion using ChloraPrep. Next, the left hand was anesthetized using 1% local without epinephrine. A block was performed around the left wrist including the ulnar nerve and median nerve. The remainder was placed around the base of the index finger. A total of 19 mL of 1% lidocaine plain was used. This provided adequate analgesia for the patient. A 1 cm stab incision was made over the abscess on the ulnar base of the ring finger. There was immediate large amount of purulent fluid. This was cultured. A small straight hemostat was used to bluntly spread through the tissues. Significant amount of purulent material was expressed and milked out of the incision. Then a small amount of iodoform packing was placed into the incision site which was left open. The hand was dressed with 4 x 4's, Payam wrap, and an Ace wrap. Postoperative Management: He should continue on IV antibiotics, Vancomycin and Zosyn. Await culture results. Infectious diseases following. Recommend a change of the packing and dressing on Friday 12/1. I would like for him to work on finger range of motion and elevation for swelling. He should have appropriate pain control.     Electronically signed by Bebeto Chow MD on 11/29/2023 at 3:35 PM

## 2023-11-30 PROBLEM — L02.512 ABSCESS OF LEFT HAND: Status: ACTIVE | Noted: 2023-11-29

## 2023-11-30 PROBLEM — L03.116 CELLULITIS OF LEFT FOOT: Status: ACTIVE | Noted: 2023-11-30

## 2023-11-30 LAB
GLUCOSE BLD-MCNC: 124 MG/DL (ref 70–99)
GLUCOSE BLD-MCNC: 124 MG/DL (ref 70–99)
GLUCOSE BLD-MCNC: 130 MG/DL (ref 70–99)
GLUCOSE BLD-MCNC: 180 MG/DL (ref 70–99)
PERFORMED ON: ABNORMAL

## 2023-11-30 PROCEDURE — 2580000003 HC RX 258: Performed by: PHYSICIAN ASSISTANT

## 2023-11-30 PROCEDURE — G0108 DIAB MANAGE TRN  PER INDIV: HCPCS

## 2023-11-30 PROCEDURE — 6360000002 HC RX W HCPCS: Performed by: NURSE PRACTITIONER

## 2023-11-30 PROCEDURE — 2500000003 HC RX 250 WO HCPCS: Performed by: NURSE PRACTITIONER

## 2023-11-30 PROCEDURE — 2580000003 HC RX 258: Performed by: NURSE PRACTITIONER

## 2023-11-30 PROCEDURE — 6360000002 HC RX W HCPCS: Performed by: PHYSICIAN ASSISTANT

## 2023-11-30 PROCEDURE — 97110 THERAPEUTIC EXERCISES: CPT

## 2023-11-30 PROCEDURE — 99232 SBSQ HOSP IP/OBS MODERATE 35: CPT | Performed by: INTERNAL MEDICINE

## 2023-11-30 PROCEDURE — 6370000000 HC RX 637 (ALT 250 FOR IP): Performed by: NURSE PRACTITIONER

## 2023-11-30 PROCEDURE — 97166 OT EVAL MOD COMPLEX 45 MIN: CPT

## 2023-11-30 PROCEDURE — 2580000003 HC RX 258: Performed by: INTERNAL MEDICINE

## 2023-11-30 PROCEDURE — 1210000000 HC MED SURG R&B

## 2023-11-30 RX ORDER — SODIUM CHLORIDE 0.9 % (FLUSH) 0.9 %
5-40 SYRINGE (ML) INJECTION EVERY 12 HOURS SCHEDULED
Status: DISCONTINUED | OUTPATIENT
Start: 2023-11-30 | End: 2023-12-03 | Stop reason: HOSPADM

## 2023-11-30 RX ORDER — SODIUM CHLORIDE 0.9 % (FLUSH) 0.9 %
5-40 SYRINGE (ML) INJECTION PRN
Status: DISCONTINUED | OUTPATIENT
Start: 2023-11-30 | End: 2023-12-03 | Stop reason: HOSPADM

## 2023-11-30 RX ORDER — SODIUM CHLORIDE 9 MG/ML
25 INJECTION, SOLUTION INTRAVENOUS PRN
Status: DISCONTINUED | OUTPATIENT
Start: 2023-11-30 | End: 2023-12-03 | Stop reason: HOSPADM

## 2023-11-30 RX ORDER — LIDOCAINE HYDROCHLORIDE 10 MG/ML
5 INJECTION, SOLUTION EPIDURAL; INFILTRATION; INTRACAUDAL; PERINEURAL ONCE
Status: DISCONTINUED | OUTPATIENT
Start: 2023-11-30 | End: 2023-12-02

## 2023-11-30 RX ADMIN — OXYCODONE HYDROCHLORIDE 5 MG: 5 TABLET ORAL at 06:31

## 2023-11-30 RX ADMIN — ENOXAPARIN SODIUM 40 MG: 100 INJECTION SUBCUTANEOUS at 08:21

## 2023-11-30 RX ADMIN — OXYCODONE HYDROCHLORIDE 5 MG: 5 TABLET ORAL at 10:48

## 2023-11-30 RX ADMIN — OXYCODONE HYDROCHLORIDE 5 MG: 5 TABLET ORAL at 00:25

## 2023-11-30 RX ADMIN — VANCOMYCIN HYDROCHLORIDE 1000 MG: 1 INJECTION, POWDER, LYOPHILIZED, FOR SOLUTION INTRAVENOUS at 23:05

## 2023-11-30 RX ADMIN — KETOROLAC TROMETHAMINE 7.5 MG: 15 INJECTION, SOLUTION INTRAMUSCULAR; INTRAVENOUS at 05:52

## 2023-11-30 RX ADMIN — INSULIN GLARGINE 20 UNITS: 100 INJECTION, SOLUTION SUBCUTANEOUS at 21:10

## 2023-11-30 RX ADMIN — DOCUSATE SODIUM 100 MG: 100 CAPSULE, LIQUID FILLED ORAL at 21:03

## 2023-11-30 RX ADMIN — INSULIN LISPRO 5 UNITS: 100 INJECTION, SOLUTION INTRAVENOUS; SUBCUTANEOUS at 12:55

## 2023-11-30 RX ADMIN — INSULIN LISPRO 5 UNITS: 100 INJECTION, SOLUTION INTRAVENOUS; SUBCUTANEOUS at 17:45

## 2023-11-30 RX ADMIN — KETOROLAC TROMETHAMINE 7.5 MG: 15 INJECTION, SOLUTION INTRAMUSCULAR; INTRAVENOUS at 10:48

## 2023-11-30 RX ADMIN — DOCUSATE SODIUM 100 MG: 100 CAPSULE, LIQUID FILLED ORAL at 08:21

## 2023-11-30 RX ADMIN — PIPERACILLIN AND TAZOBACTAM 3375 MG: 3; .375 INJECTION, POWDER, LYOPHILIZED, FOR SOLUTION INTRAVENOUS at 21:16

## 2023-11-30 RX ADMIN — PIPERACILLIN AND TAZOBACTAM 3375 MG: 3; .375 INJECTION, POWDER, LYOPHILIZED, FOR SOLUTION INTRAVENOUS at 12:54

## 2023-11-30 RX ADMIN — LISINOPRIL 20 MG: 20 TABLET ORAL at 08:23

## 2023-11-30 RX ADMIN — OXYCODONE HYDROCHLORIDE 5 MG: 5 TABLET ORAL at 14:49

## 2023-11-30 RX ADMIN — HYDROMORPHONE HYDROCHLORIDE 0.5 MG: 1 INJECTION, SOLUTION INTRAMUSCULAR; INTRAVENOUS; SUBCUTANEOUS at 08:32

## 2023-11-30 RX ADMIN — PIPERACILLIN AND TAZOBACTAM 3375 MG: 3; .375 INJECTION, POWDER, LYOPHILIZED, FOR SOLUTION INTRAVENOUS at 05:55

## 2023-11-30 RX ADMIN — HYDROMORPHONE HYDROCHLORIDE 0.5 MG: 1 INJECTION, SOLUTION INTRAMUSCULAR; INTRAVENOUS; SUBCUTANEOUS at 02:57

## 2023-11-30 RX ADMIN — INSULIN LISPRO 5 UNITS: 100 INJECTION, SOLUTION INTRAVENOUS; SUBCUTANEOUS at 08:23

## 2023-11-30 RX ADMIN — GABAPENTIN 300 MG: 300 CAPSULE ORAL at 14:49

## 2023-11-30 RX ADMIN — Medication 10 ML: at 08:33

## 2023-11-30 RX ADMIN — VANCOMYCIN HYDROCHLORIDE 1000 MG: 1 INJECTION, POWDER, LYOPHILIZED, FOR SOLUTION INTRAVENOUS at 14:50

## 2023-11-30 RX ADMIN — HYDROMORPHONE HYDROCHLORIDE 0.5 MG: 1 INJECTION, SOLUTION INTRAMUSCULAR; INTRAVENOUS; SUBCUTANEOUS at 18:04

## 2023-11-30 RX ADMIN — HYDROMORPHONE HYDROCHLORIDE 0.5 MG: 1 INJECTION, SOLUTION INTRAMUSCULAR; INTRAVENOUS; SUBCUTANEOUS at 13:00

## 2023-11-30 RX ADMIN — Medication 10 ML: at 21:13

## 2023-11-30 RX ADMIN — OXYCODONE HYDROCHLORIDE 5 MG: 5 TABLET ORAL at 20:31

## 2023-11-30 RX ADMIN — ACETAMINOPHEN 650 MG: 325 TABLET ORAL at 00:25

## 2023-11-30 RX ADMIN — GABAPENTIN 300 MG: 300 CAPSULE ORAL at 08:21

## 2023-11-30 RX ADMIN — GABAPENTIN 300 MG: 300 CAPSULE ORAL at 21:03

## 2023-11-30 RX ADMIN — HYOSCYAMINE SULFATE: 16 SOLUTION at 08:25

## 2023-11-30 RX ADMIN — VANCOMYCIN HYDROCHLORIDE 1000 MG: 1 INJECTION, POWDER, LYOPHILIZED, FOR SOLUTION INTRAVENOUS at 05:58

## 2023-11-30 RX ADMIN — Medication 10 ML: at 21:21

## 2023-11-30 ASSESSMENT — PAIN DESCRIPTION - DESCRIPTORS
DESCRIPTORS: ACHING
DESCRIPTORS: THROBBING
DESCRIPTORS: HEAVINESS
DESCRIPTORS: THROBBING
DESCRIPTORS: ACHING
DESCRIPTORS: ACHING
DESCRIPTORS: THROBBING;BURNING
DESCRIPTORS: POUNDING

## 2023-11-30 ASSESSMENT — PAIN DESCRIPTION - LOCATION
LOCATION: HAND
LOCATION: HAND;FINGER (COMMENT WHICH ONE)
LOCATION: FINGER (COMMENT WHICH ONE);HAND
LOCATION: HAND

## 2023-11-30 ASSESSMENT — PAIN SCALES - GENERAL
PAINLEVEL_OUTOF10: 7
PAINLEVEL_OUTOF10: 8
PAINLEVEL_OUTOF10: 7
PAINLEVEL_OUTOF10: 8
PAINLEVEL_OUTOF10: 7
PAINLEVEL_OUTOF10: 6

## 2023-11-30 ASSESSMENT — PAIN - FUNCTIONAL ASSESSMENT
PAIN_FUNCTIONAL_ASSESSMENT: PREVENTS OR INTERFERES WITH MANY ACTIVE NOT PASSIVE ACTIVITIES
PAIN_FUNCTIONAL_ASSESSMENT: PREVENTS OR INTERFERES WITH MANY ACTIVE NOT PASSIVE ACTIVITIES

## 2023-11-30 ASSESSMENT — PAIN DESCRIPTION - ORIENTATION
ORIENTATION: LEFT

## 2023-12-01 ENCOUNTER — HOSPITAL ENCOUNTER (OUTPATIENT)
Dept: INTERVENTIONAL RADIOLOGY/VASCULAR | Age: 58
End: 2023-12-01
Payer: COMMERCIAL

## 2023-12-01 LAB
ANION GAP SERPL CALCULATED.3IONS-SCNC: 11 MEQ/L (ref 9–15)
BASOPHILS # BLD: 0.1 K/UL (ref 0–0.1)
BASOPHILS NFR BLD: 0.9 % (ref 0.2–1.2)
BUN SERPL-MCNC: 14 MG/DL (ref 6–20)
CALCIUM SERPL-MCNC: 9.4 MG/DL (ref 8.5–9.9)
CHLORIDE SERPL-SCNC: 102 MEQ/L (ref 95–107)
CO2 SERPL-SCNC: 24 MEQ/L (ref 20–31)
CREAT SERPL-MCNC: 0.63 MG/DL (ref 0.7–1.2)
EOSINOPHIL # BLD: 0.5 K/UL (ref 0–0.5)
EOSINOPHIL NFR BLD: 5.5 % (ref 0.8–7)
ERYTHROCYTE [DISTWIDTH] IN BLOOD BY AUTOMATED COUNT: 11.9 % (ref 11.6–14.4)
GLUCOSE BLD-MCNC: 123 MG/DL (ref 70–99)
GLUCOSE BLD-MCNC: 141 MG/DL (ref 70–99)
GLUCOSE BLD-MCNC: 142 MG/DL (ref 70–99)
GLUCOSE SERPL-MCNC: 114 MG/DL (ref 70–99)
HCT VFR BLD AUTO: 41.1 % (ref 42–52)
HGB BLD-MCNC: 13.9 G/DL (ref 13.7–17.5)
IMM GRANULOCYTES # BLD: 0 K/UL
IMM GRANULOCYTES NFR BLD: 0.4 %
INR PPP: 1
LYMPHOCYTES # BLD: 2.5 K/UL (ref 1.3–3.6)
LYMPHOCYTES NFR BLD: 27.5 %
MCH RBC QN AUTO: 30.3 PG (ref 25.7–32.2)
MCHC RBC AUTO-ENTMCNC: 33.8 % (ref 32.3–36.5)
MCV RBC AUTO: 89.7 FL (ref 79–92.2)
MONOCYTES # BLD: 0.7 K/UL (ref 0.3–0.8)
MONOCYTES NFR BLD: 7.9 % (ref 5.3–12.2)
NEUTROPHILS # BLD: 5.3 K/UL (ref 1.8–5.4)
NEUTS SEG NFR BLD: 57.8 % (ref 34–67.9)
PERFORMED ON: ABNORMAL
PLATELET # BLD AUTO: 234 K/UL (ref 163–337)
POTASSIUM SERPL-SCNC: 4.3 MEQ/L (ref 3.4–4.9)
PROTHROMBIN TIME: 13.9 SEC (ref 12.3–14.9)
RBC # BLD AUTO: 4.58 M/UL (ref 4.63–6.08)
SODIUM SERPL-SCNC: 137 MEQ/L (ref 135–144)
VANCOMYCIN SERPL-MCNC: 17.7 UG/ML (ref 10–40)
WBC # BLD AUTO: 9.1 K/UL (ref 4.2–9)

## 2023-12-01 PROCEDURE — 6370000000 HC RX 637 (ALT 250 FOR IP): Performed by: NURSE PRACTITIONER

## 2023-12-01 PROCEDURE — 1210000000 HC MED SURG R&B

## 2023-12-01 PROCEDURE — 85610 PROTHROMBIN TIME: CPT

## 2023-12-01 PROCEDURE — 2580000003 HC RX 258: Performed by: INTERNAL MEDICINE

## 2023-12-01 PROCEDURE — 2580000003 HC RX 258: Performed by: PHYSICIAN ASSISTANT

## 2023-12-01 PROCEDURE — 2580000003 HC RX 258: Performed by: NURSE PRACTITIONER

## 2023-12-01 PROCEDURE — 36415 COLL VENOUS BLD VENIPUNCTURE: CPT

## 2023-12-01 PROCEDURE — 6360000002 HC RX W HCPCS: Performed by: NURSE PRACTITIONER

## 2023-12-01 PROCEDURE — 36569 INSJ PICC 5 YR+ W/O IMAGING: CPT

## 2023-12-01 PROCEDURE — 85025 COMPLETE CBC W/AUTO DIFF WBC: CPT

## 2023-12-01 PROCEDURE — 6360000002 HC RX W HCPCS: Performed by: INTERNAL MEDICINE

## 2023-12-01 PROCEDURE — 02HV33Z INSERTION OF INFUSION DEVICE INTO SUPERIOR VENA CAVA, PERCUTANEOUS APPROACH: ICD-10-PCS | Performed by: INTERNAL MEDICINE

## 2023-12-01 PROCEDURE — C1769 GUIDE WIRE: HCPCS

## 2023-12-01 PROCEDURE — 36573 INSJ PICC RS&I 5 YR+: CPT

## 2023-12-01 PROCEDURE — 80202 ASSAY OF VANCOMYCIN: CPT

## 2023-12-01 PROCEDURE — 6360000002 HC RX W HCPCS: Performed by: PHYSICIAN ASSISTANT

## 2023-12-01 PROCEDURE — 36573 INSJ PICC RS&I 5 YR+: CPT | Performed by: RADIOLOGY

## 2023-12-01 PROCEDURE — 80048 BASIC METABOLIC PNL TOTAL CA: CPT

## 2023-12-01 PROCEDURE — 2500000003 HC RX 250 WO HCPCS: Performed by: NURSE PRACTITIONER

## 2023-12-01 PROCEDURE — 2500000003 HC RX 250 WO HCPCS: Performed by: INTERNAL MEDICINE

## 2023-12-01 RX ORDER — SODIUM CHLORIDE 0.9 % (FLUSH) 0.9 %
5-40 SYRINGE (ML) INJECTION PRN
Status: DISCONTINUED | OUTPATIENT
Start: 2023-12-01 | End: 2023-12-04 | Stop reason: HOSPADM

## 2023-12-01 RX ORDER — SODIUM CHLORIDE 9 MG/ML
250 INJECTION, SOLUTION INTRAVENOUS ONCE
Status: COMPLETED | OUTPATIENT
Start: 2023-12-01 | End: 2023-12-01

## 2023-12-01 RX ORDER — SODIUM CHLORIDE 0.9 % (FLUSH) 0.9 %
5-40 SYRINGE (ML) INJECTION EVERY 12 HOURS SCHEDULED
Status: DISCONTINUED | OUTPATIENT
Start: 2023-12-01 | End: 2023-12-04 | Stop reason: HOSPADM

## 2023-12-01 RX ORDER — LIDOCAINE HYDROCHLORIDE 20 MG/ML
5 INJECTION, SOLUTION INFILTRATION; PERINEURAL ONCE
Status: COMPLETED | OUTPATIENT
Start: 2023-12-01 | End: 2023-12-01

## 2023-12-01 RX ORDER — SODIUM CHLORIDE 9 MG/ML
INJECTION, SOLUTION INTRAVENOUS PRN
Status: DISCONTINUED | OUTPATIENT
Start: 2023-12-01 | End: 2023-12-04 | Stop reason: HOSPADM

## 2023-12-01 RX ADMIN — GABAPENTIN 300 MG: 300 CAPSULE ORAL at 09:05

## 2023-12-01 RX ADMIN — LIDOCAINE HYDROCHLORIDE 5 ML: 20 INJECTION, SOLUTION INFILTRATION; PERINEURAL at 13:53

## 2023-12-01 RX ADMIN — LISINOPRIL 20 MG: 20 TABLET ORAL at 09:05

## 2023-12-01 RX ADMIN — OXYCODONE HYDROCHLORIDE 5 MG: 5 TABLET ORAL at 01:45

## 2023-12-01 RX ADMIN — ACETAMINOPHEN 650 MG: 325 TABLET ORAL at 05:33

## 2023-12-01 RX ADMIN — HYDROMORPHONE HYDROCHLORIDE 0.5 MG: 1 INJECTION, SOLUTION INTRAMUSCULAR; INTRAVENOUS; SUBCUTANEOUS at 20:44

## 2023-12-01 RX ADMIN — VANCOMYCIN HYDROCHLORIDE 1250 MG: 1.25 INJECTION, POWDER, LYOPHILIZED, FOR SOLUTION INTRAVENOUS at 17:59

## 2023-12-01 RX ADMIN — OXYCODONE HYDROCHLORIDE 5 MG: 5 TABLET ORAL at 12:18

## 2023-12-01 RX ADMIN — HYDROMORPHONE HYDROCHLORIDE 0.5 MG: 1 INJECTION, SOLUTION INTRAMUSCULAR; INTRAVENOUS; SUBCUTANEOUS at 09:19

## 2023-12-01 RX ADMIN — GABAPENTIN 300 MG: 300 CAPSULE ORAL at 20:47

## 2023-12-01 RX ADMIN — OXYCODONE HYDROCHLORIDE 5 MG: 5 TABLET ORAL at 17:59

## 2023-12-01 RX ADMIN — HYDROMORPHONE HYDROCHLORIDE 0.5 MG: 1 INJECTION, SOLUTION INTRAMUSCULAR; INTRAVENOUS; SUBCUTANEOUS at 00:30

## 2023-12-01 RX ADMIN — PIPERACILLIN AND TAZOBACTAM 3375 MG: 3; .375 INJECTION, POWDER, LYOPHILIZED, FOR SOLUTION INTRAVENOUS at 05:27

## 2023-12-01 RX ADMIN — GABAPENTIN 300 MG: 300 CAPSULE ORAL at 15:37

## 2023-12-01 RX ADMIN — PIPERACILLIN AND TAZOBACTAM 3375 MG: 3; .375 INJECTION, POWDER, LYOPHILIZED, FOR SOLUTION INTRAVENOUS at 12:20

## 2023-12-01 RX ADMIN — Medication 10 ML: at 22:39

## 2023-12-01 RX ADMIN — DOCUSATE SODIUM 100 MG: 100 CAPSULE, LIQUID FILLED ORAL at 20:47

## 2023-12-01 RX ADMIN — Medication 10 ML: at 20:48

## 2023-12-01 RX ADMIN — SODIUM CHLORIDE 250 ML: 9 INJECTION, SOLUTION INTRAVENOUS at 13:53

## 2023-12-01 RX ADMIN — HYOSCYAMINE SULFATE: 16 SOLUTION at 09:06

## 2023-12-01 RX ADMIN — DOCUSATE SODIUM 100 MG: 100 CAPSULE, LIQUID FILLED ORAL at 09:05

## 2023-12-01 RX ADMIN — INSULIN GLARGINE 20 UNITS: 100 INJECTION, SOLUTION SUBCUTANEOUS at 22:38

## 2023-12-01 RX ADMIN — HYDROMORPHONE HYDROCHLORIDE 0.5 MG: 1 INJECTION, SOLUTION INTRAMUSCULAR; INTRAVENOUS; SUBCUTANEOUS at 15:37

## 2023-12-01 RX ADMIN — OXYCODONE HYDROCHLORIDE 5 MG: 5 TABLET ORAL at 05:33

## 2023-12-01 RX ADMIN — PIPERACILLIN AND TAZOBACTAM 3375 MG: 3; .375 INJECTION, POWDER, LYOPHILIZED, FOR SOLUTION INTRAVENOUS at 20:52

## 2023-12-01 RX ADMIN — VANCOMYCIN HYDROCHLORIDE 1000 MG: 1 INJECTION, POWDER, LYOPHILIZED, FOR SOLUTION INTRAVENOUS at 06:15

## 2023-12-01 ASSESSMENT — PAIN SCALES - GENERAL
PAINLEVEL_OUTOF10: 7
PAINLEVEL_OUTOF10: 8
PAINLEVEL_OUTOF10: 8
PAINLEVEL_OUTOF10: 6
PAINLEVEL_OUTOF10: 7
PAINLEVEL_OUTOF10: 7

## 2023-12-01 ASSESSMENT — PAIN DESCRIPTION - LOCATION
LOCATION: HAND

## 2023-12-01 ASSESSMENT — PAIN - FUNCTIONAL ASSESSMENT
PAIN_FUNCTIONAL_ASSESSMENT: PREVENTS OR INTERFERES SOME ACTIVE ACTIVITIES AND ADLS
PAIN_FUNCTIONAL_ASSESSMENT: PREVENTS OR INTERFERES SOME ACTIVE ACTIVITIES AND ADLS

## 2023-12-01 ASSESSMENT — PAIN DESCRIPTION - DESCRIPTORS
DESCRIPTORS: SHARP;THROBBING;BURNING
DESCRIPTORS: ACHING;BURNING
DESCRIPTORS: BURNING;ACHING
DESCRIPTORS: ACHING
DESCRIPTORS: ACHING

## 2023-12-01 ASSESSMENT — PAIN DESCRIPTION - ORIENTATION
ORIENTATION: LEFT

## 2023-12-01 NOTE — PLAN OF CARE
OK to d/c from ortho standpoint over the weekend if Okd by other care teams. If he is still in the hospital Monday, I will stop by in the morning to check on the wound. Please repack on Sunday if he is still in hospital.   Call with any signs of infection - worsening swelling, red streaks up arm, worsening F/C/NS. I doubt this will occur but if such does, he will need transfer to [facility]/ for further washout by hand surgeon.    [phone]

## 2023-12-02 LAB
BACTERIA BLD CULT ORG #2: NORMAL
BACTERIA BLD CULT: NORMAL
GLUCOSE BLD-MCNC: 115 MG/DL (ref 70–99)
GLUCOSE BLD-MCNC: 121 MG/DL (ref 70–99)
GLUCOSE BLD-MCNC: 135 MG/DL (ref 70–99)
GLUCOSE BLD-MCNC: 169 MG/DL (ref 70–99)
PERFORMED ON: ABNORMAL

## 2023-12-02 PROCEDURE — 2580000003 HC RX 258: Performed by: INTERNAL MEDICINE

## 2023-12-02 PROCEDURE — 2580000003 HC RX 258: Performed by: NURSE PRACTITIONER

## 2023-12-02 PROCEDURE — 6360000002 HC RX W HCPCS: Performed by: INTERNAL MEDICINE

## 2023-12-02 PROCEDURE — 6370000000 HC RX 637 (ALT 250 FOR IP): Performed by: NURSE PRACTITIONER

## 2023-12-02 PROCEDURE — 2500000003 HC RX 250 WO HCPCS: Performed by: NURSE PRACTITIONER

## 2023-12-02 PROCEDURE — 6360000002 HC RX W HCPCS: Performed by: NURSE PRACTITIONER

## 2023-12-02 PROCEDURE — 1210000000 HC MED SURG R&B

## 2023-12-02 PROCEDURE — 99232 SBSQ HOSP IP/OBS MODERATE 35: CPT | Performed by: INTERNAL MEDICINE

## 2023-12-02 RX ADMIN — Medication 10 ML: at 20:29

## 2023-12-02 RX ADMIN — LISINOPRIL 20 MG: 20 TABLET ORAL at 08:25

## 2023-12-02 RX ADMIN — GABAPENTIN 300 MG: 300 CAPSULE ORAL at 08:24

## 2023-12-02 RX ADMIN — VANCOMYCIN HYDROCHLORIDE 1250 MG: 1.25 INJECTION, POWDER, LYOPHILIZED, FOR SOLUTION INTRAVENOUS at 06:11

## 2023-12-02 RX ADMIN — OXYCODONE HYDROCHLORIDE 5 MG: 5 TABLET ORAL at 08:25

## 2023-12-02 RX ADMIN — INSULIN LISPRO 5 UNITS: 100 INJECTION, SOLUTION INTRAVENOUS; SUBCUTANEOUS at 13:10

## 2023-12-02 RX ADMIN — DOCUSATE SODIUM 100 MG: 100 CAPSULE, LIQUID FILLED ORAL at 08:24

## 2023-12-02 RX ADMIN — OXYCODONE HYDROCHLORIDE 5 MG: 5 TABLET ORAL at 20:25

## 2023-12-02 RX ADMIN — INSULIN GLARGINE 20 UNITS: 100 INJECTION, SOLUTION SUBCUTANEOUS at 20:27

## 2023-12-02 RX ADMIN — PIPERACILLIN AND TAZOBACTAM 3375 MG: 3; .375 INJECTION, POWDER, LYOPHILIZED, FOR SOLUTION INTRAVENOUS at 05:57

## 2023-12-02 RX ADMIN — GABAPENTIN 300 MG: 300 CAPSULE ORAL at 13:10

## 2023-12-02 RX ADMIN — OXYCODONE HYDROCHLORIDE 5 MG: 5 TABLET ORAL at 01:13

## 2023-12-02 RX ADMIN — HYDROMORPHONE HYDROCHLORIDE 0.5 MG: 1 INJECTION, SOLUTION INTRAMUSCULAR; INTRAVENOUS; SUBCUTANEOUS at 05:43

## 2023-12-02 RX ADMIN — HYDROMORPHONE HYDROCHLORIDE 0.5 MG: 1 INJECTION, SOLUTION INTRAMUSCULAR; INTRAVENOUS; SUBCUTANEOUS at 14:22

## 2023-12-02 RX ADMIN — DOCUSATE SODIUM 100 MG: 100 CAPSULE, LIQUID FILLED ORAL at 20:25

## 2023-12-02 RX ADMIN — INSULIN LISPRO 5 UNITS: 100 INJECTION, SOLUTION INTRAVENOUS; SUBCUTANEOUS at 08:25

## 2023-12-02 RX ADMIN — GABAPENTIN 300 MG: 300 CAPSULE ORAL at 20:25

## 2023-12-02 RX ADMIN — HYOSCYAMINE SULFATE: 16 SOLUTION at 08:49

## 2023-12-02 RX ADMIN — ERTAPENEM SODIUM 1000 MG: 1 INJECTION INTRAMUSCULAR; INTRAVENOUS at 14:26

## 2023-12-02 ASSESSMENT — PAIN SCALES - GENERAL
PAINLEVEL_OUTOF10: 5
PAINLEVEL_OUTOF10: 7
PAINLEVEL_OUTOF10: 6
PAINLEVEL_OUTOF10: 4
PAINLEVEL_OUTOF10: 5
PAINLEVEL_OUTOF10: 3
PAINLEVEL_OUTOF10: 5

## 2023-12-02 ASSESSMENT — PAIN DESCRIPTION - LOCATION
LOCATION: HAND

## 2023-12-02 ASSESSMENT — PAIN DESCRIPTION - ORIENTATION
ORIENTATION: LEFT

## 2023-12-02 ASSESSMENT — PAIN DESCRIPTION - DESCRIPTORS
DESCRIPTORS: ACHING;SHOOTING
DESCRIPTORS: ACHING;THROBBING
DESCRIPTORS: ACHING;THROBBING;SORE
DESCRIPTORS: BURNING;THROBBING

## 2023-12-02 ASSESSMENT — PAIN - FUNCTIONAL ASSESSMENT: PAIN_FUNCTIONAL_ASSESSMENT: PREVENTS OR INTERFERES SOME ACTIVE ACTIVITIES AND ADLS

## 2023-12-03 VITALS
TEMPERATURE: 97.3 F | RESPIRATION RATE: 18 BRPM | BODY MASS INDEX: 25.14 KG/M2 | HEART RATE: 55 BPM | WEIGHT: 179.56 LBS | HEIGHT: 71 IN | DIASTOLIC BLOOD PRESSURE: 70 MMHG | OXYGEN SATURATION: 100 % | SYSTOLIC BLOOD PRESSURE: 145 MMHG

## 2023-12-03 LAB
CULTURE WOUND: ABNORMAL
CULTURE WOUND: ABNORMAL
GLUCOSE BLD-MCNC: 141 MG/DL (ref 70–99)
ORGANISM: ABNORMAL
PERFORMED ON: ABNORMAL

## 2023-12-03 PROCEDURE — 2580000003 HC RX 258: Performed by: NURSE PRACTITIONER

## 2023-12-03 PROCEDURE — 6370000000 HC RX 637 (ALT 250 FOR IP): Performed by: NURSE PRACTITIONER

## 2023-12-03 PROCEDURE — 2500000003 HC RX 250 WO HCPCS: Performed by: NURSE PRACTITIONER

## 2023-12-03 PROCEDURE — 2580000003 HC RX 258: Performed by: INTERNAL MEDICINE

## 2023-12-03 PROCEDURE — 6360000002 HC RX W HCPCS: Performed by: INTERNAL MEDICINE

## 2023-12-03 RX ORDER — OXYCODONE HYDROCHLORIDE 5 MG/1
5 TABLET ORAL EVERY 4 HOURS PRN
Qty: 12 TABLET | Refills: 0 | Status: SHIPPED | OUTPATIENT
Start: 2023-12-03 | End: 2023-12-06

## 2023-12-03 RX ORDER — INSULIN GLARGINE 100 [IU]/ML
20 INJECTION, SOLUTION SUBCUTANEOUS NIGHTLY
Qty: 5 ADJUSTABLE DOSE PRE-FILLED PEN SYRINGE | Refills: 3 | Status: SHIPPED | OUTPATIENT
Start: 2023-12-03

## 2023-12-03 RX ORDER — LISINOPRIL 20 MG/1
20 TABLET ORAL DAILY
Qty: 30 TABLET | Refills: 5 | Status: SHIPPED | OUTPATIENT
Start: 2023-12-03

## 2023-12-03 RX ORDER — GABAPENTIN 300 MG/1
300 CAPSULE ORAL 3 TIMES DAILY
Qty: 90 CAPSULE | Refills: 0 | Status: SHIPPED | OUTPATIENT
Start: 2023-12-03 | End: 2024-01-02

## 2023-12-03 RX ORDER — NICOTINE 21 MG/24HR
1 PATCH, TRANSDERMAL 24 HOURS TRANSDERMAL DAILY
Qty: 30 PATCH | Refills: 3 | Status: SHIPPED | OUTPATIENT
Start: 2023-12-03

## 2023-12-03 RX ADMIN — OXYCODONE HYDROCHLORIDE 5 MG: 5 TABLET ORAL at 06:35

## 2023-12-03 RX ADMIN — ERTAPENEM SODIUM 1000 MG: 1 INJECTION INTRAMUSCULAR; INTRAVENOUS at 09:26

## 2023-12-03 RX ADMIN — HYDROMORPHONE HYDROCHLORIDE 0.5 MG: 1 INJECTION, SOLUTION INTRAMUSCULAR; INTRAVENOUS; SUBCUTANEOUS at 09:29

## 2023-12-03 RX ADMIN — HYOSCYAMINE SULFATE: 16 SOLUTION at 09:33

## 2023-12-03 RX ADMIN — Medication 10 ML: at 09:33

## 2023-12-03 RX ADMIN — DOCUSATE SODIUM 100 MG: 100 CAPSULE, LIQUID FILLED ORAL at 09:28

## 2023-12-03 RX ADMIN — INSULIN LISPRO 5 UNITS: 100 INJECTION, SOLUTION INTRAVENOUS; SUBCUTANEOUS at 09:28

## 2023-12-03 RX ADMIN — GABAPENTIN 300 MG: 300 CAPSULE ORAL at 09:28

## 2023-12-03 RX ADMIN — LISINOPRIL 20 MG: 20 TABLET ORAL at 09:28

## 2023-12-03 RX ADMIN — Medication 10 ML: at 09:30

## 2023-12-03 RX ADMIN — OXYCODONE HYDROCHLORIDE 5 MG: 5 TABLET ORAL at 01:14

## 2023-12-03 ASSESSMENT — PAIN DESCRIPTION - ORIENTATION
ORIENTATION: LEFT
ORIENTATION: LEFT

## 2023-12-03 ASSESSMENT — PAIN DESCRIPTION - LOCATION
LOCATION: HAND
LOCATION: HAND

## 2023-12-03 ASSESSMENT — PAIN - FUNCTIONAL ASSESSMENT
PAIN_FUNCTIONAL_ASSESSMENT: PREVENTS OR INTERFERES SOME ACTIVE ACTIVITIES AND ADLS
PAIN_FUNCTIONAL_ASSESSMENT: ACTIVITIES ARE NOT PREVENTED

## 2023-12-03 ASSESSMENT — PAIN DESCRIPTION - DESCRIPTORS
DESCRIPTORS: BURNING;THROBBING
DESCRIPTORS: SHARP

## 2023-12-03 ASSESSMENT — PAIN SCALES - GENERAL
PAINLEVEL_OUTOF10: 4
PAINLEVEL_OUTOF10: 8

## 2023-12-03 NOTE — DISCHARGE INSTR - COC
Concerns:403984183}    Impairments/Disabilities:      [address] [place] [address] Impairments/Disabilities:840056960}    Nutrition Therapy:  Current Nutrition Therapy:   [address] [place] [address] Diet List:279804348}    Routes of Feeding: {CHP DME Other Feedings:983616708}  Liquids: {Slp liquid thickness:44675}  Daily Fluid Restriction: {CHP DME Yes amt example:415709946}  Last Modified Barium Swallow with Video (Video Swallowing Test): {Done Not Done VKGX:657854461}    Treatments at the Time of Hospital Discharge:   Respiratory Treatments: ***  Oxygen Therapy:  {Therapy; copd oxygen:04232}  Ventilator:    {MH CC Vent FPGQ:867520657}    Rehab Therapies: {THERAPEUTIC INTERVENTION:2115908254}  Weight Bearing Status/Restrictions: [address] [place] [address] Weight Bearin}  Other Medical Equipment (for information only, NOT a DME order):  {EQUIPMENT:594966819}  Other Treatments: ***    Patient's personal belongings (please select all that are sent with patient):  {CHP DME Belongings:097601009}    RN SIGNATURE:  {Esignature:570088427}    CASE MANAGEMENT/SOCIAL WORK SECTION    Inpatient Status Date: ***    Readmission Risk Assessment Score:  Readmission Risk              Risk of Unplanned Readmission:  7           Discharging to Facility/ Agency   Name:   Address:  Phone:  Fax:    Dialysis Facility (if applicable)   Name:  Address:  Dialysis Schedule:  Phone:  Fax:    / signature: {Esignature:379649741}    PHYSICIAN SECTION    Prognosis: {Prognosis:1360229682}    Condition at Discharge: [address] [place] [address] Patient Condition:604135878}    Rehab Potential (if transferring to Rehab): {Prognosis:7391644066}    Recommended Labs or Other Treatments After Discharge: ***    Physician Certification: I certify the above information and transfer of Cristian Ogden  is necessary for the continuing treatment of the diagnosis listed and that he requires {Admit to Appropriate Level of Care:01100} for {GREATER/LESS:894811662} 30 days.      Update Admission H&P: {CHP DME

## 2023-12-03 NOTE — DISCHARGE SUMMARY
Discharge Summary    Patient:  Andre Louis  YOB: 1965    MRN: [id number]   Acct: [de-identified]    Primary Care Physician: Guanakito Krishnan MD    Admit date:  11/27/2023    Discharge date:   12/03/23      Discharge Diagnoses:   Osteomyelitis [facility])  Principal Problem:    Osteomyelitis ([address])  Active Problems:    Cellulitis of left hand    Acute osteomyelitis of toe, right ([address])    Type 2 diabetes mellitus with hyperglycemia, without long-term current use of insulin (HCC)    Abscess of left hand    Abscess of finger, left    Cellulitis of left foot  Resolved Problems:    * No resolved hospital problems. *      Admitted for: [facility] Course: patient was admitted and treated for R great toe/L hand abscess/infection, underwent debridement/abscess drainage per ortho/podiatry. Wound culture was grow Methicillin sensitive Staphylococcus aureus infection of hand and  Enterobacter infection of toe. Was evaluated by ID and after receiving PICC line will be DC home for outpatient ertapenem infusion x 2 weeks. Due to persistent non controlled DM initiated lantus with good glycemic control. Consultants:  ID/podiatry/ortho     Discharge Medications:       Medication List        START taking these medications      ertapenem  infusion  Commonly known as: INVanz  Infuse 1,000 mg intravenously every 24 hours for 14 days Compound per protocol. Lantus SoloStar 100 UNIT/ML injection pen  Generic drug: insulin glargine  Inject 20 Units into the skin nightly     nicotine 21 MG/24HR  Commonly known as: NICODERM CQ  Place 1 patch onto the skin daily     oxyCODONE 5 MG immediate release tablet  Commonly known as: ROXICODONE  Take 1 tablet by mouth every 4 hours as needed for Pain for up to 3 days. Max Daily Amount: 30 mg            CONTINUE taking these medications      blood glucose test strips strip  Commonly known as: ASCENSIA AUTODISC VI;ONE TOUCH ULTRA TEST VI  Test once daily as directed.

## 2023-12-03 NOTE — DISCHARGE INSTRUCTIONS
Antibiotic SDT (Same Day Therapy): Stop at registration first, to get orders and wrist band. Come to second floor to nurses desk. Someone will show you to a room to do your antibiotic. We will assess PICC line dressing and do labs. Once you are finished with the antibiotic, you can go. Disclaimer: Sometimes wait times may be longer than other days. The antibiotic infuse time is 30 min, but we still need to get things set up. So place accordingly. The first day will definitely be the longest.     Right Great Toe Dressing: Wet a small piece of gauze with Dakins. Squeeze out all excess dakin fluid. Apply Dakins wet gauze to nail bed. (Make sure to only apply it to affected area). May place large band-aide over area or wrap toe. Whole foot does not need to be wrapped. This dressing change will occur daily. Left Hand Dressing: Leave in place until follow up on 12/6/2023. PICC Line: Please refrain from showering or soaking the dressing. If bleeding occurs, apply pressure and elevate. If the bleeding does not stop, go to the ER. Watch for signs and symptoms of infection (redness, pain, warm to the touch).

## 2023-12-04 ENCOUNTER — HOSPITAL ENCOUNTER (OUTPATIENT)
Dept: INFUSION THERAPY | Age: 58
Setting detail: INFUSION SERIES
Discharge: HOME | End: 2023-12-04
Payer: COMMERCIAL

## 2023-12-04 DIAGNOSIS — M86.00 ACUTE HEMATOGENOUS OSTEOMYELITIS, UNSPECIFIED SITE (HCC): Primary | ICD-10-CM

## 2023-12-04 DIAGNOSIS — M86.171 ACUTE OSTEOMYELITIS OF TOE, RIGHT (HCC): ICD-10-CM

## 2023-12-04 LAB
ANION GAP SERPL CALCULATED.3IONS-SCNC: 11 MEQ/L (ref 9–15)
BASOPHILS # BLD: 0.1 K/UL (ref 0–0.2)
BASOPHILS NFR BLD: 0.8 %
BUN SERPL-MCNC: 24 MG/DL (ref 6–20)
CALCIUM SERPL-MCNC: 9.1 MG/DL (ref 8.5–9.9)
CHLORIDE SERPL-SCNC: 102 MEQ/L (ref 95–107)
CO2 SERPL-SCNC: 26 MEQ/L (ref 20–31)
CREAT SERPL-MCNC: 0.84 MG/DL (ref 0.7–1.2)
EOSINOPHIL # BLD: 0.6 K/UL (ref 0–0.7)
EOSINOPHIL NFR BLD: 3.8 %
ERYTHROCYTE [DISTWIDTH] IN BLOOD BY AUTOMATED COUNT: 12.1 % (ref 11.5–14.5)
GLUCOSE SERPL-MCNC: 178 MG/DL (ref 70–99)
HCT VFR BLD AUTO: 43.9 % (ref 42–52)
HGB BLD-MCNC: 14.4 G/DL (ref 14–18)
LYMPHOCYTES # BLD: 4.5 K/UL (ref 1–4.8)
LYMPHOCYTES NFR BLD: 30.7 %
MCH RBC QN AUTO: 30.1 PG (ref 27–31.3)
MCHC RBC AUTO-ENTMCNC: 32.8 % (ref 33–37)
MCV RBC AUTO: 91.6 FL (ref 79–92.2)
MONOCYTES # BLD: 0.9 K/UL (ref 0.2–0.8)
MONOCYTES NFR BLD: 6.5 %
NEUTROPHILS # BLD: 8.4 K/UL (ref 1.4–6.5)
NEUTS SEG NFR BLD: 57.8 %
PLATELET # BLD AUTO: 202 K/UL (ref 130–400)
POTASSIUM SERPL-SCNC: 4.6 MEQ/L (ref 3.4–4.9)
RBC # BLD AUTO: 4.79 M/UL (ref 4.7–6.1)
SODIUM SERPL-SCNC: 139 MEQ/L (ref 135–144)
WBC # BLD AUTO: 14.6 K/UL (ref 4.8–10.8)

## 2023-12-04 PROCEDURE — 96365 THER/PROPH/DIAG IV INF INIT: CPT

## 2023-12-04 PROCEDURE — 36592 COLLECT BLOOD FROM PICC: CPT

## 2023-12-04 PROCEDURE — 85025 COMPLETE CBC W/AUTO DIFF WBC: CPT

## 2023-12-04 PROCEDURE — 2580000003 HC RX 258: Performed by: INTERNAL MEDICINE

## 2023-12-04 PROCEDURE — 6360000002 HC RX W HCPCS: Performed by: INTERNAL MEDICINE

## 2023-12-04 PROCEDURE — 80048 BASIC METABOLIC PNL TOTAL CA: CPT

## 2023-12-04 RX ADMIN — ERTAPENEM SODIUM 1000 MG: 1 INJECTION INTRAMUSCULAR; INTRAVENOUS at 10:37

## 2023-12-05 ENCOUNTER — HOSPITAL ENCOUNTER (OUTPATIENT)
Dept: INFUSION THERAPY | Age: 58
Setting detail: INFUSION SERIES
Discharge: HOME | End: 2023-12-05
Payer: COMMERCIAL

## 2023-12-05 DIAGNOSIS — M86.171 ACUTE OSTEOMYELITIS OF TOE, RIGHT (HCC): ICD-10-CM

## 2023-12-05 DIAGNOSIS — M86.00 ACUTE HEMATOGENOUS OSTEOMYELITIS, UNSPECIFIED SITE (HCC): Primary | ICD-10-CM

## 2023-12-05 LAB
CULTURE SURGICAL: ABNORMAL
CULTURE SURGICAL: ABNORMAL
ORGANISM: ABNORMAL

## 2023-12-05 PROCEDURE — 96365 THER/PROPH/DIAG IV INF INIT: CPT

## 2023-12-05 PROCEDURE — 6360000002 HC RX W HCPCS: Performed by: INTERNAL MEDICINE

## 2023-12-05 PROCEDURE — 2580000003 HC RX 258: Performed by: INTERNAL MEDICINE

## 2023-12-05 RX ADMIN — ERTAPENEM SODIUM 1000 MG: 1 INJECTION INTRAMUSCULAR; INTRAVENOUS at 10:05

## 2023-12-05 NOTE — PROGRESS NOTES
Pt here for same day. IV ATBX complete. When this nurse went in to flush line I noticed PICC dressing was coming off from top off dressing. Insertion site almost exposed. Per pt , its been coming off and wife had been putting tape on it. This nurse removed dressing using sterile technique and applied new dressing using sterile technique. No s/sx of infection noted. Pt also given sleeve to cover PICC. Pt made aware that if dressing starts to come off again to let nursing staff know so that it can immediately be replaced. Pt thankful. Off floor via self.

## 2023-12-06 ENCOUNTER — HOSPITAL ENCOUNTER (OUTPATIENT)
Dept: INFUSION THERAPY | Age: 58
Setting detail: INFUSION SERIES
Discharge: HOME | End: 2023-12-06
Payer: COMMERCIAL

## 2023-12-06 ENCOUNTER — OFFICE VISIT (OUTPATIENT)
Dept: ORTHOPEDIC SURGERY | Age: 58
End: 2023-12-06

## 2023-12-06 VITALS
BODY MASS INDEX: 25.06 KG/M2 | HEART RATE: 99 BPM | WEIGHT: 179 LBS | OXYGEN SATURATION: 99 % | HEIGHT: 71 IN | TEMPERATURE: 98.6 F

## 2023-12-06 VITALS — SYSTOLIC BLOOD PRESSURE: 145 MMHG | DIASTOLIC BLOOD PRESSURE: 75 MMHG

## 2023-12-06 DIAGNOSIS — M86.171 ACUTE OSTEOMYELITIS OF TOE, RIGHT (HCC): Primary | ICD-10-CM

## 2023-12-06 DIAGNOSIS — L03.114 CELLULITIS OF LEFT HAND: Primary | ICD-10-CM

## 2023-12-06 DIAGNOSIS — E11.9 TYPE 2 DIABETES MELLITUS WITHOUT COMPLICATION, WITHOUT LONG-TERM CURRENT USE OF INSULIN (HCC): ICD-10-CM

## 2023-12-06 DIAGNOSIS — M86.00 ACUTE HEMATOGENOUS OSTEOMYELITIS, UNSPECIFIED SITE (HCC): ICD-10-CM

## 2023-12-06 PROCEDURE — 99024 POSTOP FOLLOW-UP VISIT: CPT | Performed by: PHYSICIAN ASSISTANT

## 2023-12-06 PROCEDURE — 2580000003 HC RX 258: Performed by: INTERNAL MEDICINE

## 2023-12-06 PROCEDURE — 6360000002 HC RX W HCPCS: Performed by: INTERNAL MEDICINE

## 2023-12-06 PROCEDURE — 96365 THER/PROPH/DIAG IV INF INIT: CPT

## 2023-12-06 RX ORDER — OXYCODONE HYDROCHLORIDE 5 MG/1
5 TABLET ORAL EVERY 8 HOURS PRN
Qty: 15 TABLET | Refills: 0 | Status: SHIPPED | OUTPATIENT
Start: 2023-12-06 | End: 2023-12-11

## 2023-12-06 RX ADMIN — ERTAPENEM SODIUM 1000 MG: 1 INJECTION INTRAMUSCULAR; INTRAVENOUS at 12:34

## 2023-12-06 NOTE — PROGRESS NOTES
Housing in the Last Year: No     Number of Places Lived in the Last Year: 1     Unstable Housing in the Last Year: No     Family History   Problem Relation Age of Onset    High Blood Pressure Mother      No Known Allergies  Current Outpatient Medications on File Prior to Visit   Medication Sig Dispense Refill    oxyCODONE (ROXICODONE) 5 MG immediate release tablet Take 1 tablet by mouth every 4 hours as needed for Pain for up to 3 days. Max Daily Amount: 30 mg 12 tablet 0    lisinopril (PRINIVIL;ZESTRIL) 20 MG tablet Take 1 tablet by mouth daily 30 tablet 5    nicotine (NICODERM CQ) 21 MG/24HR Place 1 patch onto the skin daily 30 patch 3    insulin glargine (LANTUS SOLOSTAR) 100 UNIT/ML injection pen Inject 20 Units into the skin nightly 5 Adjustable Dose Pre-filled Pen Syringe 3    blood glucose test strips (ASCENSIA AUTODISC VI;ONE TOUCH ULTRA TEST VI) strip Test once daily as directed. 50 strip 3    SOFT TOUCH LANCETS MISC 1 each by Does not apply route daily 100 each 3    gabapentin (NEURONTIN) 300 MG capsule Take 1 capsule by mouth 3 times daily for 30 days. Start at night 90 capsule 0    ertapenem (INVANZ) infusion Infuse 1,000 mg intravenously every 24 hours for 14 days Compound per protocol. 14 g 0     No current facility-administered medications on file prior to visit. Objective: There were no vitals taken for this visit. Radiographs and Laboratory Studies:   Previous diagnostic imaging studies were reviewed. C&S:    Staphylococcus aureus - BACTERIAL SUSCEPTIBILITY PANEL BY LEANDRO        benzylpenicillin >=0.5 Resistant mcg/mL       clindamycin <=0.25 Sensitive mcg/mL       erythromycin <=0.25 Sensitive mcg/mL       gentamicin* <=0.5 Sensitive mcg/mL        * Gentamicin is used only in combination with other active agents that  test susceptible.           levofloxacin 0.25 Sensitive mcg/mL       oxacillin 0.5 Sensitive mcg/mL       tetracycline <=1 Sensitive mcg/mL

## 2023-12-07 ENCOUNTER — HOSPITAL ENCOUNTER (OUTPATIENT)
Dept: INFUSION THERAPY | Age: 58
Setting detail: INFUSION SERIES
Discharge: HOME | End: 2023-12-07
Payer: COMMERCIAL

## 2023-12-07 VITALS
TEMPERATURE: 97.9 F | OXYGEN SATURATION: 99 % | SYSTOLIC BLOOD PRESSURE: 155 MMHG | DIASTOLIC BLOOD PRESSURE: 78 MMHG | HEART RATE: 70 BPM

## 2023-12-07 DIAGNOSIS — M86.00 ACUTE HEMATOGENOUS OSTEOMYELITIS, UNSPECIFIED SITE (HCC): Primary | ICD-10-CM

## 2023-12-07 DIAGNOSIS — M86.171 ACUTE OSTEOMYELITIS OF TOE, RIGHT (HCC): ICD-10-CM

## 2023-12-07 PROCEDURE — 96365 THER/PROPH/DIAG IV INF INIT: CPT

## 2023-12-07 PROCEDURE — 2580000003 HC RX 258: Performed by: INTERNAL MEDICINE

## 2023-12-07 PROCEDURE — 6360000002 HC RX W HCPCS: Performed by: INTERNAL MEDICINE

## 2023-12-07 RX ADMIN — ERTAPENEM SODIUM 1000 MG: 1 INJECTION INTRAMUSCULAR; INTRAVENOUS at 10:16

## 2023-12-08 ENCOUNTER — HOSPITAL ENCOUNTER (OUTPATIENT)
Dept: INFUSION THERAPY | Age: 58
Setting detail: INFUSION SERIES
Discharge: HOME | End: 2023-12-08
Payer: COMMERCIAL

## 2023-12-08 VITALS
DIASTOLIC BLOOD PRESSURE: 77 MMHG | OXYGEN SATURATION: 99 % | TEMPERATURE: 97.8 F | HEART RATE: 72 BPM | SYSTOLIC BLOOD PRESSURE: 122 MMHG | RESPIRATION RATE: 20 BRPM

## 2023-12-08 DIAGNOSIS — M86.171 ACUTE OSTEOMYELITIS OF TOE, RIGHT (HCC): Primary | ICD-10-CM

## 2023-12-08 DIAGNOSIS — M86.00 ACUTE HEMATOGENOUS OSTEOMYELITIS, UNSPECIFIED SITE (HCC): ICD-10-CM

## 2023-12-08 PROCEDURE — 6360000002 HC RX W HCPCS: Performed by: INTERNAL MEDICINE

## 2023-12-08 PROCEDURE — 2580000003 HC RX 258: Performed by: INTERNAL MEDICINE

## 2023-12-08 PROCEDURE — 96365 THER/PROPH/DIAG IV INF INIT: CPT

## 2023-12-08 RX ADMIN — ERTAPENEM SODIUM 1000 MG: 1 INJECTION INTRAMUSCULAR; INTRAVENOUS at 10:58

## 2023-12-09 ENCOUNTER — HOSPITAL ENCOUNTER (OUTPATIENT)
Dept: INFUSION THERAPY | Age: 58
Setting detail: INFUSION SERIES
Discharge: HOME | End: 2023-12-09
Payer: COMMERCIAL

## 2023-12-09 DIAGNOSIS — M86.171 ACUTE OSTEOMYELITIS OF TOE, RIGHT (HCC): ICD-10-CM

## 2023-12-09 DIAGNOSIS — M86.00 ACUTE HEMATOGENOUS OSTEOMYELITIS, UNSPECIFIED SITE (HCC): Primary | ICD-10-CM

## 2023-12-09 PROCEDURE — 96365 THER/PROPH/DIAG IV INF INIT: CPT

## 2023-12-09 PROCEDURE — 2580000003 HC RX 258: Performed by: INTERNAL MEDICINE

## 2023-12-09 PROCEDURE — 6360000002 HC RX W HCPCS: Performed by: INTERNAL MEDICINE

## 2023-12-09 RX ADMIN — ERTAPENEM SODIUM 1000 MG: 1 INJECTION INTRAMUSCULAR; INTRAVENOUS at 10:39

## 2023-12-10 ENCOUNTER — HOSPITAL ENCOUNTER (OUTPATIENT)
Dept: INFUSION THERAPY | Age: 58
Setting detail: INFUSION SERIES
Discharge: HOME | End: 2023-12-10
Payer: COMMERCIAL

## 2023-12-10 VITALS
TEMPERATURE: 98.1 F | DIASTOLIC BLOOD PRESSURE: 77 MMHG | RESPIRATION RATE: 20 BRPM | HEART RATE: 59 BPM | SYSTOLIC BLOOD PRESSURE: 128 MMHG

## 2023-12-10 DIAGNOSIS — M86.171 ACUTE OSTEOMYELITIS OF TOE, RIGHT (HCC): ICD-10-CM

## 2023-12-10 DIAGNOSIS — M86.00 ACUTE HEMATOGENOUS OSTEOMYELITIS, UNSPECIFIED SITE (HCC): Primary | ICD-10-CM

## 2023-12-10 PROCEDURE — 96365 THER/PROPH/DIAG IV INF INIT: CPT

## 2023-12-10 PROCEDURE — 2580000003 HC RX 258: Performed by: INTERNAL MEDICINE

## 2023-12-10 PROCEDURE — 6360000002 HC RX W HCPCS: Performed by: INTERNAL MEDICINE

## 2023-12-10 RX ADMIN — ERTAPENEM SODIUM 1000 MG: 1 INJECTION INTRAMUSCULAR; INTRAVENOUS at 10:54

## 2023-12-10 ASSESSMENT — PAIN SCALES - GENERAL: PAINLEVEL_OUTOF10: 0

## 2023-12-11 ENCOUNTER — HOSPITAL ENCOUNTER (OUTPATIENT)
Dept: INFUSION THERAPY | Age: 58
Setting detail: INFUSION SERIES
Discharge: HOME | End: 2023-12-11
Payer: COMMERCIAL

## 2023-12-11 VITALS
DIASTOLIC BLOOD PRESSURE: 73 MMHG | SYSTOLIC BLOOD PRESSURE: 153 MMHG | TEMPERATURE: 97.5 F | RESPIRATION RATE: 16 BRPM | HEART RATE: 74 BPM | OXYGEN SATURATION: 97 %

## 2023-12-11 DIAGNOSIS — M86.00 ACUTE HEMATOGENOUS OSTEOMYELITIS, UNSPECIFIED SITE (HCC): ICD-10-CM

## 2023-12-11 DIAGNOSIS — M86.171 ACUTE OSTEOMYELITIS OF TOE, RIGHT (HCC): Primary | ICD-10-CM

## 2023-12-11 PROCEDURE — 2580000003 HC RX 258: Performed by: INTERNAL MEDICINE

## 2023-12-11 PROCEDURE — 6360000002 HC RX W HCPCS: Performed by: INTERNAL MEDICINE

## 2023-12-11 PROCEDURE — 96365 THER/PROPH/DIAG IV INF INIT: CPT

## 2023-12-11 RX ADMIN — ERTAPENEM SODIUM 1000 MG: 1 INJECTION INTRAMUSCULAR; INTRAVENOUS at 10:20

## 2023-12-12 ENCOUNTER — HOSPITAL ENCOUNTER (OUTPATIENT)
Dept: INFUSION THERAPY | Age: 58
Setting detail: INFUSION SERIES
Discharge: HOME | End: 2023-12-12
Payer: COMMERCIAL

## 2023-12-12 DIAGNOSIS — M86.171 ACUTE OSTEOMYELITIS OF TOE, RIGHT (HCC): ICD-10-CM

## 2023-12-12 DIAGNOSIS — M86.00 ACUTE HEMATOGENOUS OSTEOMYELITIS, UNSPECIFIED SITE (HCC): Primary | ICD-10-CM

## 2023-12-12 LAB
ANION GAP SERPL CALCULATED.3IONS-SCNC: 12 MEQ/L (ref 9–15)
BASOPHILS # BLD: 0.1 K/UL (ref 0–0.2)
BASOPHILS NFR BLD: 1 %
BUN SERPL-MCNC: 15 MG/DL (ref 6–20)
C-REACTIVE PROTEIN, HIGH SENSITIVITY: 40.8 MG/L (ref 0–5)
CALCIUM SERPL-MCNC: 9.2 MG/DL (ref 8.5–9.9)
CHLORIDE SERPL-SCNC: 95 MEQ/L (ref 95–107)
CO2 SERPL-SCNC: 26 MEQ/L (ref 20–31)
CREAT SERPL-MCNC: 0.6 MG/DL (ref 0.7–1.2)
EOSINOPHIL # BLD: 0.5 K/UL (ref 0–0.7)
EOSINOPHIL NFR BLD: 3.7 %
ERYTHROCYTE [DISTWIDTH] IN BLOOD BY AUTOMATED COUNT: 12 % (ref 11.5–14.5)
GLUCOSE SERPL-MCNC: 248 MG/DL (ref 70–99)
HCT VFR BLD AUTO: 41.2 % (ref 42–52)
HGB BLD-MCNC: 14 G/DL (ref 14–18)
LYMPHOCYTES # BLD: 2.8 K/UL (ref 1–4.8)
LYMPHOCYTES NFR BLD: 22.9 %
MCH RBC QN AUTO: 30.5 PG (ref 27–31.3)
MCHC RBC AUTO-ENTMCNC: 34 % (ref 33–37)
MCV RBC AUTO: 89.8 FL (ref 79–92.2)
MONOCYTES # BLD: 0.8 K/UL (ref 0.2–0.8)
MONOCYTES NFR BLD: 6.6 %
NEUTROPHILS # BLD: 8 K/UL (ref 1.4–6.5)
NEUTS SEG NFR BLD: 65.6 %
PLATELET # BLD AUTO: 217 K/UL (ref 130–400)
POTASSIUM SERPL-SCNC: 4.4 MEQ/L (ref 3.4–4.9)
RBC # BLD AUTO: 4.59 M/UL (ref 4.7–6.1)
SODIUM SERPL-SCNC: 133 MEQ/L (ref 135–144)
WBC # BLD AUTO: 12.2 K/UL (ref 4.8–10.8)

## 2023-12-12 PROCEDURE — 86141 C-REACTIVE PROTEIN HS: CPT

## 2023-12-12 PROCEDURE — 85025 COMPLETE CBC W/AUTO DIFF WBC: CPT

## 2023-12-12 PROCEDURE — 80048 BASIC METABOLIC PNL TOTAL CA: CPT

## 2023-12-12 PROCEDURE — 96365 THER/PROPH/DIAG IV INF INIT: CPT

## 2023-12-12 PROCEDURE — 6360000002 HC RX W HCPCS: Performed by: INTERNAL MEDICINE

## 2023-12-12 PROCEDURE — 2580000003 HC RX 258: Performed by: INTERNAL MEDICINE

## 2023-12-12 RX ADMIN — ERTAPENEM SODIUM 1000 MG: 1 INJECTION INTRAMUSCULAR; INTRAVENOUS at 10:12

## 2023-12-13 ENCOUNTER — HOSPITAL ENCOUNTER (OUTPATIENT)
Dept: INFUSION THERAPY | Age: 58
Setting detail: INFUSION SERIES
Discharge: HOME | End: 2023-12-13
Payer: COMMERCIAL

## 2023-12-13 DIAGNOSIS — M86.171 ACUTE OSTEOMYELITIS OF TOE, RIGHT (HCC): Primary | ICD-10-CM

## 2023-12-13 DIAGNOSIS — M86.00 ACUTE HEMATOGENOUS OSTEOMYELITIS, UNSPECIFIED SITE (HCC): ICD-10-CM

## 2023-12-13 PROCEDURE — 2580000003 HC RX 258: Performed by: INTERNAL MEDICINE

## 2023-12-13 PROCEDURE — 96365 THER/PROPH/DIAG IV INF INIT: CPT

## 2023-12-13 PROCEDURE — 6360000002 HC RX W HCPCS: Performed by: INTERNAL MEDICINE

## 2023-12-13 RX ADMIN — ERTAPENEM SODIUM 1000 MG: 1 INJECTION INTRAMUSCULAR; INTRAVENOUS at 10:49

## 2023-12-14 ENCOUNTER — HOSPITAL ENCOUNTER (OUTPATIENT)
Dept: INFUSION THERAPY | Age: 58
Setting detail: INFUSION SERIES
Discharge: HOME | End: 2023-12-14
Payer: COMMERCIAL

## 2023-12-14 DIAGNOSIS — M86.00 ACUTE HEMATOGENOUS OSTEOMYELITIS, UNSPECIFIED SITE (HCC): ICD-10-CM

## 2023-12-14 DIAGNOSIS — M86.171 ACUTE OSTEOMYELITIS OF TOE, RIGHT (HCC): Primary | ICD-10-CM

## 2023-12-14 PROCEDURE — 2580000003 HC RX 258: Performed by: INTERNAL MEDICINE

## 2023-12-14 PROCEDURE — 6360000002 HC RX W HCPCS: Performed by: INTERNAL MEDICINE

## 2023-12-14 PROCEDURE — 96365 THER/PROPH/DIAG IV INF INIT: CPT

## 2023-12-14 RX ADMIN — ERTAPENEM SODIUM 1000 MG: 1 INJECTION INTRAMUSCULAR; INTRAVENOUS at 10:52

## 2023-12-15 ENCOUNTER — HOSPITAL ENCOUNTER (OUTPATIENT)
Dept: INFUSION THERAPY | Age: 58
Setting detail: INFUSION SERIES
Discharge: HOME | End: 2023-12-15
Payer: COMMERCIAL

## 2023-12-15 VITALS
OXYGEN SATURATION: 98 % | TEMPERATURE: 98.2 F | SYSTOLIC BLOOD PRESSURE: 132 MMHG | DIASTOLIC BLOOD PRESSURE: 74 MMHG | RESPIRATION RATE: 16 BRPM | HEART RATE: 75 BPM

## 2023-12-15 DIAGNOSIS — M86.171 ACUTE OSTEOMYELITIS OF TOE, RIGHT (HCC): Primary | ICD-10-CM

## 2023-12-15 DIAGNOSIS — M86.00 ACUTE HEMATOGENOUS OSTEOMYELITIS, UNSPECIFIED SITE (HCC): ICD-10-CM

## 2023-12-15 PROCEDURE — 96365 THER/PROPH/DIAG IV INF INIT: CPT

## 2023-12-15 PROCEDURE — 2580000003 HC RX 258: Performed by: INTERNAL MEDICINE

## 2023-12-15 PROCEDURE — 6360000002 HC RX W HCPCS: Performed by: INTERNAL MEDICINE

## 2023-12-15 RX ADMIN — SODIUM CHLORIDE 1000 MG: 900 INJECTION INTRAVENOUS at 10:00

## 2023-12-16 ENCOUNTER — HOSPITAL ENCOUNTER (OUTPATIENT)
Dept: INFUSION THERAPY | Age: 58
Setting detail: INFUSION SERIES
Discharge: HOME | End: 2023-12-16
Payer: COMMERCIAL

## 2023-12-16 VITALS
HEART RATE: 100 BPM | SYSTOLIC BLOOD PRESSURE: 152 MMHG | TEMPERATURE: 97.2 F | DIASTOLIC BLOOD PRESSURE: 87 MMHG | OXYGEN SATURATION: 100 % | RESPIRATION RATE: 16 BRPM

## 2023-12-16 DIAGNOSIS — M86.171 ACUTE OSTEOMYELITIS OF TOE, RIGHT (HCC): Primary | ICD-10-CM

## 2023-12-16 DIAGNOSIS — M86.00 ACUTE HEMATOGENOUS OSTEOMYELITIS, UNSPECIFIED SITE (HCC): ICD-10-CM

## 2023-12-16 PROCEDURE — 2580000003 HC RX 258: Performed by: INTERNAL MEDICINE

## 2023-12-16 PROCEDURE — 6360000002 HC RX W HCPCS: Performed by: INTERNAL MEDICINE

## 2023-12-16 RX ADMIN — ERTAPENEM SODIUM 1000 MG: 1 INJECTION INTRAMUSCULAR; INTRAVENOUS at 10:06

## 2023-12-16 ASSESSMENT — PAIN SCALES - GENERAL: PAINLEVEL_OUTOF10: 0

## 2023-12-21 ENCOUNTER — HOSPITAL ENCOUNTER (OUTPATIENT)
Dept: INFUSION THERAPY | Age: 58
Setting detail: INFUSION SERIES
Discharge: HOME | End: 2023-12-21

## 2023-12-21 VITALS
RESPIRATION RATE: 18 BRPM | HEART RATE: 77 BPM | OXYGEN SATURATION: 100 % | SYSTOLIC BLOOD PRESSURE: 146 MMHG | TEMPERATURE: 97.3 F | DIASTOLIC BLOOD PRESSURE: 72 MMHG

## 2023-12-21 PROBLEM — F19.10 DRUG ABUSE (HCC): Status: ACTIVE | Noted: 2023-12-21

## 2023-12-21 NOTE — CARE COORDINATION
Order for PICC removal was faxed to med/surg, from Infectious Disease office. Central scheduling was notified to put patient on today's schedule. Registration aware.

## 2024-01-26 ENCOUNTER — OFFICE VISIT (OUTPATIENT)
Dept: FAMILY MEDICINE CLINIC | Age: 59
End: 2024-01-26
Payer: COMMERCIAL

## 2024-01-26 VITALS
SYSTOLIC BLOOD PRESSURE: 152 MMHG | HEART RATE: 75 BPM | BODY MASS INDEX: 26.68 KG/M2 | DIASTOLIC BLOOD PRESSURE: 80 MMHG | OXYGEN SATURATION: 95 % | WEIGHT: 190.6 LBS | HEIGHT: 71 IN

## 2024-01-26 DIAGNOSIS — E11.9 TYPE 2 DIABETES MELLITUS WITHOUT COMPLICATION, WITHOUT LONG-TERM CURRENT USE OF INSULIN (HCC): ICD-10-CM

## 2024-01-26 DIAGNOSIS — I10 ESSENTIAL HYPERTENSION: ICD-10-CM

## 2024-01-26 DIAGNOSIS — Z12.12 SCREENING FOR COLORECTAL CANCER: ICD-10-CM

## 2024-01-26 DIAGNOSIS — Z12.11 SCREENING FOR COLORECTAL CANCER: ICD-10-CM

## 2024-01-26 DIAGNOSIS — M47.817 LUMBOSACRAL SPONDYLOSIS WITHOUT MYELOPATHY: Primary | ICD-10-CM

## 2024-01-26 LAB — HBA1C MFR BLD: 9.1 %

## 2024-01-26 PROCEDURE — G8484 FLU IMMUNIZE NO ADMIN: HCPCS | Performed by: NURSE PRACTITIONER

## 2024-01-26 PROCEDURE — 3077F SYST BP >= 140 MM HG: CPT | Performed by: NURSE PRACTITIONER

## 2024-01-26 PROCEDURE — 4004F PT TOBACCO SCREEN RCVD TLK: CPT | Performed by: NURSE PRACTITIONER

## 2024-01-26 PROCEDURE — 3078F DIAST BP <80 MM HG: CPT | Performed by: NURSE PRACTITIONER

## 2024-01-26 PROCEDURE — 3046F HEMOGLOBIN A1C LEVEL >9.0%: CPT | Performed by: NURSE PRACTITIONER

## 2024-01-26 PROCEDURE — G8419 CALC BMI OUT NRM PARAM NOF/U: HCPCS | Performed by: NURSE PRACTITIONER

## 2024-01-26 PROCEDURE — 2022F DILAT RTA XM EVC RTNOPTHY: CPT | Performed by: NURSE PRACTITIONER

## 2024-01-26 PROCEDURE — 83036 HEMOGLOBIN GLYCOSYLATED A1C: CPT | Performed by: NURSE PRACTITIONER

## 2024-01-26 PROCEDURE — G8427 DOCREV CUR MEDS BY ELIG CLIN: HCPCS | Performed by: NURSE PRACTITIONER

## 2024-01-26 PROCEDURE — 3017F COLORECTAL CA SCREEN DOC REV: CPT | Performed by: NURSE PRACTITIONER

## 2024-01-26 PROCEDURE — 99203 OFFICE O/P NEW LOW 30 MIN: CPT | Performed by: NURSE PRACTITIONER

## 2024-01-26 RX ORDER — CEPHALEXIN 500 MG/1
500 CAPSULE ORAL 3 TIMES DAILY
COMMUNITY

## 2024-01-26 RX ORDER — PEN NEEDLE, DIABETIC 31 G X1/4"
NEEDLE, DISPOSABLE MISCELLANEOUS
COMMUNITY
Start: 2023-12-03

## 2024-01-26 RX ORDER — LISINOPRIL AND HYDROCHLOROTHIAZIDE 25; 20 MG/1; MG/1
1 TABLET ORAL DAILY
Qty: 30 TABLET | Refills: 0 | Status: SHIPPED | OUTPATIENT
Start: 2024-01-26

## 2024-01-26 RX ORDER — SULFAMETHOXAZOLE AND TRIMETHOPRIM 800; 160 MG/1; MG/1
TABLET ORAL
COMMUNITY
Start: 2021-02-12

## 2024-01-26 RX ORDER — BLOOD-GLUCOSE METER
EACH MISCELLANEOUS
COMMUNITY
Start: 2023-12-03

## 2024-01-26 RX ORDER — LISINOPRIL AND HYDROCHLOROTHIAZIDE 12.5; 1 MG/1; MG/1
TABLET ORAL
COMMUNITY
Start: 2020-11-02

## 2024-01-26 RX ORDER — LISINOPRIL 20 MG/1
20 TABLET ORAL DAILY
Qty: 30 TABLET | Refills: 5 | Status: CANCELLED | OUTPATIENT
Start: 2024-01-26

## 2024-01-26 RX ORDER — GABAPENTIN 300 MG/1
300 CAPSULE ORAL 3 TIMES DAILY
Qty: 90 CAPSULE | Refills: 0 | Status: SHIPPED | OUTPATIENT
Start: 2024-01-26 | End: 2024-02-25

## 2024-01-26 SDOH — ECONOMIC STABILITY: HOUSING INSECURITY
IN THE LAST 12 MONTHS, WAS THERE A TIME WHEN YOU DID NOT HAVE A STEADY PLACE TO SLEEP OR SLEPT IN A SHELTER (INCLUDING NOW)?: NO

## 2024-01-26 SDOH — ECONOMIC STABILITY: INCOME INSECURITY: HOW HARD IS IT FOR YOU TO PAY FOR THE VERY BASICS LIKE FOOD, HOUSING, MEDICAL CARE, AND HEATING?: NOT HARD AT ALL

## 2024-01-26 SDOH — ECONOMIC STABILITY: FOOD INSECURITY: WITHIN THE PAST 12 MONTHS, YOU WORRIED THAT YOUR FOOD WOULD RUN OUT BEFORE YOU GOT MONEY TO BUY MORE.: NEVER TRUE

## 2024-01-26 SDOH — ECONOMIC STABILITY: FOOD INSECURITY: WITHIN THE PAST 12 MONTHS, THE FOOD YOU BOUGHT JUST DIDN'T LAST AND YOU DIDN'T HAVE MONEY TO GET MORE.: NEVER TRUE

## 2024-01-26 ASSESSMENT — PATIENT HEALTH QUESTIONNAIRE - PHQ9
1. LITTLE INTEREST OR PLEASURE IN DOING THINGS: 0
SUM OF ALL RESPONSES TO PHQ QUESTIONS 1-9: 0
SUM OF ALL RESPONSES TO PHQ9 QUESTIONS 1 & 2: 0
SUM OF ALL RESPONSES TO PHQ QUESTIONS 1-9: 0
2. FEELING DOWN, DEPRESSED OR HOPELESS: 0
SUM OF ALL RESPONSES TO PHQ QUESTIONS 1-9: 0
SUM OF ALL RESPONSES TO PHQ QUESTIONS 1-9: 0

## 2024-01-26 NOTE — PROGRESS NOTES
Subjective  Tee Jj, 58 y.o. male presents today with:  Chief Complaint   Patient presents with    Establish Care     Last PCP- Dr.Yun-Lai Ontiveros last seen in about 6-7 yrs   Specialist- podiatrist/endocrinologist/Sofya Walker MD  Infectious Diseases  Labs- 12/2023   Colonoscopy- Never denies family HX     Health Maintenance     Colonoscopy- never pt would like to discuss this/ Cologuard with PCP.   Flu vaccine- declined   Discussed- COVID/ shingles immunizations Pharmacy       Cough     Onset- a couple weeks   Headache- N  Body aches- N  Ear ache-N  Runny/stuffy nose- N  Problem with smell- N  Problem with taste- N  Sore throat- Y  Cough- Y  Scratchy throat-Y  Chest symptoms- Y  SOB/ CHENG- Y   Hx of Asthma Chest cold or bronchitis- N  Fever/chills- Y chills   Nausea/ vomiting- Vomiting last week   Gi symptoms-N  COVID exposures- N   Treatments- N       I reviewed staff HPI/chief complaint and do agree with above    Patient presents today to establish care past medical history of hypertension, lumbar sacral spondylosis without myelopathy.  Patient was also newly diagnosed type 2 diabetes, osteomyelitis of the right upper extremity PICC line in place receiving ertapenem 8 days status post incision and drainage of the left hand presenting to the emergency department for hand surgery consultation.  Has followed with podiatrist, endocrinologist and infectious disease with recent diagnosis.    Last A1C 9.2%, does admit to a very unhealthy diet, was drinking 6-8 pepsi per day, has cut back to 1 per day.  Has been attempting to follow a diabetic diet.  Does continue on 20 units of Lantus nightly.  Does admit to not checking his sugars regularly.  Not currently taking any oral medications.    Blood pressures continue to be elevated, he denies any shortness of breath/troubles breathing, chest pain, headaches, vision changes, lower extremity edema, dizziness.  Does have a significant history of tobacco use.  Denies

## 2024-01-29 ENCOUNTER — TELEPHONE (OUTPATIENT)
Dept: FAMILY MEDICINE CLINIC | Age: 59
End: 2024-01-29

## 2024-01-29 RX ORDER — ALBUTEROL SULFATE 90 UG/1
2 AEROSOL, METERED RESPIRATORY (INHALATION) EVERY 6 HOURS PRN
Qty: 1 EACH | Refills: 0 | Status: SHIPPED | OUTPATIENT
Start: 2024-01-29

## 2024-01-29 NOTE — TELEPHONE ENCOUNTER
Pt calling for a refill on his inhaler, name of inhaler is ventilin please send to [facility]

## 2024-01-29 NOTE — TELEPHONE ENCOUNTER
Comments: called pt, states that this was given to him years ago? Notes that he has inhaler still, taht is how he was able to give name!     Last Office Visit (last PCP visit):   1/26/2024    Next Visit Date:  Future Appointments   Date Time Provider Department Center   2/23/2024  4:30 PM Geovanni Ford, GEN - CNP [facility]       **If hasn't been seen in over a year OR hasn't followed up according to last diabetes/ADHD visit, make appointment for patient before sending refill to provider.    Rx requested:  Requested Prescriptions      No prescriptions requested or ordered in this encounter

## 2024-02-01 ASSESSMENT — ENCOUNTER SYMPTOMS
ABDOMINAL PAIN: 0
SORE THROAT: 1
BLOOD IN STOOL: 0
DIARRHEA: 0
NAUSEA: 0
SINUS PAIN: 0
SHORTNESS OF BREATH: 1
RHINORRHEA: 0
SINUS PRESSURE: 0
CHEST TIGHTNESS: 0
VOMITING: 0
TROUBLE SWALLOWING: 0
COUGH: 1
CONSTIPATION: 0

## 2024-03-12 ENCOUNTER — TELEPHONE (OUTPATIENT)
Dept: FAMILY MEDICINE CLINIC | Age: 59
End: 2024-03-12

## 2024-03-12 ENCOUNTER — OFFICE VISIT (OUTPATIENT)
Dept: FAMILY MEDICINE CLINIC | Age: 59
End: 2024-03-12
Payer: COMMERCIAL

## 2024-03-12 VITALS
DIASTOLIC BLOOD PRESSURE: 82 MMHG | OXYGEN SATURATION: 96 % | WEIGHT: 181.8 LBS | HEIGHT: 71 IN | HEART RATE: 80 BPM | SYSTOLIC BLOOD PRESSURE: 136 MMHG | BODY MASS INDEX: 25.45 KG/M2

## 2024-03-12 DIAGNOSIS — E11.9 TYPE 2 DIABETES MELLITUS WITHOUT COMPLICATION, WITHOUT LONG-TERM CURRENT USE OF INSULIN (HCC): Primary | ICD-10-CM

## 2024-03-12 DIAGNOSIS — M86.9 OSTEOMYELITIS OF RIGHT FOOT, UNSPECIFIED TYPE (HCC): ICD-10-CM

## 2024-03-12 DIAGNOSIS — I10 ESSENTIAL HYPERTENSION: ICD-10-CM

## 2024-03-12 LAB — HBA1C MFR BLD: 8.8 %

## 2024-03-12 PROCEDURE — 2022F DILAT RTA XM EVC RTNOPTHY: CPT | Performed by: NURSE PRACTITIONER

## 2024-03-12 PROCEDURE — 99213 OFFICE O/P EST LOW 20 MIN: CPT | Performed by: NURSE PRACTITIONER

## 2024-03-12 PROCEDURE — 83036 HEMOGLOBIN GLYCOSYLATED A1C: CPT | Performed by: NURSE PRACTITIONER

## 2024-03-12 PROCEDURE — G8484 FLU IMMUNIZE NO ADMIN: HCPCS | Performed by: NURSE PRACTITIONER

## 2024-03-12 PROCEDURE — 4004F PT TOBACCO SCREEN RCVD TLK: CPT | Performed by: NURSE PRACTITIONER

## 2024-03-12 PROCEDURE — 3017F COLORECTAL CA SCREEN DOC REV: CPT | Performed by: NURSE PRACTITIONER

## 2024-03-12 PROCEDURE — 3075F SYST BP GE 130 - 139MM HG: CPT | Performed by: NURSE PRACTITIONER

## 2024-03-12 PROCEDURE — 3052F HG A1C>EQUAL 8.0%<EQUAL 9.0%: CPT | Performed by: NURSE PRACTITIONER

## 2024-03-12 PROCEDURE — G8419 CALC BMI OUT NRM PARAM NOF/U: HCPCS | Performed by: NURSE PRACTITIONER

## 2024-03-12 PROCEDURE — 3078F DIAST BP <80 MM HG: CPT | Performed by: NURSE PRACTITIONER

## 2024-03-12 PROCEDURE — G8427 DOCREV CUR MEDS BY ELIG CLIN: HCPCS | Performed by: NURSE PRACTITIONER

## 2024-03-12 RX ORDER — LISINOPRIL AND HYDROCHLOROTHIAZIDE 25; 20 MG/1; MG/1
1 TABLET ORAL DAILY
Qty: 90 TABLET | Refills: 1 | Status: SHIPPED | OUTPATIENT
Start: 2024-03-12 | End: 2024-09-08

## 2024-03-12 RX ORDER — INSULIN GLARGINE 100 [IU]/ML
20 INJECTION, SOLUTION SUBCUTANEOUS NIGHTLY
Qty: 5 ADJUSTABLE DOSE PRE-FILLED PEN SYRINGE | Refills: 5 | Status: SHIPPED | OUTPATIENT
Start: 2024-03-12

## 2024-03-12 RX ORDER — METFORMIN HYDROCHLORIDE 500 MG/1
1000 TABLET, EXTENDED RELEASE ORAL
Qty: 60 TABLET | Refills: 1 | Status: SHIPPED | OUTPATIENT
Start: 2024-03-12

## 2024-03-12 RX ORDER — DOXYCYCLINE HYCLATE 100 MG
100 TABLET ORAL 2 TIMES DAILY
Qty: 14 TABLET | Refills: 0 | Status: SHIPPED | OUTPATIENT
Start: 2024-03-12 | End: 2024-03-19

## 2024-03-12 NOTE — PROGRESS NOTES
MG extended release tablet      2. Essential hypertension  lisinopril-hydroCHLOROthiazide (PRINZIDE;ZESTORETIC) 20-25 MG per tablet   Significantly improved today in office, continue on lisinopril/hydrochlorothiazide.  Continue with dietary and lifestyle modifications including following a DASH diet and increase physical activity/weight reduction   3. Osteomyelitis of right foot, unspecified type (HCC)     Resolved, discussed the importance of tight glucose control.  Continue to monitor for any wounds rashes or lesions to skin.     Orders Placed This Encounter   Procedures    POCT glycosylated hemoglobin (Hb A1C)     Orders Placed This Encounter   Medications    insulin glargine (LANTUS SOLOSTAR) 100 UNIT/ML injection pen     Sig: Inject 20 Units into the skin nightly     Dispense:  5 Adjustable Dose Pre-filled Pen Syringe     Refill:  5    lisinopril-hydroCHLOROthiazide (PRINZIDE;ZESTORETIC) 20-25 MG per tablet     Sig: Take 1 tablet by mouth daily     Dispense:  90 tablet     Refill:  1    doxycycline hyclate (VIBRA-TABS) 100 MG tablet     Sig: Take 1 tablet by mouth 2 times daily for 7 days     Dispense:  14 tablet     Refill:  0    metFORMIN (GLUCOPHAGE-XR) 500 MG extended release tablet     Sig: Take 2 tablets by mouth daily (with breakfast)     Dispense:  60 tablet     Refill:  1     Medications Discontinued During This Encounter   Medication Reason    cephALEXin (KEFLEX) 500 MG capsule Therapy completed    insulin glargine (LANTUS SOLOSTAR) 100 UNIT/ML injection pen REORDER    lisinopril-hydroCHLOROthiazide (PRINZIDE;ZESTORETIC) 20-25 MG per tablet REORDER     Return in about 6 weeks (around 4/23/2024).      Reviewed with the patient: current clinical status, medications, activities and diet.     Side effects, adverse effects of the medication prescribed today, as well as treatment plan/ rationale and result expectations have been discussed with the patient who expresses understanding and desires to

## 2024-03-18 PROBLEM — E11.65 TYPE 2 DIABETES MELLITUS WITH HYPERGLYCEMIA, WITHOUT LONG-TERM CURRENT USE OF INSULIN (HCC): Status: RESOLVED | Noted: 2023-11-28 | Resolved: 2024-03-18

## 2024-03-18 ASSESSMENT — ENCOUNTER SYMPTOMS
NAUSEA: 0
DIARRHEA: 0
COLOR CHANGE: 0
ABDOMINAL PAIN: 0
VOMITING: 0
COUGH: 0
CONSTIPATION: 0
CHEST TIGHTNESS: 0
SHORTNESS OF BREATH: 0
BLOOD IN STOOL: 0

## 2024-03-25 ENCOUNTER — HOSPITAL ENCOUNTER (INPATIENT)
Facility: HOSPITAL | Age: 59
LOS: 8 days | Discharge: HOME HEALTH | End: 2024-04-03
Attending: STUDENT IN AN ORGANIZED HEALTH CARE EDUCATION/TRAINING PROGRAM | Admitting: STUDENT IN AN ORGANIZED HEALTH CARE EDUCATION/TRAINING PROGRAM
Payer: COMMERCIAL

## 2024-03-25 ENCOUNTER — APPOINTMENT (OUTPATIENT)
Dept: RADIOLOGY | Facility: HOSPITAL | Age: 59
End: 2024-03-25
Payer: COMMERCIAL

## 2024-03-25 DIAGNOSIS — L97.509 DIABETIC FOOT ULCER ASSOCIATED WITH DIABETES MELLITUS DUE TO UNDERLYING CONDITION, UNSPECIFIED LATERALITY, UNSPECIFIED PART OF FOOT, UNSPECIFIED ULCER STAGE (MULTI): ICD-10-CM

## 2024-03-25 DIAGNOSIS — I73.9 PERIPHERAL VASCULAR DISEASE, UNSPECIFIED (CMS-HCC): ICD-10-CM

## 2024-03-25 DIAGNOSIS — E11.621 TYPE 2 DIABETES MELLITUS WITH FOOT ULCER, WITH LONG-TERM CURRENT USE OF INSULIN: ICD-10-CM

## 2024-03-25 DIAGNOSIS — F17.200 CURRENT SMOKER: ICD-10-CM

## 2024-03-25 DIAGNOSIS — L97.509 TYPE 2 DIABETES MELLITUS WITH FOOT ULCER, WITH LONG-TERM CURRENT USE OF INSULIN: ICD-10-CM

## 2024-03-25 DIAGNOSIS — Z79.4 TYPE 2 DIABETES MELLITUS WITH FOOT ULCER, WITH LONG-TERM CURRENT USE OF INSULIN: ICD-10-CM

## 2024-03-25 DIAGNOSIS — E08.621 DIABETIC FOOT ULCER ASSOCIATED WITH DIABETES MELLITUS DUE TO UNDERLYING CONDITION, UNSPECIFIED LATERALITY, UNSPECIFIED PART OF FOOT, UNSPECIFIED ULCER STAGE (MULTI): ICD-10-CM

## 2024-03-25 DIAGNOSIS — M86.9 OSTEOMYELITIS OF GREAT TOE (MULTI): Primary | ICD-10-CM

## 2024-03-25 LAB
ALBUMIN SERPL BCP-MCNC: 3.8 G/DL (ref 3.4–5)
ALP SERPL-CCNC: 92 U/L (ref 33–120)
ALT SERPL W P-5'-P-CCNC: 11 U/L (ref 10–52)
ANION GAP SERPL CALC-SCNC: 13 MMOL/L (ref 10–20)
AST SERPL W P-5'-P-CCNC: 10 U/L (ref 9–39)
BASOPHILS # BLD AUTO: 0.09 X10*3/UL (ref 0–0.1)
BASOPHILS NFR BLD AUTO: 1 %
BILIRUB SERPL-MCNC: 0.6 MG/DL (ref 0–1.2)
BUN SERPL-MCNC: 20 MG/DL (ref 6–23)
CALCIUM SERPL-MCNC: 9.6 MG/DL (ref 8.6–10.3)
CHLORIDE SERPL-SCNC: 93 MMOL/L (ref 98–107)
CO2 SERPL-SCNC: 26 MMOL/L (ref 21–32)
CREAT SERPL-MCNC: 0.92 MG/DL (ref 0.5–1.3)
CRP SERPL-MCNC: 2.1 MG/DL
EGFRCR SERPLBLD CKD-EPI 2021: >90 ML/MIN/1.73M*2
EOSINOPHIL # BLD AUTO: 0.11 X10*3/UL (ref 0–0.7)
EOSINOPHIL NFR BLD AUTO: 1.3 %
ERYTHROCYTE [DISTWIDTH] IN BLOOD BY AUTOMATED COUNT: 13.4 % (ref 11.5–14.5)
EST. AVERAGE GLUCOSE BLD GHB EST-MCNC: 209 MG/DL
GLUCOSE BLD MANUAL STRIP-MCNC: 195 MG/DL (ref 74–99)
GLUCOSE BLD MANUAL STRIP-MCNC: 195 MG/DL (ref 74–99)
GLUCOSE BLD MANUAL STRIP-MCNC: 293 MG/DL (ref 74–99)
GLUCOSE SERPL-MCNC: 279 MG/DL (ref 74–99)
HBA1C MFR BLD: 8.9 %
HCT VFR BLD AUTO: 42.9 % (ref 41–52)
HGB BLD-MCNC: 14.2 G/DL (ref 13.5–17.5)
HOLD SPECIMEN: NORMAL
IMM GRANULOCYTES # BLD AUTO: 0.02 X10*3/UL (ref 0–0.7)
IMM GRANULOCYTES NFR BLD AUTO: 0.2 % (ref 0–0.9)
LACTATE SERPL-SCNC: 1.3 MMOL/L (ref 0.4–2)
LACTATE SERPL-SCNC: 3 MMOL/L (ref 0.4–2)
LYMPHOCYTES # BLD AUTO: 1.08 X10*3/UL (ref 1.2–4.8)
LYMPHOCYTES NFR BLD AUTO: 12.6 %
MCH RBC QN AUTO: 28.9 PG (ref 26–34)
MCHC RBC AUTO-ENTMCNC: 33.1 G/DL (ref 32–36)
MCV RBC AUTO: 87 FL (ref 80–100)
MONOCYTES # BLD AUTO: 0.44 X10*3/UL (ref 0.1–1)
MONOCYTES NFR BLD AUTO: 5.1 %
NEUTROPHILS # BLD AUTO: 6.86 X10*3/UL (ref 1.2–7.7)
NEUTROPHILS NFR BLD AUTO: 79.8 %
NRBC BLD-RTO: 0 /100 WBCS (ref 0–0)
PLATELET # BLD AUTO: 280 X10*3/UL (ref 150–450)
POTASSIUM SERPL-SCNC: 3.8 MMOL/L (ref 3.5–5.3)
PROT SERPL-MCNC: 9 G/DL (ref 6.4–8.2)
RBC # BLD AUTO: 4.92 X10*6/UL (ref 4.5–5.9)
SODIUM SERPL-SCNC: 128 MMOL/L (ref 136–145)
TSH SERPL-ACNC: 1.16 MIU/L (ref 0.44–3.98)
VANCOMYCIN SERPL-MCNC: 17.6 UG/ML (ref 5–20)
WBC # BLD AUTO: 8.6 X10*3/UL (ref 4.4–11.3)

## 2024-03-25 PROCEDURE — S4991 NICOTINE PATCH NONLEGEND: HCPCS | Performed by: INTERNAL MEDICINE

## 2024-03-25 PROCEDURE — 99285 EMERGENCY DEPT VISIT HI MDM: CPT | Mod: 25

## 2024-03-25 PROCEDURE — 2500000002 HC RX 250 W HCPCS SELF ADMINISTERED DRUGS (ALT 637 FOR MEDICARE OP, ALT 636 FOR OP/ED): Performed by: INTERNAL MEDICINE

## 2024-03-25 PROCEDURE — 96375 TX/PRO/DX INJ NEW DRUG ADDON: CPT

## 2024-03-25 PROCEDURE — 80053 COMPREHEN METABOLIC PANEL: CPT | Performed by: PHYSICIAN ASSISTANT

## 2024-03-25 PROCEDURE — 36415 COLL VENOUS BLD VENIPUNCTURE: CPT | Performed by: PHYSICIAN ASSISTANT

## 2024-03-25 PROCEDURE — G0378 HOSPITAL OBSERVATION PER HR: HCPCS

## 2024-03-25 PROCEDURE — 84443 ASSAY THYROID STIM HORMONE: CPT | Performed by: INTERNAL MEDICINE

## 2024-03-25 PROCEDURE — 87185 SC STD ENZYME DETCJ PER NZM: CPT | Mod: ELYLAB | Performed by: PHYSICIAN ASSISTANT

## 2024-03-25 PROCEDURE — 2550000001 HC RX 255 CONTRASTS: Performed by: INTERNAL MEDICINE

## 2024-03-25 PROCEDURE — 2500000004 HC RX 250 GENERAL PHARMACY W/ HCPCS (ALT 636 FOR OP/ED)

## 2024-03-25 PROCEDURE — 73660 X-RAY EXAM OF TOE(S): CPT | Mod: RIGHT SIDE | Performed by: RADIOLOGY

## 2024-03-25 PROCEDURE — 82947 ASSAY GLUCOSE BLOOD QUANT: CPT

## 2024-03-25 PROCEDURE — 73660 X-RAY EXAM OF TOE(S): CPT | Mod: RT

## 2024-03-25 PROCEDURE — 2500000001 HC RX 250 WO HCPCS SELF ADMINISTERED DRUGS (ALT 637 FOR MEDICARE OP): Performed by: INTERNAL MEDICINE

## 2024-03-25 PROCEDURE — 73720 MRI LWR EXTREMITY W/O&W/DYE: CPT | Mod: RT

## 2024-03-25 PROCEDURE — 86140 C-REACTIVE PROTEIN: CPT | Performed by: PHYSICIAN ASSISTANT

## 2024-03-25 PROCEDURE — 99223 1ST HOSP IP/OBS HIGH 75: CPT | Performed by: INTERNAL MEDICINE

## 2024-03-25 PROCEDURE — 2500000004 HC RX 250 GENERAL PHARMACY W/ HCPCS (ALT 636 FOR OP/ED): Performed by: PHYSICIAN ASSISTANT

## 2024-03-25 PROCEDURE — 87040 BLOOD CULTURE FOR BACTERIA: CPT | Mod: ELYLAB | Performed by: PHYSICIAN ASSISTANT

## 2024-03-25 PROCEDURE — 96361 HYDRATE IV INFUSION ADD-ON: CPT

## 2024-03-25 PROCEDURE — 83605 ASSAY OF LACTIC ACID: CPT | Performed by: PHYSICIAN ASSISTANT

## 2024-03-25 PROCEDURE — A9575 INJ GADOTERATE MEGLUMI 0.1ML: HCPCS | Performed by: INTERNAL MEDICINE

## 2024-03-25 PROCEDURE — 2500000004 HC RX 250 GENERAL PHARMACY W/ HCPCS (ALT 636 FOR OP/ED): Performed by: INTERNAL MEDICINE

## 2024-03-25 PROCEDURE — 85025 COMPLETE CBC W/AUTO DIFF WBC: CPT | Performed by: PHYSICIAN ASSISTANT

## 2024-03-25 PROCEDURE — 80202 ASSAY OF VANCOMYCIN: CPT

## 2024-03-25 PROCEDURE — 96365 THER/PROPH/DIAG IV INF INIT: CPT

## 2024-03-25 PROCEDURE — 83036 HEMOGLOBIN GLYCOSYLATED A1C: CPT | Mod: ELYLAB | Performed by: INTERNAL MEDICINE

## 2024-03-25 PROCEDURE — 93922 UPR/L XTREMITY ART 2 LEVELS: CPT

## 2024-03-25 PROCEDURE — 96367 TX/PROPH/DG ADDL SEQ IV INF: CPT

## 2024-03-25 PROCEDURE — 73719 MRI LOWER EXTREMITY W/DYE: CPT | Mod: RIGHT SIDE | Performed by: RADIOLOGY

## 2024-03-25 RX ORDER — GABAPENTIN 300 MG/1
300 CAPSULE ORAL 3 TIMES DAILY
Status: DISCONTINUED | OUTPATIENT
Start: 2024-03-25 | End: 2024-03-26

## 2024-03-25 RX ORDER — ALBUTEROL SULFATE 90 UG/1
2 INHALANT RESPIRATORY (INHALATION) EVERY 6 HOURS PRN
Status: DISCONTINUED | OUTPATIENT
Start: 2024-03-25 | End: 2024-04-03 | Stop reason: HOSPADM

## 2024-03-25 RX ORDER — ACETAMINOPHEN 325 MG/1
650 TABLET ORAL EVERY 4 HOURS PRN
Status: DISCONTINUED | OUTPATIENT
Start: 2024-03-25 | End: 2024-04-03 | Stop reason: HOSPADM

## 2024-03-25 RX ORDER — GABAPENTIN 300 MG/1
300 CAPSULE ORAL 3 TIMES DAILY
COMMUNITY
End: 2024-04-03 | Stop reason: HOSPADM

## 2024-03-25 RX ORDER — ONDANSETRON HYDROCHLORIDE 2 MG/ML
4 INJECTION, SOLUTION INTRAVENOUS ONCE
Status: COMPLETED | OUTPATIENT
Start: 2024-03-25 | End: 2024-03-25

## 2024-03-25 RX ORDER — ONDANSETRON HYDROCHLORIDE 2 MG/ML
4 INJECTION, SOLUTION INTRAVENOUS EVERY 8 HOURS PRN
Status: DISCONTINUED | OUTPATIENT
Start: 2024-03-25 | End: 2024-04-03 | Stop reason: HOSPADM

## 2024-03-25 RX ORDER — ACETAMINOPHEN 500 MG
5 TABLET ORAL NIGHTLY PRN
Status: DISCONTINUED | OUTPATIENT
Start: 2024-03-25 | End: 2024-04-03 | Stop reason: HOSPADM

## 2024-03-25 RX ORDER — VANCOMYCIN HYDROCHLORIDE 1 G/20ML
INJECTION, POWDER, LYOPHILIZED, FOR SOLUTION INTRAVENOUS DAILY PRN
Status: DISCONTINUED | OUTPATIENT
Start: 2024-03-25 | End: 2024-03-31

## 2024-03-25 RX ORDER — METFORMIN HYDROCHLORIDE 500 MG/1
500 TABLET ORAL
COMMUNITY

## 2024-03-25 RX ORDER — INSULIN LISPRO 100 [IU]/ML
0-10 INJECTION, SOLUTION INTRAVENOUS; SUBCUTANEOUS
Status: DISCONTINUED | OUTPATIENT
Start: 2024-03-25 | End: 2024-04-03 | Stop reason: HOSPADM

## 2024-03-25 RX ORDER — HYDROCODONE BITARTRATE AND ACETAMINOPHEN 5; 325 MG/1; MG/1
1 TABLET ORAL EVERY 6 HOURS PRN
Status: DISCONTINUED | OUTPATIENT
Start: 2024-03-25 | End: 2024-04-02

## 2024-03-25 RX ORDER — MORPHINE SULFATE 4 MG/ML
4 INJECTION, SOLUTION INTRAMUSCULAR; INTRAVENOUS ONCE
Status: COMPLETED | OUTPATIENT
Start: 2024-03-25 | End: 2024-03-25

## 2024-03-25 RX ORDER — GADOTERATE MEGLUMINE 376.9 MG/ML
0.2 INJECTION INTRAVENOUS
Status: COMPLETED | OUTPATIENT
Start: 2024-03-25 | End: 2024-03-25

## 2024-03-25 RX ORDER — LISINOPRIL AND HYDROCHLOROTHIAZIDE 20; 25 MG/1; MG/1
1 TABLET ORAL DAILY
COMMUNITY

## 2024-03-25 RX ORDER — POLYETHYLENE GLYCOL 3350 17 G/17G
17 POWDER, FOR SOLUTION ORAL DAILY PRN
Status: DISCONTINUED | OUTPATIENT
Start: 2024-03-25 | End: 2024-04-03 | Stop reason: HOSPADM

## 2024-03-25 RX ORDER — DEXTROSE 50 % IN WATER (D50W) INTRAVENOUS SYRINGE
12.5
Status: DISCONTINUED | OUTPATIENT
Start: 2024-03-25 | End: 2024-04-03 | Stop reason: HOSPADM

## 2024-03-25 RX ORDER — MORPHINE SULFATE 2 MG/ML
2 INJECTION, SOLUTION INTRAMUSCULAR; INTRAVENOUS EVERY 4 HOURS PRN
Status: DISCONTINUED | OUTPATIENT
Start: 2024-03-25 | End: 2024-03-31

## 2024-03-25 RX ORDER — VANCOMYCIN HYDROCHLORIDE 1 G/200ML
1 INJECTION, SOLUTION INTRAVENOUS EVERY 12 HOURS
Status: DISCONTINUED | OUTPATIENT
Start: 2024-03-26 | End: 2024-03-31

## 2024-03-25 RX ORDER — SODIUM CHLORIDE, SODIUM LACTATE, POTASSIUM CHLORIDE, CALCIUM CHLORIDE 600; 310; 30; 20 MG/100ML; MG/100ML; MG/100ML; MG/100ML
75 INJECTION, SOLUTION INTRAVENOUS CONTINUOUS
Status: ACTIVE | OUTPATIENT
Start: 2024-03-25 | End: 2024-03-26

## 2024-03-25 RX ORDER — INSULIN GLARGINE 100 [IU]/ML
15 INJECTION, SOLUTION SUBCUTANEOUS NIGHTLY
Status: DISCONTINUED | OUTPATIENT
Start: 2024-03-25 | End: 2024-04-03 | Stop reason: HOSPADM

## 2024-03-25 RX ORDER — INSULIN GLARGINE 100 [IU]/ML
20 INJECTION, SOLUTION SUBCUTANEOUS EVERY 24 HOURS
COMMUNITY

## 2024-03-25 RX ORDER — ONDANSETRON 4 MG/1
4 TABLET, FILM COATED ORAL EVERY 8 HOURS PRN
Status: DISCONTINUED | OUTPATIENT
Start: 2024-03-25 | End: 2024-04-03 | Stop reason: HOSPADM

## 2024-03-25 RX ORDER — IBUPROFEN 200 MG
1 TABLET ORAL DAILY
Status: DISCONTINUED | OUTPATIENT
Start: 2024-03-25 | End: 2024-04-03 | Stop reason: HOSPADM

## 2024-03-25 RX ORDER — LISINOPRIL 20 MG/1
20 TABLET ORAL DAILY
Status: DISCONTINUED | OUTPATIENT
Start: 2024-03-25 | End: 2024-04-03 | Stop reason: HOSPADM

## 2024-03-25 RX ORDER — DEXTROSE 50 % IN WATER (D50W) INTRAVENOUS SYRINGE
25
Status: DISCONTINUED | OUTPATIENT
Start: 2024-03-25 | End: 2024-04-03 | Stop reason: HOSPADM

## 2024-03-25 RX ORDER — TALC
3 POWDER (GRAM) TOPICAL DAILY
Status: DISCONTINUED | OUTPATIENT
Start: 2024-03-25 | End: 2024-03-25

## 2024-03-25 RX ADMIN — SODIUM CHLORIDE, PRESERVATIVE FREE 10 ML: 5 INJECTION INTRAVENOUS at 22:20

## 2024-03-25 RX ADMIN — SODIUM CHLORIDE 500 ML: 9 INJECTION, SOLUTION INTRAVENOUS at 13:23

## 2024-03-25 RX ADMIN — GABAPENTIN 300 MG: 300 CAPSULE ORAL at 22:07

## 2024-03-25 RX ADMIN — SODIUM CHLORIDE, POTASSIUM CHLORIDE, SODIUM LACTATE AND CALCIUM CHLORIDE 75 ML/HR: 600; 310; 30; 20 INJECTION, SOLUTION INTRAVENOUS at 17:37

## 2024-03-25 RX ADMIN — PIPERACILLIN AND TAZOBACTAM 4.5 G: 4; .5 INJECTION, POWDER, FOR SOLUTION INTRAVENOUS at 13:55

## 2024-03-25 RX ADMIN — HYDROCODONE BITARTRATE AND ACETAMINOPHEN 1 TABLET: 5; 325 TABLET ORAL at 18:17

## 2024-03-25 RX ADMIN — PIPERACILLIN SODIUM AND TAZOBACTAM SODIUM 3.38 G: 3; .375 INJECTION, SOLUTION INTRAVENOUS at 22:07

## 2024-03-25 RX ADMIN — MORPHINE SULFATE 4 MG: 4 INJECTION, SOLUTION INTRAMUSCULAR; INTRAVENOUS at 13:23

## 2024-03-25 RX ADMIN — VANCOMYCIN HYDROCHLORIDE 2 G: 5 INJECTION, POWDER, LYOPHILIZED, FOR SOLUTION INTRAVENOUS at 15:04

## 2024-03-25 RX ADMIN — MORPHINE SULFATE 2 MG: 2 INJECTION, SOLUTION INTRAMUSCULAR; INTRAVENOUS at 22:08

## 2024-03-25 RX ADMIN — NICOTINE 1 PATCH: 21 PATCH, EXTENDED RELEASE TRANSDERMAL at 17:37

## 2024-03-25 RX ADMIN — INSULIN LISPRO 2 UNITS: 100 INJECTION, SOLUTION INTRAVENOUS; SUBCUTANEOUS at 17:44

## 2024-03-25 RX ADMIN — INSULIN GLARGINE 15 UNITS: 100 INJECTION, SOLUTION SUBCUTANEOUS at 22:07

## 2024-03-25 RX ADMIN — GADOTERATE MEGLUMINE 15.5 ML: 376.9 INJECTION INTRAVENOUS at 21:38

## 2024-03-25 RX ADMIN — ONDANSETRON 4 MG: 2 INJECTION INTRAMUSCULAR; INTRAVENOUS at 13:23

## 2024-03-25 SDOH — SOCIAL STABILITY: SOCIAL INSECURITY: WERE YOU ABLE TO COMPLETE ALL THE BEHAVIORAL HEALTH SCREENINGS?: YES

## 2024-03-25 SDOH — SOCIAL STABILITY: SOCIAL INSECURITY: HAVE YOU HAD THOUGHTS OF HARMING ANYONE ELSE?: NO

## 2024-03-25 SDOH — SOCIAL STABILITY: SOCIAL INSECURITY: HAS ANYONE EVER THREATENED TO HURT YOUR FAMILY OR YOUR PETS?: NO

## 2024-03-25 SDOH — SOCIAL STABILITY: SOCIAL INSECURITY: DOES ANYONE TRY TO KEEP YOU FROM HAVING/CONTACTING OTHER FRIENDS OR DOING THINGS OUTSIDE YOUR HOME?: NO

## 2024-03-25 SDOH — SOCIAL STABILITY: SOCIAL INSECURITY: DO YOU FEEL ANYONE HAS EXPLOITED OR TAKEN ADVANTAGE OF YOU FINANCIALLY OR OF YOUR PERSONAL PROPERTY?: NO

## 2024-03-25 SDOH — SOCIAL STABILITY: SOCIAL INSECURITY: ARE THERE ANY APPARENT SIGNS OF INJURIES/BEHAVIORS THAT COULD BE RELATED TO ABUSE/NEGLECT?: NO

## 2024-03-25 SDOH — SOCIAL STABILITY: SOCIAL INSECURITY: DO YOU FEEL UNSAFE GOING BACK TO THE PLACE WHERE YOU ARE LIVING?: NO

## 2024-03-25 SDOH — SOCIAL STABILITY: SOCIAL INSECURITY: ABUSE: ADULT

## 2024-03-25 SDOH — SOCIAL STABILITY: SOCIAL INSECURITY: ARE YOU OR HAVE YOU BEEN THREATENED OR ABUSED PHYSICALLY, EMOTIONALLY, OR SEXUALLY BY ANYONE?: NO

## 2024-03-25 ASSESSMENT — ACTIVITIES OF DAILY LIVING (ADL)
JUDGMENT_ADEQUATE_SAFELY_COMPLETE_DAILY_ACTIVITIES: YES
HEARING - LEFT EAR: FUNCTIONAL
GROOMING: INDEPENDENT
BATHING: INDEPENDENT
HEARING - RIGHT EAR: FUNCTIONAL
WALKS IN HOME: INDEPENDENT
ADEQUATE_TO_COMPLETE_ADL: YES
FEEDING YOURSELF: INDEPENDENT
DRESSING YOURSELF: INDEPENDENT
LACK_OF_TRANSPORTATION: NO
TOILETING: INDEPENDENT
PATIENT'S MEMORY ADEQUATE TO SAFELY COMPLETE DAILY ACTIVITIES?: YES

## 2024-03-25 ASSESSMENT — COGNITIVE AND FUNCTIONAL STATUS - GENERAL
MOBILITY SCORE: 22
CLIMB 3 TO 5 STEPS WITH RAILING: A LITTLE
PATIENT BASELINE BEDBOUND: NO
DAILY ACTIVITIY SCORE: 24
WALKING IN HOSPITAL ROOM: A LITTLE

## 2024-03-25 ASSESSMENT — PATIENT HEALTH QUESTIONNAIRE - PHQ9
2. FEELING DOWN, DEPRESSED OR HOPELESS: NOT AT ALL
SUM OF ALL RESPONSES TO PHQ9 QUESTIONS 1 & 2: 3
1. LITTLE INTEREST OR PLEASURE IN DOING THINGS: NEARLY EVERY DAY

## 2024-03-25 ASSESSMENT — PAIN - FUNCTIONAL ASSESSMENT
PAIN_FUNCTIONAL_ASSESSMENT: 0-10
PAIN_FUNCTIONAL_ASSESSMENT: 0-10

## 2024-03-25 ASSESSMENT — LIFESTYLE VARIABLES
HAVE PEOPLE ANNOYED YOU BY CRITICIZING YOUR DRINKING: NO
SUBSTANCE_ABUSE_PAST_12_MONTHS: NO
TOTAL SCORE: 0
HAVE YOU EVER FELT YOU SHOULD CUT DOWN ON YOUR DRINKING: NO
HOW MANY STANDARD DRINKS CONTAINING ALCOHOL DO YOU HAVE ON A TYPICAL DAY: PATIENT DOES NOT DRINK
SKIP TO QUESTIONS 9-10: 1
HOW OFTEN DO YOU HAVE 6 OR MORE DRINKS ON ONE OCCASION: NEVER
HOW OFTEN DO YOU HAVE A DRINK CONTAINING ALCOHOL: NEVER
EVER HAD A DRINK FIRST THING IN THE MORNING TO STEADY YOUR NERVES TO GET RID OF A HANGOVER: NO
AUDIT-C TOTAL SCORE: 0
AUDIT-C TOTAL SCORE: 0
EVER FELT BAD OR GUILTY ABOUT YOUR DRINKING: NO
PRESCIPTION_ABUSE_PAST_12_MONTHS: NO

## 2024-03-25 ASSESSMENT — PAIN DESCRIPTION - LOCATION
LOCATION: FOOT
LOCATION: TOE (COMMENT WHICH ONE)
LOCATION: FOOT

## 2024-03-25 ASSESSMENT — PAIN DESCRIPTION - PAIN TYPE: TYPE: ACUTE PAIN

## 2024-03-25 ASSESSMENT — PAIN DESCRIPTION - ORIENTATION
ORIENTATION: RIGHT
ORIENTATION: RIGHT

## 2024-03-25 ASSESSMENT — COLUMBIA-SUICIDE SEVERITY RATING SCALE - C-SSRS
2. HAVE YOU ACTUALLY HAD ANY THOUGHTS OF KILLING YOURSELF?: NO
1. IN THE PAST MONTH, HAVE YOU WISHED YOU WERE DEAD OR WISHED YOU COULD GO TO SLEEP AND NOT WAKE UP?: NO
6. HAVE YOU EVER DONE ANYTHING, STARTED TO DO ANYTHING, OR PREPARED TO DO ANYTHING TO END YOUR LIFE?: NO

## 2024-03-25 ASSESSMENT — PAIN SCALES - GENERAL
PAINLEVEL_OUTOF10: 2
PAINLEVEL_OUTOF10: 8
PAINLEVEL_OUTOF10: 5 - MODERATE PAIN
PAINLEVEL_OUTOF10: 10 - WORST POSSIBLE PAIN

## 2024-03-25 ASSESSMENT — PAIN DESCRIPTION - PROGRESSION: CLINICAL_PROGRESSION: GRADUALLY IMPROVING

## 2024-03-25 NOTE — CONSULTS
PODIATRY CONSULT NOTE    SERVICE DATE: 3/25/2024   SERVICE TIME:  6:22 PM      REASON FOR CONSULT: Right diabetic foot infection  REQUESTING PHYSICIAN: Paul Finley MD   PRIMARY CARE PHYSICIAN: Callum Ruano MD    Subjective   HPI:  Mr. Dyer is a 58 y.o. male who presents for diabetic foot ulceration with uncontrolled diabetes right foot.  Patient states that he has a past medical history consistent of type 2 diabetes, hypertension with medication noncompliance.  Patient states that he noticed a few months back that he had a wound to his distal aspect of his right digit.  He has been soaking it daily in Epsom salt.  He states that at baseline he has minimal to no pain due to his neuropathy.  Patient was an avid drinker but has not been drinking and has admitted to increase in sugary soda drink consumption to offset his alcohol consumption.  Patient states that he does not follow a diabetic diet.  He states that in the past he has been placed on IV antibiotics for an infection in his foot as well as his hand.  He states that he noticed increase in swelling and drainage to the right great toe which prompted him to come to the emergency department.  Patient was examined and evaluated resting comfortably at bedside.  He denies any constitutional symptoms at this time.  No other pedal complaints..        ROS: 10-point review of systems was performed and is otherwise negative except as noted in HPI.  PMH: Reviewed/documented below.  PSH:  Noncontributory except per HPI   FH: Reviewed and noncontributory   SOCIAL:  Reviewed/documented below.  ALLERGIES: Reviewed/documented below.  MEDS: Reviewed/documented below.  VS: Reviewed/documented below.    Past Medical History:   Diagnosis Date    Other specified health status     No pertinent past medical history     Past Surgical History:   Procedure Laterality Date    OTHER SURGICAL HISTORY  11/02/2020    Knee replacement     No family history on file.      No medications  prior to admission.        Medications:  Scheduled Meds: insulin glargine, 15 Units, subcutaneous, Nightly  insulin lispro, 0-10 Units, subcutaneous, TID with meals  nicotine, 1 patch, transdermal, Daily  piperacillin-tazobactam, 3.375 g, intravenous, q8h   And  sodium chloride, 10 mL, intravenous, q8h  [START ON 3/26/2024] vancomycin, 1 g, intravenous, q12h      Continuous Infusions: lactated Ringer's, 75 mL/hr, Last Rate: 75 mL/hr (03/25/24 1737)      PRN Meds: PRN medications: acetaminophen, dextrose, dextrose, glucagon, glucagon, HYDROcodone-acetaminophen, melatonin, morphine, ondansetron **OR** ondansetron, polyethylene glycol, vancomycin    Allergies as of 03/25/2024    (No Known Allergies)            Objective   PHYSICAL EXAM:  Physical Exam Performed:  Vitals:    03/25/24 1632   BP: 160/87   Pulse: 63   Resp:    Temp: 36.5 °C (97.7 °F)   SpO2: 99%     Body mass index is 23.46 kg/m².    Patient is AOx3 and in no acute distress. Patient is alert and cooperative. Sitting comfortably in bed with dressing clean, dry and intact.     Vascular: Faintly palpable DP/PT pulses B/L. No pitting edema noted B/L. Hair growth absent B/L. CFT sluggish to B/L to distal digits.  Temperature is warm to cool from tibial tuberosity to distal digits B/L.  Distal digits are cooler to palpation bilaterally.  No lymphatic streaking noted B/L.  Increase in edema to right hallux.    Musculoskeletal: Gross active and passive ROM intact to age and activity level. Moves all extremities spontaneously. No pain to palpation at feet B/L except for the circumferential right hallux.    Neurological: Intact light touch sensation B/L. Pain stimuli diminished B/L.  Numbness to bilateral lower extremities.  Significant decrease in protective sensation with absence of protective sensation at the distal digits.    Dermatologic: Significant xerosis noted to bilateral lower extremities.  At distal left second and third digit callus with no evidence of  infection.  Right hallux is erythematous and edematous with purulent foul-smelling drainage and necrotic areas of tissue to the circumferential digit.  Wound probes down deep to bone.    LABS:   Results for orders placed or performed during the hospital encounter of 03/25/24 (from the past 24 hour(s))   POCT GLUCOSE   Result Value Ref Range    POCT Glucose 293 (H) 74 - 99 mg/dL   Light Blue Top   Result Value Ref Range    Extra Tube Hold for add-ons.    Lavender Top   Result Value Ref Range    Extra Tube Hold for add-ons.    PST Top   Result Value Ref Range    Extra Tube Hold for add-ons.    CBC and Auto Differential   Result Value Ref Range    WBC 8.6 4.4 - 11.3 x10*3/uL    nRBC 0.0 0.0 - 0.0 /100 WBCs    RBC 4.92 4.50 - 5.90 x10*6/uL    Hemoglobin 14.2 13.5 - 17.5 g/dL    Hematocrit 42.9 41.0 - 52.0 %    MCV 87 80 - 100 fL    MCH 28.9 26.0 - 34.0 pg    MCHC 33.1 32.0 - 36.0 g/dL    RDW 13.4 11.5 - 14.5 %    Platelets 280 150 - 450 x10*3/uL    Neutrophils % 79.8 40.0 - 80.0 %    Immature Granulocytes %, Automated 0.2 0.0 - 0.9 %    Lymphocytes % 12.6 13.0 - 44.0 %    Monocytes % 5.1 2.0 - 10.0 %    Eosinophils % 1.3 0.0 - 6.0 %    Basophils % 1.0 0.0 - 2.0 %    Neutrophils Absolute 6.86 1.20 - 7.70 x10*3/uL    Immature Granulocytes Absolute, Automated 0.02 0.00 - 0.70 x10*3/uL    Lymphocytes Absolute 1.08 (L) 1.20 - 4.80 x10*3/uL    Monocytes Absolute 0.44 0.10 - 1.00 x10*3/uL    Eosinophils Absolute 0.11 0.00 - 0.70 x10*3/uL    Basophils Absolute 0.09 0.00 - 0.10 x10*3/uL   Comprehensive metabolic panel   Result Value Ref Range    Glucose 279 (H) 74 - 99 mg/dL    Sodium 128 (L) 136 - 145 mmol/L    Potassium 3.8 3.5 - 5.3 mmol/L    Chloride 93 (L) 98 - 107 mmol/L    Bicarbonate 26 21 - 32 mmol/L    Anion Gap 13 10 - 20 mmol/L    Urea Nitrogen 20 6 - 23 mg/dL    Creatinine 0.92 0.50 - 1.30 mg/dL    eGFR >90 >60 mL/min/1.73m*2    Calcium 9.6 8.6 - 10.3 mg/dL    Albumin 3.8 3.4 - 5.0 g/dL    Alkaline Phosphatase 92 33  - 120 U/L    Total Protein 9.0 (H) 6.4 - 8.2 g/dL    AST 10 9 - 39 U/L    Bilirubin, Total 0.6 0.0 - 1.2 mg/dL    ALT 11 10 - 52 U/L   Lactate   Result Value Ref Range    Lactate 3.0 (H) 0.4 - 2.0 mmol/L   C-Reactive Protein   Result Value Ref Range    C-Reactive Protein 2.10 (H) <1.00 mg/dL   TSH with reflex to Free T4 if abnormal   Result Value Ref Range    Thyroid Stimulating Hormone 1.16 0.44 - 3.98 mIU/L   Lactate   Result Value Ref Range    Lactate 1.3 0.4 - 2.0 mmol/L   POCT GLUCOSE   Result Value Ref Range    POCT Glucose 195 (H) 74 - 99 mg/dL      Lab Results   Component Value Date    HGBA1C 9.2 (H) 11/28/2023      Lab Results   Component Value Date    CRP 2.10 (H) 03/25/2024      Lab Results   Component Value Date    SEDRATE 11 10/04/2019        Results from last 7 days   Lab Units 03/25/24  1316   WBC AUTO x10*3/uL 8.6   RBC AUTO x10*6/uL 4.92   HEMOGLOBIN g/dL 14.2   HEMATOCRIT % 42.9     Results from last 7 days   Lab Units 03/25/24  1316   SODIUM mmol/L 128*   POTASSIUM mmol/L 3.8   CHLORIDE mmol/L 93*   CO2 mmol/L 26   BUN mg/dL 20   CREATININE mg/dL 0.92   CALCIUM mg/dL 9.6   BILIRUBIN TOTAL mg/dL 0.6   ALT U/L 11   AST U/L 10           IMAGING REVIEW:  XR toe right 2+ views    Result Date: 3/25/2024  STUDY: Toe Radiographs; 3/25/2024 1:56 PM INDICATION: Right great toe infection. COMPARISON: None Available`. ACCESSION NUMBER(S): [id number] ORDERING CLINICIAN: ANA CRISTINA MORENO TECHNIQUE:  Three view(s) of the right great toe. FINDINGS:  Osteolytic changes of the proximal and distal phalanges of the great toe noted associated with pathologic fractures.  Prominent soft tissue swelling of the great toe associated with medial soft tissue ulceration.    Osteomyelitis of the proximal and distal phalanges of the great toe with pathologic fractures. Signed by Fernando Santos MD            Assessment/Plan   ASSESSMENT & PLAN:    # Wet gangrene right hallux  # Osteomyelitis right hallux  # Uncontrolled  diabetes with peripheral neuropathy  - Patient was seen and evaluated; all findings were discussed and all questions were answered to patient's satisfaction.  - Charts, labs, vitals and imaging all reviewed.   - Imaging: X-ray shows evidence of osteomyelitis of right hallux.  MRI ordered  - Labs: No leukocytosis  - PVRs: Pending  - Wound culture: Pending  - Blood culture: Pending    Plan:  - Abx: Per infectious disease  - Pain Regimen: Medicine  -Discussed with patient that he will most likely require amputation of the right hallux.  - Discussed with patient the need for MRI as well as PVR to evaluate further bone involvement, potential deeper abscess, blood flow healing potential.  - Due to patient's history of medical noncompliance patient may require vascular workup prior to surgery.  - Right toe dressed with Betadine, 4 x 4, ABD and Kerlix secured with tape.  - Will follow-up with patient with advanced imaging.  - Please of free to reach out any questions or concerns.  - Patient may be weightbearing as tolerated.  - Thank you for the consult    Soraya Kirby DPM      A total of 60 minutes was spent in formulation of this note, review of charts, labs,  imaging with a minimum of 50% of the time spent in face to face with with the patient.  All questions and concerns were answered to the patients satisfaction.       Off note, use of vocal Dragon dictation system was used to dictate this document.  All proper spelling and grammatical errors may not have been corrected prior to final submission.         SIGNATURE: Soraya Kirby DPM PATIENT NAME: Dionicio Dyer   DATE: March 25, 2024 MRN: [id number]   TIME: 6:22 PM CONTACT: Haiku message

## 2024-03-25 NOTE — CONSULTS
"Vancomycin Dosing by Pharmacy- INITIAL    Dionicio Dyer is a 58 y.o. year old male who Pharmacy has been consulted for vancomycin dosing for other diabetic foot infection . Based on the patient's indication and renal status this patient will be dosed based on a goal AUC of 400-600.     Renal function is currently stable.    Visit Vitals  /87   Pulse 63   Temp 36.5 °C (97.7 °F)   Resp 16        Lab Results   Component Value Date    CREATININE 0.92 03/25/2024    CREATININE 0.80 10/04/2019        Patient weight is No results found for: \"PTWEIGHT\"    No results found for: \"CULTURE\"     No intake/output data recorded.  [unfilled]    No results found for: \"PATIENTTEMP\"       Assessment/Plan     Patient has already been given a loading dose of 2000 mg.  Will initiate vancomycin maintenance,  1000 mg every 12 hours.    This dosing regimen is predicted by [employer] to result in the following pharmacokinetic parameters:  Regimen: 1000 mg IV every 12 hours.  Start time: 03:04 on 03/26/2024  Exposure target: AUC24 (range)400-600 mg/L.hr   AUC24,ss: 508 mg/L.hr  Probability of AUC24 > 400: 74 %  Ctrough,ss: 16.1 mg/L  Probability of Ctrough,ss > 20: 32 %  Probability of nephrotoxicity (Lodise JACOBY 2009): 11 %      Follow-up level will be ordered on 03/25/24 at 2000 and 03/26/24 at 0800 unless clinically indicated sooner.  Will continue to monitor renal function daily while on vancomycin and order serum creatinine at least every 48 hours if not already ordered.  Follow for continued vancomycin needs, clinical response, and signs/symptoms of toxicity.       Claudia Eason, PharmD       "

## 2024-03-25 NOTE — H&P
Medical Group History and Physical    ASSESSMENT & PLAN:     Diabetic foot ulcer of R 1st digit c/b wet gangrene and OM  -p/w worsening R toe pain, skin necrosis, malodorous purulent drainage  -probes to bone in ED  -CRP elevated, XR showed OM  -had admission to [facility] in 11-12/2023 for L hand cellulitis d/t MSSA and R toe cellulitis with possible underlying OM with enterobacter on cx, was discharged with IV Invanz x3 weeks, followed by 90d PO abx (not sure which one)  Plan:  -ID consulted  -podiatry consulted, will likely need amputation this admission  -MRI R foot ordered  -ENRICO ordered  -cont empiric broad spectrum coverage with vanc/zosyn  -fu wound cx, bcx collected in ED  -pain control  -wound care  -gentle IVF    DM2  -check A1C  -hold metformin  -home Lantus at reduced dose, ssi, titrate prn    Hyponatremia  -partially pseudohyponatremia d/t hyperglycemia, but still hyponatremic despite correction for glucose  -check tsh with reflex t4  -trend with IVF, rpt BMP in AM  -hold home hydrochlorothiazide for now  -consider urine studies, nephrology consult if not improving    Tobacco use disorder  -still smoking 1.5-2ppd  -cessation counseling  -nrt with patch    HTN  -home meds except hydrochlorothiazide due to hyponatremia    Vte ppx low risk, scds  PT/OT eval after podiatry jasmin Finley MD    HISTORY OF PRESENT ILLNESS:   Chief Complaint: R toe pain, purulent drainage, necrotic skin    History Of Present Illness:    58M with PMH of DM2, HTN, medication noncompliance who is presenting with worsening R toe pain, blackening, foul purulent drainage. Has been going on and getting worse over the past week. He was seen at [facility] late last year for infection on the same toe, and discharged with x3w IV abx, followed by 90d PO abx. He finished both courses.     In the ED, afebrile, VSS, on room air. Labs showed hyponatremia, lactic acidosis, CRP elevation. XR R toe showed OM with pathologic fractures. Received  vanc/zosyn, IVF, IV morphine.     Review of systems: 10 point review of systems is otherwise negative except as mentioned above.    PAST HISTORIES:     Past Medical History:  He has a past medical history of Other specified health status.    Past Surgical History:  He has a past surgical history that includes Other surgical history (11/02/2020).      Social History:  He has no history on file for tobacco use, alcohol use, and drug use.    Family History:  No family history on file.     Allergies:  Patient has no known allergies.    OBJECTIVE:     Last Recorded Vitals:  Vitals:    03/25/24 1406 03/25/24 1510 03/25/24 1549 03/25/24 1632   BP: 142/70 130/66 131/68 160/87   BP Location:       Pulse: 62 59 59 63   Resp: 18 18 16    Temp:    36.5 °C (97.7 °F)   TempSrc:       SpO2: 99% 94% 95% 99%   Weight:       Height:           Last I/O:  No intake/output data recorded.    Physical Exam:  GEN: healthy appearing, appears stated age, NAD  CV: RRR, no m/r/g, no LE edema  LUNGS: CTAB, no w/r/c  NEURO: A+Ox3, no FND  PSYCH: appropriate mood, affect  SKIN:                Scheduled Medications  insulin glargine, 15 Units, subcutaneous, Nightly  insulin lispro, 0-10 Units, subcutaneous, TID with meals  nicotine, 1 patch, transdermal, Daily  piperacillin-tazobactam, 3.375 g, intravenous, q8h  vancomycin, 2 g, intravenous, Once        PRN Medications  PRN medications: dextrose, dextrose, glucagon, glucagon, HYDROcodone-acetaminophen, morphine, vancomycin    Continuous Medications  lactated Ringer's, 75 mL/hr        Outpatient Medications:  Prior to Admission medications    Not on File       LABS AND IMAGING:     Labs:  Results for orders placed or performed during the hospital encounter of 03/25/24 (from the past 24 hour(s))   POCT GLUCOSE   Result Value Ref Range    POCT Glucose 293 (H) 74 - 99 mg/dL   Light Blue Top   Result Value Ref Range    Extra Tube Hold for add-ons.    Lavender Top   Result Value Ref Range    Extra Tube  Hold for add-ons.    PST Top   Result Value Ref Range    Extra Tube Hold for add-ons.    CBC and Auto Differential   Result Value Ref Range    WBC 8.6 4.4 - 11.3 x10*3/uL    nRBC 0.0 0.0 - 0.0 /100 WBCs    RBC 4.92 4.50 - 5.90 x10*6/uL    Hemoglobin 14.2 13.5 - 17.5 g/dL    Hematocrit 42.9 41.0 - 52.0 %    MCV 87 80 - 100 fL    MCH 28.9 26.0 - 34.0 pg    MCHC 33.1 32.0 - 36.0 g/dL    RDW 13.4 11.5 - 14.5 %    Platelets 280 150 - 450 x10*3/uL    Neutrophils % 79.8 40.0 - 80.0 %    Immature Granulocytes %, Automated 0.2 0.0 - 0.9 %    Lymphocytes % 12.6 13.0 - 44.0 %    Monocytes % 5.1 2.0 - 10.0 %    Eosinophils % 1.3 0.0 - 6.0 %    Basophils % 1.0 0.0 - 2.0 %    Neutrophils Absolute 6.86 1.20 - 7.70 x10*3/uL    Immature Granulocytes Absolute, Automated 0.02 0.00 - 0.70 x10*3/uL    Lymphocytes Absolute 1.08 (L) 1.20 - 4.80 x10*3/uL    Monocytes Absolute 0.44 0.10 - 1.00 x10*3/uL    Eosinophils Absolute 0.11 0.00 - 0.70 x10*3/uL    Basophils Absolute 0.09 0.00 - 0.10 x10*3/uL   Comprehensive metabolic panel   Result Value Ref Range    Glucose 279 (H) 74 - 99 mg/dL    Sodium 128 (L) 136 - 145 mmol/L    Potassium 3.8 3.5 - 5.3 mmol/L    Chloride 93 (L) 98 - 107 mmol/L    Bicarbonate 26 21 - 32 mmol/L    Anion Gap 13 10 - 20 mmol/L    Urea Nitrogen 20 6 - 23 mg/dL    Creatinine 0.92 0.50 - 1.30 mg/dL    eGFR >90 >60 mL/min/1.73m*2    Calcium 9.6 8.6 - 10.3 mg/dL    Albumin 3.8 3.4 - 5.0 g/dL    Alkaline Phosphatase 92 33 - 120 U/L    Total Protein 9.0 (H) 6.4 - 8.2 g/dL    AST 10 9 - 39 U/L    Bilirubin, Total 0.6 0.0 - 1.2 mg/dL    ALT 11 10 - 52 U/L   Lactate   Result Value Ref Range    Lactate 3.0 (H) 0.4 - 2.0 mmol/L   C-Reactive Protein   Result Value Ref Range    C-Reactive Protein 2.10 (H) <1.00 mg/dL   Lactate   Result Value Ref Range    Lactate 1.3 0.4 - 2.0 mmol/L        Imaging:  XR toe right 2+ views  Narrative: STUDY:  Toe Radiographs; 3/25/2024 1:56 PM  INDICATION:  Right great toe  infection.  COMPARISON:  None Available`.  ACCESSION NUMBER(S):  [id number]  ORDERING CLINICIAN:  ANA CRISTINA MORENO  TECHNIQUE:  Three view(s) of the right great toe.  FINDINGS:    Osteolytic changes of the proximal and distal phalanges of the great  toe noted associated with pathologic fractures.  Prominent soft tissue  swelling of the great toe associated with medial soft tissue  ulceration.  Impression: Osteomyelitis of the proximal and distal phalanges of the great toe  with pathologic fractures.  Signed by Fernando Santos MD

## 2024-03-25 NOTE — ED PROVIDER NOTES
HPI   Chief Complaint   Patient presents with    Wound Infection     R foot       58-year-old male presents to the emergency department for right great toe infection.  Patient states that in October, he had a wound on his toe, came to the emergency department and found out he was diabetic.  He was admitted for IV antibiotics and then had a PICC line and outpatient antibiotics.  States that the wound healed up and he has not had any issues until about 2 weeks ago.  States he noticed a wound forming on his toe.  He has not been checking his sugars and he has been trying to avoid coming to the emergency department but states that the pain and smell were so bad today that he decided to come in.  He has not had any fevers but he does endorse chills.  No vomiting.  He is on both insulin and metformin.                          No data recorded                   Patient History   Past Medical History:   Diagnosis Date    Other specified health status     No pertinent past medical history     Past Surgical History:   Procedure Laterality Date    OTHER SURGICAL HISTORY  11/02/2020    Knee replacement     No family history on file.  Social History     Tobacco Use    Smoking status: Not on file    Smokeless tobacco: Not on file   Substance Use Topics    Alcohol use: Not on file    Drug use: Not on file       Physical Exam   ED Triage Vitals   Temperature Heart Rate Respirations BP   03/25/24 1209 03/25/24 1209 03/25/24 1209 03/25/24 1209   36.4 °C (97.5 °F) 91 20 (!) 190/87      Pulse Ox Temp Source Heart Rate Source Patient Position   03/25/24 1209 03/25/24 1209 03/25/24 1309 --   99 % Temporal Monitor       BP Location FiO2 (%)     03/25/24 1209 --     Right arm        Physical Exam  Constitutional:       General: He is not in acute distress.     Appearance: He is not ill-appearing.   Cardiovascular:      Rate and Rhythm: Normal rate.   Pulmonary:      Effort: Pulmonary effort is normal.   Musculoskeletal:      Comments:  Right great toe with a 3 cm medial wound/ulcer which is deep tracking with purulent drainage and malodorous and central necrosis.  The toe is generally edematous and the top of the toe has brown discoloration and edematous skin changes.  Foot is hot and cellulitic but the cellulitis does not extend to the ankle.  2+ pedal pulses are intact.   Neurological:      Mental Status: He is alert.         ED Course & MDM   Diagnoses as of 03/25/24 153   Osteomyelitis of great toe (CMS/[facility])       Medical Decision Making  58-year-old male presents the emergency department for right great toe diabetic foot ulcer.  On my exam, patient has a 3 cm medial great toe ulceration which is deep tracking when I probed it, malodorous and has central necrosis.  The foot is cellulitic.  Labs do not show leukocytosis but patient does have a lactic acid of 3.0.  His CRP is elevated over 2.  Accu-Chek shows a glucose of 293.  He has a pseudohyponatremia along with this.  The patient treated with IV morphine, Zofran, fluids, Zosyn, vancomycin  Blood cultures and wound culture ordered and sent.  X-ray of the right great toe: Osteomyelitis of the proximal and distal phalanges of the great toe with pathologic fractures  I discussed the case with Dr. Finley who kindly agrees to admit.  I discussed the results and plan for hospitalization with the patient and or family/friend if present. Questions were addressed. Patient and/or family/friend expressed understanding and agreement.             Procedure  Procedures     Helga Salmeron PA-C  03/25/24 8320

## 2024-03-26 PROBLEM — M86.9: Status: ACTIVE | Noted: 2024-03-26

## 2024-03-26 LAB
ANION GAP SERPL CALC-SCNC: 12 MMOL/L (ref 10–20)
BUN SERPL-MCNC: 13 MG/DL (ref 6–23)
CALCIUM SERPL-MCNC: 8.9 MG/DL (ref 8.6–10.3)
CHLORIDE SERPL-SCNC: 101 MMOL/L (ref 98–107)
CO2 SERPL-SCNC: 25 MMOL/L (ref 21–32)
CREAT SERPL-MCNC: 0.8 MG/DL (ref 0.5–1.3)
EGFRCR SERPLBLD CKD-EPI 2021: >90 ML/MIN/1.73M*2
ERYTHROCYTE [DISTWIDTH] IN BLOOD BY AUTOMATED COUNT: 13.1 % (ref 11.5–14.5)
GLUCOSE BLD MANUAL STRIP-MCNC: 156 MG/DL (ref 74–99)
GLUCOSE BLD MANUAL STRIP-MCNC: 171 MG/DL (ref 74–99)
GLUCOSE BLD MANUAL STRIP-MCNC: 172 MG/DL (ref 74–99)
GLUCOSE BLD MANUAL STRIP-MCNC: 178 MG/DL (ref 74–99)
GLUCOSE SERPL-MCNC: 142 MG/DL (ref 74–99)
HCT VFR BLD AUTO: 38.4 % (ref 41–52)
HGB BLD-MCNC: 13 G/DL (ref 13.5–17.5)
MAGNESIUM SERPL-MCNC: 1.8 MG/DL (ref 1.6–2.4)
MCH RBC QN AUTO: 28.8 PG (ref 26–34)
MCHC RBC AUTO-ENTMCNC: 33.9 G/DL (ref 32–36)
MCV RBC AUTO: 85 FL (ref 80–100)
NRBC BLD-RTO: 0 /100 WBCS (ref 0–0)
PLATELET # BLD AUTO: 249 X10*3/UL (ref 150–450)
POTASSIUM SERPL-SCNC: 3.8 MMOL/L (ref 3.5–5.3)
RBC # BLD AUTO: 4.51 X10*6/UL (ref 4.5–5.9)
SODIUM SERPL-SCNC: 134 MMOL/L (ref 136–145)
VANCOMYCIN SERPL-MCNC: 15.5 UG/ML (ref 5–20)
WBC # BLD AUTO: 6.8 X10*3/UL (ref 4.4–11.3)

## 2024-03-26 PROCEDURE — 2500000004 HC RX 250 GENERAL PHARMACY W/ HCPCS (ALT 636 FOR OP/ED): Performed by: INTERNAL MEDICINE

## 2024-03-26 PROCEDURE — 82947 ASSAY GLUCOSE BLOOD QUANT: CPT

## 2024-03-26 PROCEDURE — 2500000002 HC RX 250 W HCPCS SELF ADMINISTERED DRUGS (ALT 637 FOR MEDICARE OP, ALT 636 FOR OP/ED): Performed by: INTERNAL MEDICINE

## 2024-03-26 PROCEDURE — 80048 BASIC METABOLIC PNL TOTAL CA: CPT | Performed by: INTERNAL MEDICINE

## 2024-03-26 PROCEDURE — 83735 ASSAY OF MAGNESIUM: CPT | Performed by: INTERNAL MEDICINE

## 2024-03-26 PROCEDURE — 2500000001 HC RX 250 WO HCPCS SELF ADMINISTERED DRUGS (ALT 637 FOR MEDICARE OP): Performed by: INTERNAL MEDICINE

## 2024-03-26 PROCEDURE — 1210000001 HC SEMI-PRIVATE ROOM DAILY

## 2024-03-26 PROCEDURE — 80202 ASSAY OF VANCOMYCIN: CPT

## 2024-03-26 PROCEDURE — S4991 NICOTINE PATCH NONLEGEND: HCPCS | Performed by: INTERNAL MEDICINE

## 2024-03-26 PROCEDURE — 36415 COLL VENOUS BLD VENIPUNCTURE: CPT | Performed by: INTERNAL MEDICINE

## 2024-03-26 PROCEDURE — 99232 SBSQ HOSP IP/OBS MODERATE 35: CPT | Performed by: STUDENT IN AN ORGANIZED HEALTH CARE EDUCATION/TRAINING PROGRAM

## 2024-03-26 PROCEDURE — 2500000001 HC RX 250 WO HCPCS SELF ADMINISTERED DRUGS (ALT 637 FOR MEDICARE OP): Performed by: STUDENT IN AN ORGANIZED HEALTH CARE EDUCATION/TRAINING PROGRAM

## 2024-03-26 PROCEDURE — 99222 1ST HOSP IP/OBS MODERATE 55: CPT | Performed by: NURSE PRACTITIONER

## 2024-03-26 PROCEDURE — 2500000004 HC RX 250 GENERAL PHARMACY W/ HCPCS (ALT 636 FOR OP/ED)

## 2024-03-26 PROCEDURE — 85027 COMPLETE CBC AUTOMATED: CPT | Performed by: INTERNAL MEDICINE

## 2024-03-26 RX ORDER — GABAPENTIN 300 MG/1
600 CAPSULE ORAL 3 TIMES DAILY
Status: DISCONTINUED | OUTPATIENT
Start: 2024-03-26 | End: 2024-04-03 | Stop reason: HOSPADM

## 2024-03-26 RX ADMIN — PIPERACILLIN SODIUM AND TAZOBACTAM SODIUM 3.38 G: 3; .375 INJECTION, SOLUTION INTRAVENOUS at 21:59

## 2024-03-26 RX ADMIN — VANCOMYCIN HYDROCHLORIDE 1 G: 1 INJECTION, SOLUTION INTRAVENOUS at 03:24

## 2024-03-26 RX ADMIN — GABAPENTIN 300 MG: 300 CAPSULE ORAL at 08:30

## 2024-03-26 RX ADMIN — SODIUM CHLORIDE, PRESERVATIVE FREE 10 ML: 5 INJECTION INTRAVENOUS at 13:00

## 2024-03-26 RX ADMIN — SODIUM CHLORIDE, PRESERVATIVE FREE 10 ML: 5 INJECTION INTRAVENOUS at 21:59

## 2024-03-26 RX ADMIN — NICOTINE 1 PATCH: 21 PATCH, EXTENDED RELEASE TRANSDERMAL at 08:30

## 2024-03-26 RX ADMIN — PIPERACILLIN SODIUM AND TAZOBACTAM SODIUM 3.38 G: 3; .375 INJECTION, SOLUTION INTRAVENOUS at 04:48

## 2024-03-26 RX ADMIN — SODIUM CHLORIDE, POTASSIUM CHLORIDE, SODIUM LACTATE AND CALCIUM CHLORIDE 75 ML/HR: 600; 310; 30; 20 INJECTION, SOLUTION INTRAVENOUS at 10:16

## 2024-03-26 RX ADMIN — LISINOPRIL 20 MG: 20 TABLET ORAL at 08:30

## 2024-03-26 RX ADMIN — INSULIN LISPRO 2 UNITS: 100 INJECTION, SOLUTION INTRAVENOUS; SUBCUTANEOUS at 17:00

## 2024-03-26 RX ADMIN — GABAPENTIN 600 MG: 300 CAPSULE ORAL at 15:27

## 2024-03-26 RX ADMIN — VANCOMYCIN HYDROCHLORIDE 1 G: 1 INJECTION, SOLUTION INTRAVENOUS at 17:58

## 2024-03-26 RX ADMIN — Medication 5 MG: at 21:30

## 2024-03-26 RX ADMIN — PIPERACILLIN SODIUM AND TAZOBACTAM SODIUM 3.38 G: 3; .375 INJECTION, SOLUTION INTRAVENOUS at 12:51

## 2024-03-26 RX ADMIN — HYDROCODONE BITARTRATE AND ACETAMINOPHEN 1 TABLET: 5; 325 TABLET ORAL at 09:56

## 2024-03-26 RX ADMIN — SODIUM CHLORIDE, PRESERVATIVE FREE 10 ML: 5 INJECTION INTRAVENOUS at 04:48

## 2024-03-26 RX ADMIN — MORPHINE SULFATE 2 MG: 2 INJECTION, SOLUTION INTRAMUSCULAR; INTRAVENOUS at 15:34

## 2024-03-26 RX ADMIN — HYDROCODONE BITARTRATE AND ACETAMINOPHEN 1 TABLET: 5; 325 TABLET ORAL at 03:29

## 2024-03-26 RX ADMIN — INSULIN GLARGINE 15 UNITS: 100 INJECTION, SOLUTION SUBCUTANEOUS at 21:59

## 2024-03-26 RX ADMIN — HYDROCODONE BITARTRATE AND ACETAMINOPHEN 1 TABLET: 5; 325 TABLET ORAL at 21:30

## 2024-03-26 RX ADMIN — GABAPENTIN 600 MG: 300 CAPSULE ORAL at 21:30

## 2024-03-26 SDOH — HEALTH STABILITY: MENTAL HEALTH: HOW OFTEN DO YOU HAVE SIX OR MORE DRINKS ON ONE OCCASION?: NEVER

## 2024-03-26 SDOH — SOCIAL STABILITY: SOCIAL NETWORK
DO YOU BELONG TO ANY CLUBS OR ORGANIZATIONS SUCH AS CHURCH GROUPS, UNIONS, FRATERNAL OR ATHLETIC GROUPS, OR SCHOOL GROUPS?: NO

## 2024-03-26 SDOH — HEALTH STABILITY: MENTAL HEALTH: HOW OFTEN DO YOU HAVE 6 OR MORE DRINKS ON ONE OCCASION?: NEVER

## 2024-03-26 SDOH — ECONOMIC STABILITY: INCOME INSECURITY: IN THE PAST 12 MONTHS, HAS THE ELECTRIC, GAS, OIL, OR WATER COMPANY THREATENED TO SHUT OFF SERVICE IN YOUR HOME?: NO

## 2024-03-26 SDOH — HEALTH STABILITY: MENTAL HEALTH: HOW OFTEN DO YOU HAVE A DRINK CONTAINING ALCOHOL?: NEVER

## 2024-03-26 SDOH — HEALTH STABILITY: MENTAL HEALTH: HOW MANY DRINKS CONTAINING ALCOHOL DO YOU HAVE ON A TYPICAL DAY WHEN YOU ARE DRINKING?: PATIENT DOES NOT DRINK

## 2024-03-26 SDOH — SOCIAL STABILITY: SOCIAL INSECURITY: WITHIN THE LAST YEAR, HAVE YOU BEEN AFRAID OF YOUR PARTNER OR EX-PARTNER?: NO

## 2024-03-26 SDOH — ECONOMIC STABILITY: FOOD INSECURITY: WITHIN THE PAST 12 MONTHS, THE FOOD YOU BOUGHT JUST DIDN'T LAST AND YOU DIDN'T HAVE MONEY TO GET MORE.: NEVER TRUE

## 2024-03-26 SDOH — HEALTH STABILITY: MENTAL HEALTH
DO YOU FEEL STRESS - TENSE, RESTLESS, NERVOUS, OR ANXIOUS, OR UNABLE TO SLEEP AT NIGHT BECAUSE YOUR MIND IS TROUBLED ALL THE TIME - THESE DAYS?: TO SOME EXTENT

## 2024-03-26 SDOH — ECONOMIC STABILITY: HOUSING INSECURITY: IN THE LAST 12 MONTHS, WAS THERE A TIME WHEN YOU WERE NOT ABLE TO PAY THE MORTGAGE OR RENT ON TIME?: NO

## 2024-03-26 SDOH — SOCIAL STABILITY: SOCIAL INSECURITY
WITHIN THE LAST YEAR, HAVE TO BEEN RAPED OR FORCED TO HAVE ANY KIND OF SEXUAL ACTIVITY BY YOUR PARTNER OR EX-PARTNER?: NO

## 2024-03-26 SDOH — ECONOMIC STABILITY: FOOD INSECURITY: WITHIN THE PAST 12 MONTHS, YOU WORRIED THAT YOUR FOOD WOULD RUN OUT BEFORE YOU GOT MONEY TO BUY MORE.: NEVER TRUE

## 2024-03-26 SDOH — ECONOMIC STABILITY: INCOME INSECURITY: IN THE LAST 12 MONTHS, WAS THERE A TIME WHEN YOU WERE NOT ABLE TO PAY THE MORTGAGE OR RENT ON TIME?: NO

## 2024-03-26 SDOH — SOCIAL STABILITY: SOCIAL NETWORK: HOW OFTEN DO YOU ATTEND CHURCH OR RELIGIOUS SERVICES?: NEVER

## 2024-03-26 SDOH — HEALTH STABILITY: MENTAL HEALTH: HOW MANY STANDARD DRINKS CONTAINING ALCOHOL DO YOU HAVE ON A TYPICAL DAY?: PATIENT DOES NOT DRINK

## 2024-03-26 SDOH — SOCIAL STABILITY: SOCIAL INSECURITY: WITHIN THE LAST YEAR, HAVE YOU BEEN HUMILIATED OR EMOTIONALLY ABUSED IN OTHER WAYS BY YOUR PARTNER OR EX-PARTNER?: NO

## 2024-03-26 SDOH — SOCIAL STABILITY: SOCIAL NETWORK: ARE YOU MARRIED, WIDOWED, DIVORCED, SEPARATED, NEVER MARRIED, OR LIVING WITH A PARTNER?: MARRIED

## 2024-03-26 SDOH — HEALTH STABILITY: MENTAL HEALTH
STRESS IS WHEN SOMEONE FEELS TENSE, NERVOUS, ANXIOUS, OR CAN'T SLEEP AT NIGHT BECAUSE THEIR MIND IS TROUBLED. HOW STRESSED ARE YOU?: TO SOME EXTENT

## 2024-03-26 SDOH — SOCIAL STABILITY: SOCIAL INSECURITY
WITHIN THE LAST YEAR, HAVE YOU BEEN RAPED OR FORCED TO HAVE ANY KIND OF SEXUAL ACTIVITY BY YOUR PARTNER OR EX-PARTNER?: NO

## 2024-03-26 SDOH — ECONOMIC STABILITY: HOUSING INSECURITY: IN THE LAST 12 MONTHS, HOW MANY PLACES HAVE YOU LIVED?: 1

## 2024-03-26 SDOH — SOCIAL STABILITY: SOCIAL NETWORK
IN A TYPICAL WEEK, HOW MANY TIMES DO YOU TALK ON THE PHONE WITH FAMILY, FRIENDS, OR NEIGHBORS?: MORE THAN THREE TIMES A WEEK

## 2024-03-26 SDOH — SOCIAL STABILITY: SOCIAL INSECURITY
WITHIN THE LAST YEAR, HAVE YOU BEEN KICKED, HIT, SLAPPED, OR OTHERWISE PHYSICALLY HURT BY YOUR PARTNER OR EX-PARTNER?: NO

## 2024-03-26 SDOH — SOCIAL STABILITY: SOCIAL INSECURITY: ARE YOU MARRIED, WIDOWED, DIVORCED, SEPARATED, NEVER MARRIED, OR LIVING WITH A PARTNER?: MARRIED

## 2024-03-26 SDOH — ECONOMIC STABILITY: FOOD INSECURITY: HOW HARD IS IT FOR YOU TO PAY FOR THE VERY BASICS LIKE FOOD, HOUSING, MEDICAL CARE, AND HEATING?: NOT HARD AT ALL

## 2024-03-26 SDOH — SOCIAL STABILITY: SOCIAL NETWORK: HOW OFTEN DO YOU GET TOGETHER WITH FRIENDS OR RELATIVES?: ONCE A WEEK

## 2024-03-26 SDOH — ECONOMIC STABILITY: INCOME INSECURITY: IN THE PAST 12 MONTHS HAS THE ELECTRIC, GAS, OIL, OR WATER COMPANY THREATENED TO SHUT OFF SERVICES IN YOUR HOME?: NO

## 2024-03-26 SDOH — SOCIAL STABILITY: SOCIAL NETWORK
DO YOU BELONG TO ANY CLUBS OR ORGANIZATIONS SUCH AS CHURCH GROUPS UNIONS, FRATERNAL OR ATHLETIC GROUPS, OR SCHOOL GROUPS?: NO

## 2024-03-26 SDOH — ECONOMIC STABILITY: TRANSPORTATION INSECURITY
IN THE PAST 12 MONTHS, HAS THE LACK OF TRANSPORTATION KEPT YOU FROM MEDICAL APPOINTMENTS OR FROM GETTING MEDICATIONS?: NO

## 2024-03-26 SDOH — ECONOMIC STABILITY: TRANSPORTATION INSECURITY
IN THE PAST 12 MONTHS, HAS LACK OF TRANSPORTATION KEPT YOU FROM MEETINGS, WORK, OR FROM GETTING THINGS NEEDED FOR DAILY LIVING?: NO

## 2024-03-26 SDOH — ECONOMIC STABILITY: FOOD INSECURITY: WITHIN THE PAST 12 MONTHS, YOU WORRIED THAT YOUR FOOD WOULD RUN OUT BEFORE YOU GOT THE MONEY TO BUY MORE.: NEVER TRUE

## 2024-03-26 SDOH — ECONOMIC STABILITY: INCOME INSECURITY: HOW HARD IS IT FOR YOU TO PAY FOR THE VERY BASICS LIKE FOOD, HOUSING, MEDICAL CARE, AND HEATING?: NOT HARD AT ALL

## 2024-03-26 SDOH — SOCIAL STABILITY: SOCIAL NETWORK: HOW OFTEN DO YOU ATTEND MEETINGS OF THE CLUBS OR ORGANIZATIONS YOU BELONG TO?: NEVER

## 2024-03-26 SDOH — ECONOMIC STABILITY: TRANSPORTATION INSECURITY: IN THE PAST 12 MONTHS, HAS LACK OF TRANSPORTATION KEPT YOU FROM MEDICAL APPOINTMENTS OR FROM GETTING MEDICATIONS?: NO

## 2024-03-26 SDOH — SOCIAL STABILITY: SOCIAL NETWORK: HOW OFTEN DO YOU ATTENT MEETINGS OF THE CLUB OR ORGANIZATION YOU BELONG TO?: NEVER

## 2024-03-26 ASSESSMENT — PAIN DESCRIPTION - ORIENTATION
ORIENTATION: RIGHT
ORIENTATION: RIGHT

## 2024-03-26 ASSESSMENT — COGNITIVE AND FUNCTIONAL STATUS - GENERAL
WALKING IN HOSPITAL ROOM: A LITTLE
DAILY ACTIVITIY SCORE: 24
MOBILITY SCORE: 23
DAILY ACTIVITIY SCORE: 24
DAILY ACTIVITIY SCORE: 24
MOBILITY SCORE: 24
CLIMB 3 TO 5 STEPS WITH RAILING: A LITTLE
MOBILITY SCORE: 22
CLIMB 3 TO 5 STEPS WITH RAILING: A LITTLE

## 2024-03-26 ASSESSMENT — ENCOUNTER SYMPTOMS
CONSTITUTIONAL NEGATIVE: 1
WOUND: 1
COLOR CHANGE: 1
NEUROLOGICAL NEGATIVE: 1
GASTROINTESTINAL NEGATIVE: 1
JOINT SWELLING: 1
CARDIOVASCULAR NEGATIVE: 1

## 2024-03-26 ASSESSMENT — LIFESTYLE VARIABLES
SKIP TO QUESTIONS 9-10: 1
AUDIT-C TOTAL SCORE: 0

## 2024-03-26 ASSESSMENT — PAIN DESCRIPTION - LOCATION
LOCATION: TOE (COMMENT WHICH ONE)
LOCATION: TOE (COMMENT WHICH ONE)

## 2024-03-26 ASSESSMENT — ACTIVITIES OF DAILY LIVING (ADL)
LACK_OF_TRANSPORTATION: NO
LACK_OF_TRANSPORTATION: NO

## 2024-03-26 ASSESSMENT — PAIN SCALES - GENERAL
PAINLEVEL_OUTOF10: 8
PAINLEVEL_OUTOF10: 8
PAINLEVEL_OUTOF10: 0 - NO PAIN

## 2024-03-26 ASSESSMENT — PAIN - FUNCTIONAL ASSESSMENT
PAIN_FUNCTIONAL_ASSESSMENT: 0-10
PAIN_FUNCTIONAL_ASSESSMENT: 0-10

## 2024-03-26 NOTE — PROGRESS NOTES
PODIATRY SERVICE CONSULT PROGRESS NOTE    SERVICE DATE: 3/26/2024   SERVICE TIME:  7:52 AM      Subjective   INTERVAL HPI:   Pt was seen at bedside.  Pain well controlled.  Patient denies any constitutional symptoms.   No other pedal complaints.       Medications:  Scheduled Meds: gabapentin, 300 mg, oral, TID  insulin glargine, 15 Units, subcutaneous, Nightly  insulin lispro, 0-10 Units, subcutaneous, TID with meals  lisinopril, 20 mg, oral, Daily  nicotine, 1 patch, transdermal, Daily  piperacillin-tazobactam, 3.375 g, intravenous, q8h   And  sodium chloride, 10 mL, intravenous, q8h  vancomycin, 1 g, intravenous, q12h      Continuous Infusions: lactated Ringer's, 75 mL/hr, Last Rate: 75 mL/hr (03/25/24 7667)      PRN Meds: PRN medications: acetaminophen, albuterol, dextrose, dextrose, glucagon, glucagon, HYDROcodone-acetaminophen, melatonin, morphine, ondansetron **OR** ondansetron, polyethylene glycol, vancomycin         Objective   PHYSICAL EXAM:  Physical Exam Performed:  Vitals:    03/26/24 0735   BP: 150/67   Pulse: 73   Resp:    Temp: 36.7 °C (98.1 °F)   SpO2: 96%     Body mass index is 23.46 kg/m².    Patient is AOx3 and in no acute distress. Patient is alert and cooperative. Sitting comfortably in bed with dressing clean, dry and intact.      Vascular: Faintly palpable DP/PT pulses B/L. No pitting edema noted B/L. Hair growth absent B/L. CFT sluggish to B/L to distal digits.  Temperature is warm to cool from tibial tuberosity to distal digits B/L.  Distal digits are cooler to palpation bilaterally.  No lymphatic streaking noted B/L.  Increase in edema to right hallux.     Musculoskeletal: Gross active and passive ROM intact to age and activity level. Moves all extremities spontaneously. No pain to palpation at feet B/L except for the circumferential right hallux.     Neurological: Intact light touch sensation B/L. Pain stimuli diminished B/L.  Numbness to bilateral lower extremities.  Significant decrease  in protective sensation with absence of protective sensation at the distal digits.     Dermatologic: Significant xerosis noted to bilateral lower extremities.  At distal left second and third digit callus with no evidence of infection.  Right hallux is erythematous and edematous with purulent foul-smelling drainage and necrotic areas of tissue to the circumferential digit.  Wound probes down deep to bone.       LABS:   Results for orders placed or performed during the hospital encounter of 03/25/24 (from the past 24 hour(s))   POCT GLUCOSE   Result Value Ref Range    POCT Glucose 293 (H) 74 - 99 mg/dL   Light Blue Top   Result Value Ref Range    Extra Tube Hold for add-ons.    Lavender Top   Result Value Ref Range    Extra Tube Hold for add-ons.    PST Top   Result Value Ref Range    Extra Tube Hold for add-ons.    Tissue/Wound Culture/Smear    Specimen: Diabetic Foot Ulcer; Tissue/Biopsy   Result Value Ref Range    Gram Stain (1+) Rare Polymorphonuclear leukocytes (A)     Gram Stain (3+) Moderate Gram positive cocci (A)    Blood Culture    Specimen: Peripheral Venipuncture; Blood culture   Result Value Ref Range    Blood Culture Loaded on Instrument - Culture in progress    CBC and Auto Differential   Result Value Ref Range    WBC 8.6 4.4 - 11.3 x10*3/uL    nRBC 0.0 0.0 - 0.0 /100 WBCs    RBC 4.92 4.50 - 5.90 x10*6/uL    Hemoglobin 14.2 13.5 - 17.5 g/dL    Hematocrit 42.9 41.0 - 52.0 %    MCV 87 80 - 100 fL    MCH 28.9 26.0 - 34.0 pg    MCHC 33.1 32.0 - 36.0 g/dL    RDW 13.4 11.5 - 14.5 %    Platelets 280 150 - 450 x10*3/uL    Neutrophils % 79.8 40.0 - 80.0 %    Immature Granulocytes %, Automated 0.2 0.0 - 0.9 %    Lymphocytes % 12.6 13.0 - 44.0 %    Monocytes % 5.1 2.0 - 10.0 %    Eosinophils % 1.3 0.0 - 6.0 %    Basophils % 1.0 0.0 - 2.0 %    Neutrophils Absolute 6.86 1.20 - 7.70 x10*3/uL    Immature Granulocytes Absolute, Automated 0.02 0.00 - 0.70 x10*3/uL    Lymphocytes Absolute 1.08 (L) 1.20 - 4.80 x10*3/uL     Monocytes Absolute 0.44 0.10 - 1.00 x10*3/uL    Eosinophils Absolute 0.11 0.00 - 0.70 x10*3/uL    Basophils Absolute 0.09 0.00 - 0.10 x10*3/uL   Comprehensive metabolic panel   Result Value Ref Range    Glucose 279 (H) 74 - 99 mg/dL    Sodium 128 (L) 136 - 145 mmol/L    Potassium 3.8 3.5 - 5.3 mmol/L    Chloride 93 (L) 98 - 107 mmol/L    Bicarbonate 26 21 - 32 mmol/L    Anion Gap 13 10 - 20 mmol/L    Urea Nitrogen 20 6 - 23 mg/dL    Creatinine 0.92 0.50 - 1.30 mg/dL    eGFR >90 >60 mL/min/1.73m*2    Calcium 9.6 8.6 - 10.3 mg/dL    Albumin 3.8 3.4 - 5.0 g/dL    Alkaline Phosphatase 92 33 - 120 U/L    Total Protein 9.0 (H) 6.4 - 8.2 g/dL    AST 10 9 - 39 U/L    Bilirubin, Total 0.6 0.0 - 1.2 mg/dL    ALT 11 10 - 52 U/L   Lactate   Result Value Ref Range    Lactate 3.0 (H) 0.4 - 2.0 mmol/L   C-Reactive Protein   Result Value Ref Range    C-Reactive Protein 2.10 (H) <1.00 mg/dL   Hemoglobin A1C   Result Value Ref Range    Hemoglobin A1C 8.9 (H) see below %    Estimated Average Glucose 209 Not Established mg/dL   TSH with reflex to Free T4 if abnormal   Result Value Ref Range    Thyroid Stimulating Hormone 1.16 0.44 - 3.98 mIU/L   Blood Culture    Specimen: Peripheral Venipuncture; Blood culture   Result Value Ref Range    Blood Culture Loaded on Instrument - Culture in progress    Lactate   Result Value Ref Range    Lactate 1.3 0.4 - 2.0 mmol/L   POCT GLUCOSE   Result Value Ref Range    POCT Glucose 195 (H) 74 - 99 mg/dL   POCT GLUCOSE   Result Value Ref Range    POCT Glucose 195 (H) 74 - 99 mg/dL   Vascular US ankle brachial index (ENRICO) without exercise   Result Value Ref Range    BSA 1.95 m2   SST TOP   Result Value Ref Range    Extra Tube Hold for add-ons.    Vancomycin   Result Value Ref Range    Vancomycin 17.6 5.0 - 20.0 ug/mL   Magnesium   Result Value Ref Range    Magnesium 1.80 1.60 - 2.40 mg/dL   Basic Metabolic Panel   Result Value Ref Range    Glucose 142 (H) 74 - 99 mg/dL    Sodium 134 (L) 136 - 145  mmol/L    Potassium 3.8 3.5 - 5.3 mmol/L    Chloride 101 98 - 107 mmol/L    Bicarbonate 25 21 - 32 mmol/L    Anion Gap 12 10 - 20 mmol/L    Urea Nitrogen 13 6 - 23 mg/dL    Creatinine 0.80 0.50 - 1.30 mg/dL    eGFR >90 >60 mL/min/1.73m*2    Calcium 8.9 8.6 - 10.3 mg/dL   CBC   Result Value Ref Range    WBC 6.8 4.4 - 11.3 x10*3/uL    nRBC 0.0 0.0 - 0.0 /100 WBCs    RBC 4.51 4.50 - 5.90 x10*6/uL    Hemoglobin 13.0 (L) 13.5 - 17.5 g/dL    Hematocrit 38.4 (L) 41.0 - 52.0 %    MCV 85 80 - 100 fL    MCH 28.8 26.0 - 34.0 pg    MCHC 33.9 32.0 - 36.0 g/dL    RDW 13.1 11.5 - 14.5 %    Platelets 249 150 - 450 x10*3/uL   POCT GLUCOSE   Result Value Ref Range    POCT Glucose 156 (H) 74 - 99 mg/dL      Lab Results   Component Value Date    HGBA1C 8.9 (H) 03/25/2024      Lab Results   Component Value Date    CRP 2.10 (H) 03/25/2024      Lab Results   Component Value Date    SEDRATE 11 10/04/2019        Results from last 7 days   Lab Units 03/26/24  0541   WBC AUTO x10*3/uL 6.8   RBC AUTO x10*6/uL 4.51   HEMOGLOBIN g/dL 13.0*   HEMATOCRIT % 38.4*     Results from last 7 days   Lab Units 03/26/24  0541 03/25/24  1316   SODIUM mmol/L 134* 128*   POTASSIUM mmol/L 3.8 3.8   CHLORIDE mmol/L 101 93*   CO2 mmol/L 25 26   BUN mg/dL 13 20   CREATININE mg/dL 0.80 0.92   CALCIUM mg/dL 8.9 9.6   MAGNESIUM mg/dL 1.80  --    BILIRUBIN TOTAL mg/dL  --  0.6   ALT U/L  --  11   AST U/L  --  10           IMAGING REVIEW:  Vascular US ankle brachial index (ENRICO) without exercise    Result Date: 3/25/2024  Preliminary Cardiology Report             [address] [place] [address]     Tel [phone] Fax [phone]            Preliminary Vascular Lab Report  VASC US ANKLE BRACHIAL INDEX (ENRICO) WITHOUT EXERCISE  Patient Name:     KATYA Bates Physician:  [id number] Gato Spivey MD Study Date:       3/25/2024       Ordering Provider:  [id number] KEYANNA HENRY MRN/PID:          [id number]        Fellow: Accession#:        [id number]    Technologist:       Sheba Kumari RDMS, RVT YOB: 1965      Technologist 2: Gender:           M               Encounter#:         [id number] Admission Status: Inpatient       Location Performed: [facility]  Diagnosis/ICD: Peripheral vascular disease, unspecified-I73.9 CPT Codes:     45694 Peripheral artery ENRICO Only  Smoker:            Current. Pertinent History: Claudication, Leg pain and HTN. Grangrene on left 2nd toe.  PRELIMINARY CONCLUSIONS:  Right Lower PVR: No evidence of arterial occlusive disease in the right lower extremity at rest. Decreased digital perfusion noted. Monophasic flow is noted in the right posterior tibial artery and right dorsalis pedis artery. Triphasic flow is noted in the right common femoral artery. Right toe tracing taken on 2nd toe due to big toe wound and bandage. Left Lower PVR: No evidence of arterial occlusive disease in the left lower extremity at rest. Normal digital perfusion noted. Triphasic flow is noted in the left common femoral artery, left dorsalis pedis artery and left posterior tibial artery. Unable to get left blood pressure due to IV placement.  Imaging & Doppler Findings:  RIGHT Lower PVR                Pressures Ratios Right Posterior Tibial (Ankle) 109 mmHg  0.80 Right Dorsalis Pedis (Ankle)   140 mmHg  1.03 Right Digit (Great Toe)        58 mmHg   0.43   LEFT Lower PVR                Pressures Ratios Left Posterior Tibial (Ankle) 146 mmHg  1.07 Left Dorsalis Pedis (Ankle)   133 mmHg  0.98 Left Digit (Great Toe)        113 mmHg  0.83                      Right Brachial Pressure 136 mmHg   VASCULAR PRELIMINARY REPORT completed by Sheba Kumari RDMS, RVT on 3/25/2024 at 8:22:48 PM  ** Final **     XR toe right 2+ views    Result Date: 3/25/2024  STUDY: Toe Radiographs; 3/25/2024 1:56 PM INDICATION: Right great toe infection. COMPARISON: None Available`. ACCESSION NUMBER(S): [id number] ORDERING CLINICIAN: ANA CRISTINA MIN  MORENO TECHNIQUE:  Three view(s) of the right great toe. FINDINGS:  Osteolytic changes of the proximal and distal phalanges of the great toe noted associated with pathologic fractures.  Prominent soft tissue swelling of the great toe associated with medial soft tissue ulceration.    Osteomyelitis of the proximal and distal phalanges of the great toe with pathologic fractures. Signed by Fernando Santos MD            Assessment/Plan   ASSESSMENT & PLAN:    # Wet gangrene right hallux  # Osteomyelitis right hallux  # Uncontrolled diabetes with peripheral neuropathy  - Patient was seen and evaluated; all findings were discussed and all questions were answered to patient's satisfaction.  - Charts, labs, vitals and imaging all reviewed.   - Imaging: X-ray shows evidence of osteomyelitis of right hallux.  MRI ordered  Waiting for official read of MRI, wet read shows destruction with loss of T1 signal and increase in T2 of the right hallux distal and proximal phalanx.  Change in bony signaling of the proximal first metatarsal.  - Labs: No leukocytosis  - PVRs: Right Lower PVR: No evidence of arterial occlusive disease in the right lower extremity at rest. Decreased digital perfusion noted. Monophasic flow is noted in the right posterior tibial artery and right dorsalis pedis artery. Triphasic flow is noted in the right common femoral artery. Right toe tracing taken on 2nd toe due to big toe wound and bandage.  Left Lower PVR: No evidence of arterial occlusive disease in the left lower extremity at rest. Normal digital perfusion noted. Triphasic flow is noted in the left common femoral artery, left dorsalis pedis artery and left posterior tibial artery. Unable to get left blood pressure due to IV placement.     Imaging & Doppler Findings:     RIGHT Lower PVR                Pressures Ratios  Right Posterior Tibial (Ankle) 109 mmHg  0.80  Right Dorsalis Pedis (Ankle)   140 mmHg  1.03  Right Digit (Great Toe)        58 mmHg    0.43           LEFT Lower PVR                Pressures Ratios  Left Posterior Tibial (Ankle) 146 mmHg  1.07  Left Dorsalis Pedis (Ankle)   133 mmHg  0.98  Left Digit (Great Toe)        113 mmHg  0.83     - Wound culture:      Abnormal   (1+) Rare Polymorphonuclear leukocytes      (3+) Moderate Gram positive cocci        - Blood culture: Pending     Plan:  - Abx: Per infectious disease  - Pain Regimen: Medicine  -Discussed with patient that he will most likely require amputation of the right hallux.  - Discussed with patient that the preliminary read for the MRI shows osteomyelitis of the right distal phalanx with potential involvement of the distal right first metatarsal.  Will await final read for surgical planning.    -Discussed with patient that there is decreased digital perfusion flow noted on his PVR/ENRICO.  Will consult vascular to take a look for healing potential with possible need for angiogram.  - Right toe dressed with Betadine, 4 x 4, ABD and Kerlix secured with tape.  Dressing orders placed and nursing staff may change daily.  Thank you  - Please of free to reach out any questions or concerns.  - Patient may be weightbearing as tolerated.  - Thank you for the consult     Soraya Kirby DPM        A total of 30 minutes was spent in formulation of this note, review of charts, labs,  imaging with a minimum of 50% of the time spent in face to face with with the patient.  All questions and concerns were answered to the patients satisfaction.         Off note, use of [employer] dictation system was used to dictate this document.  All proper spelling and grammatical errors may not have been corrected prior to final submission.         SIGNATURE: Soraya Kirby DPM PATIENT NAME: Dionicio Dyer   DATE: March 26, 2024 MRN: [id number]   TIME: 7:52 AM CONTACT: Haiku Message

## 2024-03-26 NOTE — PROGRESS NOTES
03/26/24 1032   Discharge Planning   Living Arrangements Spouse/significant other   Support Systems Spouse/significant other   Assistance Needed None, Ind ADLS and IADLS no AD, drives, works, denies falls   Type of Residence Private residence  (3 level home, bedroom/bathroom upstairs and bathroom 1st floor)   Number of Stairs to Enter Residence 3   Number of Stairs Within Residence 30   Do you have animals or pets at home? Yes   Type of Animals or Pets 3 dogs (Palau[place] Shephards)   Home or Post Acute Services In home services   Type of Home Care Services Home nursing visits   Patient expects to be discharged to: Home with [facility]   Does the patient need discharge transport arranged? No   Financial Resource Strain   How hard is it for you to pay for the very basics like food, housing, medical care, and heating? Not hard   Housing Stability   In the last 12 months, was there a time when you were not able to pay the mortgage or rent on time? N   In the last 12 months, how many places have you lived? 1   In the last 12 months, was there a time when you did not have a steady place to sleep or slept in a shelter (including now)? N   Transportation Needs   In the past 12 months, has lack of transportation kept you from medical appointments or from getting medications? no   In the past 12 months, has lack of transportation kept you from meetings, work, or from getting things needed for daily living? No   Patient Choice   Provider Choice list and CMS website (https://medicare.gov/care-compare#search) for post-acute Quality and Resource Measure Data were provided and reviewed with: Patient   Patient / Family choosing to utilize agency / facility established prior to hospitalization No     Pt from home with wife, PTA ind ADLS and IADLS no assistive devices, drives, works, denies falls. Currently in OBS status for diabetic foot ulcer associated with DM d/t underlying condition. Pt states was just diagnosed with Diabetes Nov/Dec  2023, check blood sugar only 2-3 times a week, takes insulin and Metformin. Pt is open to Diabetic Education, and states needs to find Endocrinologist. Podiatry, ID and vascular on consult. Pt states if needs daily wound care that he would like [facility] and AOC is [facility], and that pt/wife can learn the wound care. Pt recently saw PCP Dr. Callum Ruano. Pt prefers home with [facility] upon DC. Demographics verified. Attending notified of this information.

## 2024-03-26 NOTE — PROGRESS NOTES
"Vancomycin Dosing by Pharmacy- FOLLOW UP    Dionicio Dyer is a 58 y.o. year old male who Pharmacy has been consulted for vancomycin dosing for diabetic foot infection. Based on the patient's indication and renal status this patient is being dosed based on a goal AUC of 400-600.     Renal function is currently stable.    Current vancomycin dose: 1000 mg given every 12 hours    Estimated vancomycin AUC on current dose: 423 mg/L.hr     Visit Vitals  /67 (BP Location: Right arm, Patient Position: Lying)   Pulse 73   Temp 36.7 °C (98.1 °F) (Temporal)   Resp 18        Lab Results   Component Value Date    CREATININE 0.80 03/26/2024    CREATININE 0.92 03/25/2024    CREATININE 0.80 10/04/2019        Patient weight is No results found for: \"PTWEIGHT\"    No results found for: \"CULTURE\"     I/O last 3 completed shifts:  In: 1751.3 (23 mL/kg) [I.V.:981.3 (12.9 mL/kg); IV Piggyback:770]  Out: - (0 mL/kg)   Weight: 76.3 kg   [unfilled]    No results found for: \"PATIENTTEMP\"     Assessment/Plan    Within goal AUC range. Continue current vancomycin regimen.    This dosing regimen is predicted by [employer] to result in the following pharmacokinetic parameters:  Regimen: 1000 mg IV every 12 hours.  Start time: 15:24 on 03/26/2024  Exposure target: AUC24 (range)400-600 mg/L.hr   AUC24,ss: 423 mg/L.hr  Probability of AUC24 > 400: 59 %  Ctrough,ss: 12.8 mg/L  Probability of Ctrough,ss > 20: 10 %  Probability of nephrotoxicity (Lodise JACOBY 2009): 8 %      The next level will be obtained on 3/27 at 0500. May be obtained sooner if clinically indicated.   Will continue to monitor renal function daily while on vancomycin and order serum creatinine at least every 48 hours if not already ordered.  Follow for continued vancomycin needs, clinical response, and signs/symptoms of toxicity.       Nasreen Lynch, [facility]           "

## 2024-03-26 NOTE — CONSULTS
"Nutrition Initial Assessment:   Nutrition Assessment    Reason for Assessment: Admission nursing screening    Patient is a 58 y.o. male presenting with diabetic foot ulcer. Pt with past medical history of HTN, HLD, DM2, and medical noncompliance       Nutrition History:  Energy Intake:  (no meal intakes recorded at this time)  Food and Nutrient History: RDN consult for MST score of 2. Pt reports wt loss and decreased appetite since starting Metformin 1 month ago, states food has been tasting different. Pt states UBW was 181-186 lbs prior to wt loss. Pt states he is eaing 1-2 meals/day and portions are smaller, states he was trying to get blood sugar better controled. Pt denies supplement use at home. Pt denies N/V/C/D at this time. Pt denies chewing or swallowing difficulty. Discussed adding Glucerna shakes due to lower appetite and wounds present, pt agreeable. Discussed importance of protein with meals and lower carb intake to help with blood glucose control. Will continue to monitor.  Food Allergies/Intolerances:  None  GI Symptoms: None  Oral Problems: None       Anthropometrics:  Height: 180.3 cm (5' 11\")   Weight: 76.3 kg (168 lb 3.4 oz)   BMI (Calculated): 23.47  IBW/kg (Dietitian Calculated): 78.2 kg  Percent of IBW: 98 %       Weight History:   Wt Readings from Last 10 Encounters:   03/25/24 76.3 kg (168 lb 3.4 oz)   02/12/21 89.9 kg (198 lb 2 oz)   11/02/20 86.3 kg (190 lb 3 oz)      Weight Change %:  Weight History / % Weight Change: Pt reports 16 lb, 8.6% significant wt loss x 1 month  Significant Weight Loss: Yes  Interpretation of Weight Loss: >5% in 1 month    Nutrition Focused Physical Exam Findings:    Subcutaneous Fat Loss:   Orbital Fat Pads: Mild-Moderate (slight dark circles and slight hollowing)  Buccal Fat Pads: Mild-Moderate (flat cheeks, minimal bounce)  Triceps: Mild-moderate (less than ample fat tissue)  Muscle Wasting:  Temporalis: Mild-Moderate (slight depression)  Pectoralis (Clavicular " Region): Well nourished (clavicle not visible)  Deltoid/Trapezius: Well nourished (rounded appearance at arm, shoulder, neck)  Interosseous: Well nourished (muscle bulges)  Edema:  Edema: +1 trace  Edema Location: RLE  Physical Findings:  Skin: Positive (diabetic ulcer to right great toe)    Nutrition Significant Labs:  CBC Trend:   Results from last 7 days   Lab Units 03/26/24  0541 03/25/24  1316   WBC AUTO x10*3/uL 6.8 8.6   RBC AUTO x10*6/uL 4.51 4.92   HEMOGLOBIN g/dL 13.0* 14.2   HEMATOCRIT % 38.4* 42.9   MCV fL 85 87   PLATELETS AUTO x10*3/uL 249 280    , BMP Trend:   Results from last 7 days   Lab Units 03/26/24  0541 03/25/24  1316   GLUCOSE mg/dL 142* 279*   CALCIUM mg/dL 8.9 9.6   SODIUM mmol/L 134* 128*   POTASSIUM mmol/L 3.8 3.8   CO2 mmol/L 25 26   CHLORIDE mmol/L 101 93*   BUN mg/dL 13 20   CREATININE mg/dL 0.80 0.92    , BG POCT trend:   Results from last 7 days   Lab Units 03/26/24  1555 03/26/24  1134 03/26/24  0644 03/25/24 2013 03/25/24  1639   POCT GLUCOSE mg/dL 171* 178* 156* 195* 195*    , Renal Lab Trend:   Results from last 7 days   Lab Units 03/26/24  0541 03/25/24  1316   POTASSIUM mmol/L 3.8 3.8   SODIUM mmol/L 134* 128*   MAGNESIUM mg/dL 1.80  --    EGFR mL/min/1.73m*2 >90 >90   BUN mg/dL 13 20   CREATININE mg/dL 0.80 0.92        Nutrition Specific Medications:  Reviewed     I/O:   Last BM Date: 03/25/24;      Dietary Orders (From admission, onward)       Start     Ordered    03/25/24 1642  Adult diet Regular, Carb Controlled, Cardiac; 60 gram carb/meal, 30 gram Carb evening snack; 70 gm fat; 2 - 3 grams Sodium  Diet effective now        Question Answer Comment   Diet type Regular    Diet type Carb Controlled    Diet type Cardiac    Carb diet selection: 60 gram carb/meal, 30 gram Carb evening snack    Fat restriction: 70 gm fat    Sodium restriction: 2 - 3 grams Sodium        03/25/24 1641                     Estimated Needs:   Total Energy Estimated Needs (kCal): 1900 kCal  Method for  Estimating Needs: 25 kcal/kg ABW  Total Protein Estimated Needs (g): 114 g  Method for Estimating Needs: 1.5 g/kg ABW  Total Fluid Estimated Needs (mL): 1900 mL  Method for Estimating Needs: 1 ml/kcal or per MD        Nutrition Diagnosis   Malnutrition Diagnosis  Patient has Malnutrition Diagnosis: Yes  Diagnosis Status: New  Malnutrition Diagnosis: Moderate malnutrition related to acute disease or injury  As Evidenced by: <75% intake of estimated energy requirements for > 1 month, mild fat loss/moderate muscle wasting, >5% wt loss in 1 month    Nutrition Diagnosis  Patient has Nutrition Diagnosis: Yes  Diagnosis Status (1): New  Nutrition Diagnosis 1: Increased nutrient needs  Related to (1): healing  As Evidenced by (1): diabetic foot ulcer to right great toe       Nutrition Interventions/Recommendations         Nutrition Prescription:  Individualized Nutrition Prescription Provided for : Diabetic, Cardiac diet with ONS        Nutrition Interventions:   Interventions: Meals and snacks, Medical food supplement  Meals and Snacks: General healthful diet, Carbohydrate-modified diet, Mineral-modified diet  Goal: Consumes 3 meals per day  Medical Food Supplement: Commercial beverage  Goal: Glucerna BID (provides 220 kcal, 10 g protein per serving).         Nutrition Education:   Discussed importance of protein with meals and lower carb intake to help with blood glucose control.        Nutrition Monitoring and Evaluation   Food/Nutrient Related History Monitoring  Monitoring and Evaluation Plan: Energy intake, Amount of food, Fluid intake  Energy Intake: Estimated energy intake  Criteria: Pt meets >75% of estimated energy needs  Fluid Intake: Estimated fluid intake  Criteria: Meets >75% of estimated fluid needs  Amount of Food: Estimated amout of food, Medical food intake  Criteria: Pt consumes >75% of meals and supplements    Body Composition/Growth/Weight History  Monitoring and Evaluation Plan: Weight  Weight:  Measured weight  Criteria: Maintains stable weight    Biochemical Data, Medical Tests and Procedures  Monitoring and Evaluation Plan: Glucose/endocrine profile, Electrolyte/renal panel  Electrolyte and Renal Panel: Sodium, Potassium, Phosphorus  Criteria: Electrolytes WNL  Glucose/Endocrine Profile: Glucose, casual  Criteria: BG within desirable range    Nutrition Focused Physical Findings  Monitoring and Evaluation Plan: Skin  Skin: Impaired wound healing  Criteria: Promote wound healing through adequate nutrition       Time Spent/Follow-up Reminder:   Time Spent (min): 30 minutes  Last Date of Nutrition Visit: 03/26/24  Nutrition Follow-Up Needed?: 5-7 days  Follow up Comment: kinsey BID

## 2024-03-26 NOTE — CONSULTS
Inpatient consult to Vascular Surgery  Consult performed by: ESE Navarro-CNP  Consult ordered by: Soraya Kirby DPM  Reason for consult: DM infection with gangrene and decreased digital perfusion.          Reason For Consult  DM infection with gangrene and decreased digital perfusion.     History Of Present Illness  Dionicio Dyer is a 58 y.o. male with PMHx of HTN, HLD, DM2, and medical noncompliance who presented to [facility] ED yesterday complaining of right great toe infection. Hypertensive on arrival with /87. CBC unremarkable. CMP notable for hyponatremia (128) and normal renal and hepatic function. Lactate elevated at 3.0; normalized to 1.3 with IVF. CRP 2.10. XR of toe revealed OM of the proximal and distal phalanges of the great toe with pathologic fractures. He was started on IV antibiotics and admitted to medical floor with consult to podiatry and ID. Pt evaluated by podiatry and will require some level of amputation pending results of MRI (preliminary results showing OM of R distal phalanx with potential involvement of the distal R first metatarsal). PVR obtained revealing no evidence of arterial occlusive disease in bilateral lower extremities. Triphasic waveform to right CFA; monophasic waveform to right PT and DP; ENRICO 1.03/TBI 0.43. Triphasic waveforms to left CFA, PT, and DP; ENRICO 1.07/TBI 0.83. Vascular surgery asked to evaluate decreased digital perfusion in setting of need for amputation.     Pt seen sitting on side of bed. He is afebrile and HDS. Maintaining adequate SpO2 on RA. Reports pain to be controlled. Explains toe infection initially started around Thanksgiving. He was admitted to [facility] for ~1 week. Ultimately completed 3 weeks of IV antibiotics and 90 days of oral antibiotics. Over the past week he has noted progressive pain, color change, and purulent drainage from R great toe again prompting him to come to ED. He denies known hx of arterial disease. Denies  claudication. Does endorse numbness in feet from neuropathy. Hgb A1C 8.9%. BG<200 since admission. On exam, R foot is wrapped with clean/dry Kerlix which was not removed. Bounding R femoral pulse and 1+ palpable PT. Left femoral, PT, and DP easily palpable. Findings discussed with Dr. Spivey. Arterial flow should be sufficient to heal amp and podiatry should proceed as necessary. Please do not hesitate to reach out if there is poor bleeding with amp or poor wound healing. Vascular surgery will sign off. Thank you for this consult.      Past Medical History  He has a past medical history of Diabetes mellitus (CMS/[facility]), Hypertension, and Other specified health status.    Surgical History  He has a past surgical history that includes Other surgical history (11/02/2020) and Joint replacement.     Social History  He reports that he has been smoking cigarettes. He has a 67.50 pack-year smoking history. He does not have any smokeless tobacco history on file. No history on file for alcohol use and drug use.    Family History  Family History   Problem Relation Name Age of Onset    Diabetes type II Mother      Cancer Father          Allergies  Patient has no known allergies.    Review of Systems   Constitutional: Negative.    HENT: Negative.     Cardiovascular: Negative.    Gastrointestinal: Negative.    Musculoskeletal:  Positive for joint swelling.   Skin:  Positive for color change and wound.   Neurological: Negative.         Physical Exam  Vitals and nursing note reviewed.   Constitutional:       General: He is not in acute distress.     Appearance: Normal appearance. He is normal weight.   HENT:      Head: Normocephalic and atraumatic.      Nose: Nose normal.      Mouth/Throat:      Mouth: Mucous membranes are moist.      Pharynx: Oropharynx is clear.   Eyes:      Extraocular Movements: Extraocular movements intact.      Pupils: Pupils are equal, round, and reactive to light.   Cardiovascular:      Rate and Rhythm:  "Normal rate and regular rhythm.      Comments: Bounding bilateral femoral pulses. R PT 1+ palpable. Unable to assess DP due to dressing. L PT and DP easily palpable. ROM intact. Sensation mildly diminished at baseline 2/2 neuropathy.   Pulmonary:      Effort: Pulmonary effort is normal.      Comments: RR regular and unlabored. No adventitious sounds appreciated.   Abdominal:      General: Abdomen is flat. Bowel sounds are normal.      Palpations: Abdomen is soft.   Musculoskeletal:         General: No deformity. Normal range of motion.      Cervical back: Normal range of motion and neck supple.      Right lower leg: No edema.      Left lower leg: No edema.   Skin:     Findings: Lesion present.      Comments: R great toe wound; dressing CDI and was not removed for exam    Neurological:      General: No focal deficit present.      Mental Status: He is alert and oriented to person, place, and time.   Psychiatric:         Mood and Affect: Mood normal.         Behavior: Behavior normal.          Last Recorded Vitals  Blood pressure 150/67, pulse 73, temperature 36.7 °C (98.1 °F), temperature source Temporal, resp. rate 18, height 1.803 m (5' 11\"), weight 76.3 kg (168 lb 3.4 oz), SpO2 96 %.    Relevant Results  Scheduled medications  gabapentin, 300 mg, oral, TID  insulin glargine, 15 Units, subcutaneous, Nightly  insulin lispro, 0-10 Units, subcutaneous, TID with meals  lisinopril, 20 mg, oral, Daily  nicotine, 1 patch, transdermal, Daily  piperacillin-tazobactam, 3.375 g, intravenous, q8h   And  sodium chloride, 10 mL, intravenous, q8h  vancomycin, 1 g, intravenous, q12h      Continuous medications  lactated Ringer's, 75 mL/hr, Last Rate: 75 mL/hr (03/26/24 1016)      PRN medications  PRN medications: acetaminophen, albuterol, dextrose, dextrose, glucagon, glucagon, HYDROcodone-acetaminophen, melatonin, morphine, ondansetron **OR** ondansetron, polyethylene glycol, vancomycin    Results for orders placed or performed " during the hospital encounter of 03/25/24 (from the past 24 hour(s))   POCT GLUCOSE   Result Value Ref Range    POCT Glucose 293 (H) 74 - 99 mg/dL   Light Blue Top   Result Value Ref Range    Extra Tube Hold for add-ons.    Lavender Top   Result Value Ref Range    Extra Tube Hold for add-ons.    PST Top   Result Value Ref Range    Extra Tube Hold for add-ons.    Tissue/Wound Culture/Smear    Specimen: Diabetic Foot Ulcer; Tissue/Biopsy   Result Value Ref Range    Tissue/Wound Culture/Smear Culture in progress     Gram Stain (1+) Rare Polymorphonuclear leukocytes (A)     Gram Stain (3+) Moderate Gram positive cocci (A)    Blood Culture    Specimen: Peripheral Venipuncture; Blood culture   Result Value Ref Range    Blood Culture Loaded on Instrument - Culture in progress    CBC and Auto Differential   Result Value Ref Range    WBC 8.6 4.4 - 11.3 x10*3/uL    nRBC 0.0 0.0 - 0.0 /100 WBCs    RBC 4.92 4.50 - 5.90 x10*6/uL    Hemoglobin 14.2 13.5 - 17.5 g/dL    Hematocrit 42.9 41.0 - 52.0 %    MCV 87 80 - 100 fL    MCH 28.9 26.0 - 34.0 pg    MCHC 33.1 32.0 - 36.0 g/dL    RDW 13.4 11.5 - 14.5 %    Platelets 280 150 - 450 x10*3/uL    Neutrophils % 79.8 40.0 - 80.0 %    Immature Granulocytes %, Automated 0.2 0.0 - 0.9 %    Lymphocytes % 12.6 13.0 - 44.0 %    Monocytes % 5.1 2.0 - 10.0 %    Eosinophils % 1.3 0.0 - 6.0 %    Basophils % 1.0 0.0 - 2.0 %    Neutrophils Absolute 6.86 1.20 - 7.70 x10*3/uL    Immature Granulocytes Absolute, Automated 0.02 0.00 - 0.70 x10*3/uL    Lymphocytes Absolute 1.08 (L) 1.20 - 4.80 x10*3/uL    Monocytes Absolute 0.44 0.10 - 1.00 x10*3/uL    Eosinophils Absolute 0.11 0.00 - 0.70 x10*3/uL    Basophils Absolute 0.09 0.00 - 0.10 x10*3/uL   Comprehensive metabolic panel   Result Value Ref Range    Glucose 279 (H) 74 - 99 mg/dL    Sodium 128 (L) 136 - 145 mmol/L    Potassium 3.8 3.5 - 5.3 mmol/L    Chloride 93 (L) 98 - 107 mmol/L    Bicarbonate 26 21 - 32 mmol/L    Anion Gap 13 10 - 20 mmol/L    Urea  Nitrogen 20 6 - 23 mg/dL    Creatinine 0.92 0.50 - 1.30 mg/dL    eGFR >90 >60 mL/min/1.73m*2    Calcium 9.6 8.6 - 10.3 mg/dL    Albumin 3.8 3.4 - 5.0 g/dL    Alkaline Phosphatase 92 33 - 120 U/L    Total Protein 9.0 (H) 6.4 - 8.2 g/dL    AST 10 9 - 39 U/L    Bilirubin, Total 0.6 0.0 - 1.2 mg/dL    ALT 11 10 - 52 U/L   Lactate   Result Value Ref Range    Lactate 3.0 (H) 0.4 - 2.0 mmol/L   C-Reactive Protein   Result Value Ref Range    C-Reactive Protein 2.10 (H) <1.00 mg/dL   Hemoglobin A1C   Result Value Ref Range    Hemoglobin A1C 8.9 (H) see below %    Estimated Average Glucose 209 Not Established mg/dL   TSH with reflex to Free T4 if abnormal   Result Value Ref Range    Thyroid Stimulating Hormone 1.16 0.44 - 3.98 mIU/L   Blood Culture    Specimen: Peripheral Venipuncture; Blood culture   Result Value Ref Range    Blood Culture Loaded on Instrument - Culture in progress    Lactate   Result Value Ref Range    Lactate 1.3 0.4 - 2.0 mmol/L   POCT GLUCOSE   Result Value Ref Range    POCT Glucose 195 (H) 74 - 99 mg/dL   POCT GLUCOSE   Result Value Ref Range    POCT Glucose 195 (H) 74 - 99 mg/dL   Vascular US ankle brachial index (ENRICO) without exercise   Result Value Ref Range    BSA 1.95 m2   SST TOP   Result Value Ref Range    Extra Tube Hold for add-ons.    Vancomycin   Result Value Ref Range    Vancomycin 17.6 5.0 - 20.0 ug/mL   Magnesium   Result Value Ref Range    Magnesium 1.80 1.60 - 2.40 mg/dL   Basic Metabolic Panel   Result Value Ref Range    Glucose 142 (H) 74 - 99 mg/dL    Sodium 134 (L) 136 - 145 mmol/L    Potassium 3.8 3.5 - 5.3 mmol/L    Chloride 101 98 - 107 mmol/L    Bicarbonate 25 21 - 32 mmol/L    Anion Gap 12 10 - 20 mmol/L    Urea Nitrogen 13 6 - 23 mg/dL    Creatinine 0.80 0.50 - 1.30 mg/dL    eGFR >90 >60 mL/min/1.73m*2    Calcium 8.9 8.6 - 10.3 mg/dL   CBC   Result Value Ref Range    WBC 6.8 4.4 - 11.3 x10*3/uL    nRBC 0.0 0.0 - 0.0 /100 WBCs    RBC 4.51 4.50 - 5.90 x10*6/uL    Hemoglobin 13.0  (L) 13.5 - 17.5 g/dL    Hematocrit 38.4 (L) 41.0 - 52.0 %    MCV 85 80 - 100 fL    MCH 28.8 26.0 - 34.0 pg    MCHC 33.9 32.0 - 36.0 g/dL    RDW 13.1 11.5 - 14.5 %    Platelets 249 150 - 450 x10*3/uL   POCT GLUCOSE   Result Value Ref Range    POCT Glucose 156 (H) 74 - 99 mg/dL   Vancomycin   Result Value Ref Range    Vancomycin 15.5 5.0 - 20.0 ug/mL   POCT GLUCOSE   Result Value Ref Range    POCT Glucose 178 (H) 74 - 99 mg/dL     Vascular US ankle brachial index (ENRICO) without exercise    Result Date: 3/25/2024  Preliminary Cardiology Report             [place] [address]     Tel [phone] Fax [phone]            Preliminary Vascular Lab Report  [facility] US ANKLE BRACHIAL INDEX (ENRICO) WITHOUT EXERCISE  Patient Name:     KATYA Bates Physician:  [id number] Gato Spivey MD Study Date:       3/25/2024       Ordering Provider:  [id number] KEYANNA HENRY MRN/PID:          [id number]        Fellow: Accession#:       [id number]    Technologist:       Sheba Kumari RDMS, T YOB: 1965      Technologist 2: Gender:           M               Encounter#:         [id number] Admission Status: Inpatient       Location Performed: [facility]  Diagnosis/ICD: Peripheral vascular disease, unspecified-I73.9 CPT Codes:     96556 Peripheral artery ENRICO Only  Smoker:            Current. Pertinent History: Claudication, Leg pain and HTN. Grangrene on left 2nd toe.  PRELIMINARY CONCLUSIONS:  Right Lower PVR: No evidence of arterial occlusive disease in the right lower extremity at rest. Decreased digital perfusion noted. Monophasic flow is noted in the right posterior tibial artery and right dorsalis pedis artery. Triphasic flow is noted in the right common femoral artery. Right toe tracing taken on 2nd toe due to big toe wound and bandage. Left Lower PVR: No evidence of arterial occlusive disease in the left lower extremity at rest. Normal digital perfusion  noted. Triphasic flow is noted in the left common femoral artery, left dorsalis pedis artery and left posterior tibial artery. Unable to get left blood pressure due to IV placement.  Imaging & Doppler Findings:  RIGHT Lower PVR                Pressures Ratios Right Posterior Tibial (Ankle) 109 mmHg  0.80 Right Dorsalis Pedis (Ankle)   140 mmHg  1.03 Right Digit (Great Toe)        58 mmHg   0.43   LEFT Lower PVR                Pressures Ratios Left Posterior Tibial (Ankle) 146 mmHg  1.07 Left Dorsalis Pedis (Ankle)   133 mmHg  0.98 Left Digit (Great Toe)        113 mmHg  0.83                      Right Brachial Pressure 136 mmHg   VASCULAR PRELIMINARY REPORT completed by Sheba Kumari RDMS, RVT on 3/25/2024 at 8:22:48 PM  ** Final **     XR toe right 2+ views    Result Date: 3/25/2024  STUDY: Toe Radiographs; 3/25/2024 1:56 PM INDICATION: Right great toe infection. COMPARISON: None Available`. ACCESSION NUMBER(S): [id number] ORDERING CLINICIAN: ANA CRISTINA MORENO TECHNIQUE:  Three view(s) of the right great toe. FINDINGS:  Osteolytic changes of the proximal and distal phalanges of the great toe noted associated with pathologic fractures.  Prominent soft tissue swelling of the great toe associated with medial soft tissue ulceration.    Osteomyelitis of the proximal and distal phalanges of the great toe with pathologic fractures. Signed by Fernando Santos MD        Assessment/Plan     Gangrene of Toe; Diabetic Ulcer; Osteomyelitis   XR R toe with osteolytic changes of proximal and distal phalanges of great toe. Preliminary MRI showing OM of R distal phalanx with potential involvement of the distal R first metatarsal  PVR obtained revealing no evidence of arterial occlusive disease in bilateral lower extremities. Triphasic waveform to right CFA; monophasic waveform to right PT and DP; ENRICO 1.03/TBI 0.43. Triphasic waveforms to left CFA, PT, and DP; ENRICO 1.07/TBI 0.83.   -Findings reviewed with Dr. Spivey  -arterial flow  should be sufficient for wound healing. Recommend Podiatry proceed with amputation. If little bleeding with amp or wound fails to heal can consider peripheral angiogram.  -Wound care per Podiatry  -Antibiotics per ID  -Pain management per primary   -vascular surgery will sign off; please do not hesitate to call back if further assistance is required.     DM2; Diabetic Neuropathy   Hgb A1C 8.9%. BG<200 since admission  -recommend strict BG control in setting of wound   -Lantus 15u HS  -SSI TID-AC  -Home Metformin is on hold   -Hypoglycemic protocol  -Neurontin 300mg TID  -Diabetic diet    I spent 60 minutes in the professional and overall care of this patient.

## 2024-03-27 ENCOUNTER — APPOINTMENT (OUTPATIENT)
Dept: RADIOLOGY | Facility: HOSPITAL | Age: 59
End: 2024-03-27
Payer: COMMERCIAL

## 2024-03-27 LAB
GLUCOSE BLD MANUAL STRIP-MCNC: 138 MG/DL (ref 74–99)
GLUCOSE BLD MANUAL STRIP-MCNC: 147 MG/DL (ref 74–99)
GLUCOSE BLD MANUAL STRIP-MCNC: 157 MG/DL (ref 74–99)
HOLD SPECIMEN: NORMAL
HOLD SPECIMEN: NORMAL
VANCOMYCIN SERPL-MCNC: 25.9 UG/ML (ref 5–20)

## 2024-03-27 PROCEDURE — 99232 SBSQ HOSP IP/OBS MODERATE 35: CPT | Performed by: STUDENT IN AN ORGANIZED HEALTH CARE EDUCATION/TRAINING PROGRAM

## 2024-03-27 PROCEDURE — 1210000001 HC SEMI-PRIVATE ROOM DAILY

## 2024-03-27 PROCEDURE — 36415 COLL VENOUS BLD VENIPUNCTURE: CPT | Performed by: INTERNAL MEDICINE

## 2024-03-27 PROCEDURE — 80202 ASSAY OF VANCOMYCIN: CPT | Performed by: INTERNAL MEDICINE

## 2024-03-27 PROCEDURE — 2500000001 HC RX 250 WO HCPCS SELF ADMINISTERED DRUGS (ALT 637 FOR MEDICARE OP): Performed by: STUDENT IN AN ORGANIZED HEALTH CARE EDUCATION/TRAINING PROGRAM

## 2024-03-27 PROCEDURE — 73130 X-RAY EXAM OF HAND: CPT | Mod: LT

## 2024-03-27 PROCEDURE — 73130 X-RAY EXAM OF HAND: CPT | Mod: LEFT SIDE | Performed by: RADIOLOGY

## 2024-03-27 PROCEDURE — 2500000001 HC RX 250 WO HCPCS SELF ADMINISTERED DRUGS (ALT 637 FOR MEDICARE OP): Performed by: INTERNAL MEDICINE

## 2024-03-27 PROCEDURE — 2500000004 HC RX 250 GENERAL PHARMACY W/ HCPCS (ALT 636 FOR OP/ED): Performed by: INTERNAL MEDICINE

## 2024-03-27 PROCEDURE — S4991 NICOTINE PATCH NONLEGEND: HCPCS | Performed by: INTERNAL MEDICINE

## 2024-03-27 PROCEDURE — 82947 ASSAY GLUCOSE BLOOD QUANT: CPT

## 2024-03-27 PROCEDURE — 2500000002 HC RX 250 W HCPCS SELF ADMINISTERED DRUGS (ALT 637 FOR MEDICARE OP, ALT 636 FOR OP/ED): Performed by: INTERNAL MEDICINE

## 2024-03-27 PROCEDURE — 2500000004 HC RX 250 GENERAL PHARMACY W/ HCPCS (ALT 636 FOR OP/ED)

## 2024-03-27 RX ADMIN — SODIUM CHLORIDE, PRESERVATIVE FREE 10 ML: 5 INJECTION INTRAVENOUS at 05:46

## 2024-03-27 RX ADMIN — PIPERACILLIN SODIUM AND TAZOBACTAM SODIUM 3.38 G: 3; .375 INJECTION, SOLUTION INTRAVENOUS at 22:06

## 2024-03-27 RX ADMIN — LISINOPRIL 20 MG: 20 TABLET ORAL at 08:48

## 2024-03-27 RX ADMIN — MORPHINE SULFATE 2 MG: 2 INJECTION, SOLUTION INTRAMUSCULAR; INTRAVENOUS at 22:06

## 2024-03-27 RX ADMIN — GABAPENTIN 600 MG: 300 CAPSULE ORAL at 16:50

## 2024-03-27 RX ADMIN — INSULIN GLARGINE 15 UNITS: 100 INJECTION, SOLUTION SUBCUTANEOUS at 22:06

## 2024-03-27 RX ADMIN — NICOTINE 1 PATCH: 21 PATCH, EXTENDED RELEASE TRANSDERMAL at 08:48

## 2024-03-27 RX ADMIN — PIPERACILLIN SODIUM AND TAZOBACTAM SODIUM 3.38 G: 3; .375 INJECTION, SOLUTION INTRAVENOUS at 05:46

## 2024-03-27 RX ADMIN — HYDROCODONE BITARTRATE AND ACETAMINOPHEN 1 TABLET: 5; 325 TABLET ORAL at 08:49

## 2024-03-27 RX ADMIN — VANCOMYCIN HYDROCHLORIDE 1 G: 1 INJECTION, SOLUTION INTRAVENOUS at 18:17

## 2024-03-27 RX ADMIN — PIPERACILLIN SODIUM AND TAZOBACTAM SODIUM 3.38 G: 3; .375 INJECTION, SOLUTION INTRAVENOUS at 13:49

## 2024-03-27 RX ADMIN — SODIUM CHLORIDE, PRESERVATIVE FREE 10 ML: 5 INJECTION INTRAVENOUS at 13:50

## 2024-03-27 RX ADMIN — MORPHINE SULFATE 2 MG: 2 INJECTION, SOLUTION INTRAMUSCULAR; INTRAVENOUS at 13:49

## 2024-03-27 RX ADMIN — VANCOMYCIN HYDROCHLORIDE 1 G: 1 INJECTION, SOLUTION INTRAVENOUS at 03:09

## 2024-03-27 RX ADMIN — HYDROCODONE BITARTRATE AND ACETAMINOPHEN 1 TABLET: 5; 325 TABLET ORAL at 18:17

## 2024-03-27 RX ADMIN — GABAPENTIN 600 MG: 300 CAPSULE ORAL at 08:48

## 2024-03-27 RX ADMIN — MORPHINE SULFATE 2 MG: 2 INJECTION, SOLUTION INTRAMUSCULAR; INTRAVENOUS at 03:14

## 2024-03-27 RX ADMIN — GABAPENTIN 600 MG: 300 CAPSULE ORAL at 22:08

## 2024-03-27 RX ADMIN — SODIUM CHLORIDE, PRESERVATIVE FREE 10 ML: 5 INJECTION INTRAVENOUS at 22:07

## 2024-03-27 ASSESSMENT — PAIN - FUNCTIONAL ASSESSMENT
PAIN_FUNCTIONAL_ASSESSMENT: 0-10
PAIN_FUNCTIONAL_ASSESSMENT: 0-10

## 2024-03-27 ASSESSMENT — COGNITIVE AND FUNCTIONAL STATUS - GENERAL
MOBILITY SCORE: 24
DAILY ACTIVITIY SCORE: 24

## 2024-03-27 ASSESSMENT — PAIN DESCRIPTION - LOCATION: LOCATION: TOE (COMMENT WHICH ONE)

## 2024-03-27 ASSESSMENT — PAIN SCALES - GENERAL
PAINLEVEL_OUTOF10: 8
PAINLEVEL_OUTOF10: 4
PAINLEVEL_OUTOF10: 0 - NO PAIN
PAINLEVEL_OUTOF10: 10 - WORST POSSIBLE PAIN
PAINLEVEL_OUTOF10: 9

## 2024-03-27 ASSESSMENT — PAIN DESCRIPTION - ORIENTATION: ORIENTATION: RIGHT

## 2024-03-27 NOTE — PROGRESS NOTES
Rounded with RN caring for patient.  Plan is for possible surgery 3/29,  antibiotics per ID, unknown if will be continued at time of dc.  Plan at present time is home  with [facility]  , care transitions to continue to follow for dc needs.      Jayla Ambrocio RN

## 2024-03-27 NOTE — CONSULTS
Infectious Disease    Patient Name: Dionicio Dyer  Date: 3/27/2024  YOB: 1965  Medical Record Number: [id number]        Chief Complaint   Patient presents with    Wound Infection     R foot         History of Present Illness:    Pt presents with right toe swelling. Last few days worse. Previously treated as outpatient with IV antibiotics  last fall   left hand cellulitis with abscess(MSSA), right diabetic foot infection, finished IV Invanz on 12/17. Says he was on keflex after this , was only supposed to be a couple weeks , but continued taking it for a couple months   Has persistent discoloration of fourth finger with limited movement       He now has foot worsening again, more pain swelling and discoloration    Review of Systems: All other ROS reviewed and are negative other than as stated in HPI            Social History     Tobacco Use    Smoking status: Every Day     Packs/day: 1.50     Years: 45.00     Additional pack years: 0.00     Total pack years: 67.50     Types: Cigarettes         Past Medical History:   Diagnosis Date    Diabetes mellitus (CMS/[facility])     Hypertension     Other specified health status     No pertinent past medical history           Past Surgical History:   Procedure Laterality Date    JOINT REPLACEMENT      OTHER SURGICAL HISTORY  11/02/2020    Knee replacement           Current Facility-Administered Medications:     acetaminophen (Tylenol) tablet 650 mg, 650 mg, oral, q4h PRN, Paul Finley MD    albuterol 90 mcg/actuation inhaler 2 puff, 2 puff, inhalation, q6h PRN, Paul Finley MD    dextrose 50 % injection 12.5 g, 12.5 g, intravenous, q15 min PRN, Paul Finley MD    dextrose 50 % injection 25 g, 25 g, intravenous, q15 min PRN, Paul Finley MD    gabapentin (Neurontin) capsule 600 mg, 600 mg, oral, TID, Navneet Miguel MD, 600 mg at 03/27/24 1650    glucagon (Glucagen) injection 1 mg, 1 mg, intramuscular, q15 min PRN, aPul Finley MD    glucagon (Glucagen)  "injection 1 mg, 1 mg, intramuscular, q15 min PRN, Paul Finley MD    HYDROcodone-acetaminophen (Norco) 5-325 mg per tablet 1 tablet, 1 tablet, oral, q6h PRN, Paul Finley MD, 1 tablet at 03/27/24 0849    insulin glargine (Lantus) injection 15 Units, 15 Units, subcutaneous, Nightly, Paul Finley MD, 15 Units at 03/26/24 2159    insulin lispro (HumaLOG) injection 0-10 Units, 0-10 Units, subcutaneous, TID with meals, Paul Finley MD, 2 Units at 03/26/24 1700    lisinopril tablet 20 mg, 20 mg, oral, Daily, Paul Finley MD, 20 mg at 03/27/24 0848    melatonin tablet 5 mg, 5 mg, oral, Nightly PRN, Paul Finley MD, 5 mg at 03/26/24 2130    morphine injection 2 mg, 2 mg, intravenous, q4h PRN, Paul Finley MD, 2 mg at 03/27/24 1349    nicotine (Nicoderm CQ) 21 mg/24 hr patch 1 patch, 1 patch, transdermal, Daily, Paul Finley MD, 1 patch at 03/27/24 0848    ondansetron (Zofran) tablet 4 mg, 4 mg, oral, q8h PRN **OR** ondansetron (Zofran) injection 4 mg, 4 mg, intravenous, q8h PRN, Paul Finley MD    piperacillin-tazobactam-dextrose (Zosyn) IV 3.375 g, 3.375 g, intravenous, q8h, Last Rate: 12.5 mL/hr at 03/27/24 1349, 3.375 g at 03/27/24 1349 **AND** sodium chloride 0.9 % bolus 10 mL, 10 mL, intravenous, q8h, Emely Mendez, PharmD, Last Rate: 10 mL/hr at 03/27/24 1350, 10 mL at 03/27/24 1350    polyethylene glycol (Glycolax, Miralax) packet 17 g, 17 g, oral, Daily PRN, Paul Finley MD    vancomycin (Vancocin) 1 g in dextrose 5% water 200 mL, 1 g, intravenous, q12h, Claudia Eason, PharmD, Stopped at 03/27/24 0409    vancomycin (Vancocin) pharmacy to dose - pharmacy monitoring, , miscellaneous, Daily PRN, Paul Finley MD     No Known Allergies       Family History   Problem Relation Name Age of Onset    Diabetes type II Mother      Cancer Father           Physical Exam:    Blood pressure 159/85, pulse 72, temperature 37.1 °C (98.8 °F), resp. rate 18, height 1.803 m (5' 11\"), weight 76.3 kg (168 lb 3.4 " oz), SpO2 95 %.  General: Patient appears ok at the present time. NAD  Skin: no new rashes, swollen, erythema 1st digit right foot, darkening , yellow drainage   HEENT:  Neck is supple, No subconjunctival hemorrhages, no oral exudates  Heart: S1 S2  Lungs: clear bilaterally  Abdomen: soft, ND, NTTP,  Back :no CVA tenderness  Extrem: see meliton  Neuro exam: CN II-XII intact  Psych: cooperative    Labs:  I have reviewed all lab results by electronic record, including most recent CBC, metabolic panel, and pertinent abnormalities were addressed from an infectious disease perspective.  Trends are being monitored over time.    Lab Results   Component Value Date    WBC 6.8 03/26/2024    HGB 13.0 (L) 03/26/2024    HCT 38.4 (L) 03/26/2024    MCV 85 03/26/2024     03/26/2024     Lab Results   Component Value Date    GLUCOSE 142 (H) 03/26/2024    CALCIUM 8.9 03/26/2024     (L) 03/26/2024    K 3.8 03/26/2024    CO2 25 03/26/2024     03/26/2024    BUN 13 03/26/2024    CREATININE 0.80 03/26/2024       Radiology:  I have reviewed imaging results per electronic record and most pertinent abnormalities are being addressed from an infectious disease standpoint.            ASSESSMENT:  Problem List Items Addressed This Visit          Endocrine/Metabolic    * (Principal) Diabetic foot ulcer associated with diabetes mellitus due to underlying condition, unspecified laterality, unspecified part of foot, unspecified ulcer stage (CMS/HCC)    Relevant Orders    Vascular US ankle brachial index (ENRICO) without exercise (Completed)    Case Request Operating Room: Right hallux amputation (Completed)       Infectious Diseases    Osteomyelitis of great toe (CMS/HCC) - Primary    Relevant Orders    Case Request Operating Room: Right hallux amputation (Completed)     Other Visit Diagnoses       Peripheral vascular disease, unspecified (CMS/HCC)               Toe likely not salvageable     PLAN:   Await podiatry opinion    Antibiotics  Deep culture was taken

## 2024-03-27 NOTE — PROGRESS NOTES
"Vancomycin Dosing by Pharmacy- FOLLOW UP    Dionicio Dyer is a 58 year old male who Pharmacy has been consulted for vancomycin dosing for diabetic foot infection. Based on the patient's indication and renal status this patient is being dosed based on a goal AUC of 400-600.     Renal function is currently stable.    Current vancomycin dose: 1000 mg given every 12 hours    Estimated vancomycin AUC on current dose: 471 mg/L.hr     Visit Vitals  /85   Pulse 72   Temp 37.1 °C (98.8 °F)   Resp 18        Lab Results   Component Value Date    CREATININE 0.80 03/26/2024    CREATININE 0.92 03/25/2024    CREATININE 0.80 10/04/2019        Patient weight is 76.3 kg    No results found for: \"CULTURE\"     I/O last 3 completed shifts:  In: 1721.3 (22.6 mL/kg) [I.V.:981.3 (12.9 mL/kg); IV Piggyback:740]  Out: 350 (4.6 mL/kg) [Urine:350 (0.1 mL/kg/hr)]  Weight: 76.3 kg   [unfilled]    No results found for: \"PATIENTTEMP\"     Assessment/Plan    Within goal AUC range. Continue current vancomycin regimen.    This dosing regimen is predicted by [employer] to result in the following pharmacokinetic parameters:    Regimen: 1000 mg IV every 12 hours.  Exposure target: AUC24 (range)400-600 mg/L.hr   AUC24,ss: 471 mg/L.hr  Probability of AUC24 > 400: 76 %  Ctrough,ss: 14.7 mg/L  Probability of Ctrough,ss > 20: 17 %  Probability of nephrotoxicity (Lodise JACOBY 2009): 10 %    The next level will be obtained on 3/29/24 with am labs. May be obtained sooner if clinically indicated.   Will continue to monitor renal function daily while on vancomycin and order serum creatinine at least every 48 hours if not already ordered.  Follow for continued vancomycin needs, clinical response, and signs/symptoms of toxicity.       Kristine E Steinert, [facility]           "

## 2024-03-27 NOTE — DISCHARGE INSTRUCTIONS
Patient has a follow up wound care appointment which has already been scheduled for April 5th at 1:30 with Dr. Kirby. Please arrive 20 minutes early for registration. If you can not make this appointment please call [phone] to reschedule.

## 2024-03-28 LAB
GLUCOSE BLD MANUAL STRIP-MCNC: 134 MG/DL (ref 74–99)
GLUCOSE BLD MANUAL STRIP-MCNC: 139 MG/DL (ref 74–99)
GLUCOSE BLD MANUAL STRIP-MCNC: 145 MG/DL (ref 74–99)
GLUCOSE BLD MANUAL STRIP-MCNC: 171 MG/DL (ref 74–99)

## 2024-03-28 PROCEDURE — 2500000002 HC RX 250 W HCPCS SELF ADMINISTERED DRUGS (ALT 637 FOR MEDICARE OP, ALT 636 FOR OP/ED): Performed by: INTERNAL MEDICINE

## 2024-03-28 PROCEDURE — S4991 NICOTINE PATCH NONLEGEND: HCPCS | Performed by: INTERNAL MEDICINE

## 2024-03-28 PROCEDURE — 2500000001 HC RX 250 WO HCPCS SELF ADMINISTERED DRUGS (ALT 637 FOR MEDICARE OP): Performed by: INTERNAL MEDICINE

## 2024-03-28 PROCEDURE — 2500000004 HC RX 250 GENERAL PHARMACY W/ HCPCS (ALT 636 FOR OP/ED)

## 2024-03-28 PROCEDURE — 99232 SBSQ HOSP IP/OBS MODERATE 35: CPT | Performed by: STUDENT IN AN ORGANIZED HEALTH CARE EDUCATION/TRAINING PROGRAM

## 2024-03-28 PROCEDURE — 1210000001 HC SEMI-PRIVATE ROOM DAILY

## 2024-03-28 PROCEDURE — 82947 ASSAY GLUCOSE BLOOD QUANT: CPT

## 2024-03-28 PROCEDURE — 2500000004 HC RX 250 GENERAL PHARMACY W/ HCPCS (ALT 636 FOR OP/ED): Performed by: INTERNAL MEDICINE

## 2024-03-28 PROCEDURE — 2500000001 HC RX 250 WO HCPCS SELF ADMINISTERED DRUGS (ALT 637 FOR MEDICARE OP): Performed by: STUDENT IN AN ORGANIZED HEALTH CARE EDUCATION/TRAINING PROGRAM

## 2024-03-28 RX ADMIN — GABAPENTIN 600 MG: 300 CAPSULE ORAL at 21:48

## 2024-03-28 RX ADMIN — GABAPENTIN 600 MG: 300 CAPSULE ORAL at 15:53

## 2024-03-28 RX ADMIN — PIPERACILLIN SODIUM AND TAZOBACTAM SODIUM 3.38 G: 3; .375 INJECTION, SOLUTION INTRAVENOUS at 21:48

## 2024-03-28 RX ADMIN — VANCOMYCIN HYDROCHLORIDE 1 G: 1 INJECTION, SOLUTION INTRAVENOUS at 03:26

## 2024-03-28 RX ADMIN — MORPHINE SULFATE 2 MG: 2 INJECTION, SOLUTION INTRAMUSCULAR; INTRAVENOUS at 06:26

## 2024-03-28 RX ADMIN — SODIUM CHLORIDE, PRESERVATIVE FREE 10 ML: 5 INJECTION INTRAVENOUS at 21:48

## 2024-03-28 RX ADMIN — GABAPENTIN 600 MG: 300 CAPSULE ORAL at 09:05

## 2024-03-28 RX ADMIN — HYDROCODONE BITARTRATE AND ACETAMINOPHEN 1 TABLET: 5; 325 TABLET ORAL at 16:10

## 2024-03-28 RX ADMIN — HYDROCODONE BITARTRATE AND ACETAMINOPHEN 1 TABLET: 5; 325 TABLET ORAL at 22:38

## 2024-03-28 RX ADMIN — HYDROCODONE BITARTRATE AND ACETAMINOPHEN 1 TABLET: 5; 325 TABLET ORAL at 01:38

## 2024-03-28 RX ADMIN — NICOTINE 1 PATCH: 21 PATCH, EXTENDED RELEASE TRANSDERMAL at 09:05

## 2024-03-28 RX ADMIN — MORPHINE SULFATE 2 MG: 2 INJECTION, SOLUTION INTRAMUSCULAR; INTRAVENOUS at 10:39

## 2024-03-28 RX ADMIN — HYDROCODONE BITARTRATE AND ACETAMINOPHEN 1 TABLET: 5; 325 TABLET ORAL at 09:11

## 2024-03-28 RX ADMIN — Medication 5 MG: at 01:40

## 2024-03-28 RX ADMIN — Medication 5 MG: at 21:48

## 2024-03-28 RX ADMIN — INSULIN GLARGINE 15 UNITS: 100 INJECTION, SOLUTION SUBCUTANEOUS at 21:48

## 2024-03-28 RX ADMIN — PIPERACILLIN SODIUM AND TAZOBACTAM SODIUM 3.38 G: 3; .375 INJECTION, SOLUTION INTRAVENOUS at 05:04

## 2024-03-28 RX ADMIN — PIPERACILLIN SODIUM AND TAZOBACTAM SODIUM 3.38 G: 3; .375 INJECTION, SOLUTION INTRAVENOUS at 13:06

## 2024-03-28 RX ADMIN — LISINOPRIL 20 MG: 20 TABLET ORAL at 09:05

## 2024-03-28 RX ADMIN — VANCOMYCIN HYDROCHLORIDE 1 G: 1 INJECTION, SOLUTION INTRAVENOUS at 18:14

## 2024-03-28 RX ADMIN — SODIUM CHLORIDE, PRESERVATIVE FREE 10 ML: 5 INJECTION INTRAVENOUS at 05:04

## 2024-03-28 RX ADMIN — SODIUM CHLORIDE, PRESERVATIVE FREE 10 ML: 5 INJECTION INTRAVENOUS at 13:00

## 2024-03-28 RX ADMIN — MORPHINE SULFATE 2 MG: 2 INJECTION, SOLUTION INTRAMUSCULAR; INTRAVENOUS at 21:54

## 2024-03-28 RX ADMIN — MORPHINE SULFATE 2 MG: 2 INJECTION, SOLUTION INTRAMUSCULAR; INTRAVENOUS at 15:54

## 2024-03-28 ASSESSMENT — PAIN DESCRIPTION - LOCATION
LOCATION: FOOT
LOCATION: TOE (COMMENT WHICH ONE)
LOCATION: FOOT
LOCATION: TOE (COMMENT WHICH ONE)

## 2024-03-28 ASSESSMENT — COGNITIVE AND FUNCTIONAL STATUS - GENERAL
DAILY ACTIVITIY SCORE: 24
DAILY ACTIVITIY SCORE: 24
MOBILITY SCORE: 24
DAILY ACTIVITIY SCORE: 24

## 2024-03-28 ASSESSMENT — PAIN SCALES - GENERAL
PAINLEVEL_OUTOF10: 3
PAINLEVEL_OUTOF10: 2
PAINLEVEL_OUTOF10: 7
PAINLEVEL_OUTOF10: 6
PAINLEVEL_OUTOF10: 8
PAINLEVEL_OUTOF10: 0 - NO PAIN
PAINLEVEL_OUTOF10: 8
PAINLEVEL_OUTOF10: 7
PAINLEVEL_OUTOF10: 9
PAINLEVEL_OUTOF10: 0 - NO PAIN
PAINLEVEL_OUTOF10: 7
PAINLEVEL_OUTOF10: 8

## 2024-03-28 ASSESSMENT — PAIN DESCRIPTION - ORIENTATION
ORIENTATION: RIGHT

## 2024-03-28 ASSESSMENT — PAIN - FUNCTIONAL ASSESSMENT
PAIN_FUNCTIONAL_ASSESSMENT: 0-10

## 2024-03-28 NOTE — PROGRESS NOTES
?  HISTORY OF PRESENT ILLNESS:    Pt feeling ok. No fevers  Current Medications:    Current Facility-Administered Medications   Medication Dose Route Frequency Provider Last Rate Last Admin    acetaminophen (Tylenol) tablet 650 mg  650 mg oral q4h PRN Paul Finley MD        albuterol 90 mcg/actuation inhaler 2 puff  2 puff inhalation q6h PRN Paul Finley MD        dextrose 50 % injection 12.5 g  12.5 g intravenous q15 min PRN Paul Finley MD        dextrose 50 % injection 25 g  25 g intravenous q15 min PRN Paul Finley MD        gabapentin (Neurontin) capsule 600 mg  600 mg oral TID Navneet Miguel MD   600 mg at 03/27/24 2208    glucagon (Glucagen) injection 1 mg  1 mg intramuscular q15 min PRN Paul Finley MD        glucagon (Glucagen) injection 1 mg  1 mg intramuscular q15 min PRN Paul Finley MD        HYDROcodone-acetaminophen (Norco) 5-325 mg per tablet 1 tablet  1 tablet oral q6h PRN Paul Finley MD   1 tablet at 03/28/24 0138    insulin glargine (Lantus) injection 15 Units  15 Units subcutaneous Nightly Paul Finley MD   15 Units at 03/27/24 2206    insulin lispro (HumaLOG) injection 0-10 Units  0-10 Units subcutaneous TID with meals Paul Finley MD   2 Units at 03/26/24 1700    lisinopril tablet 20 mg  20 mg oral Daily Paul Finley MD   20 mg at 03/27/24 0848    melatonin tablet 5 mg  5 mg oral Nightly PRN Paul Finley MD   5 mg at 03/28/24 0140    morphine injection 2 mg  2 mg intravenous q4h PRN Paul Finley MD   2 mg at 03/27/24 2206    nicotine (Nicoderm CQ) 21 mg/24 hr patch 1 patch  1 patch transdermal Daily Paul Finley MD   1 patch at 03/27/24 0848    ondansetron (Zofran) tablet 4 mg  4 mg oral q8h PRN Paul Finley MD        Or    ondansetron (Zofran) injection 4 mg  4 mg intravenous q8h PRN Paul Finley MD        piperacillin-tazobactam-dextrose (Zosyn) IV 3.375 g  3.375 g intravenous q8h Emely Mendez, PharmD   Stopped at 03/28/24 0206    And    sodium chloride 0.9  "% bolus 10 mL  10 mL intravenous q8h Emely Mendez, PharmD   Stopped at 03/27/24 2307    polyethylene glycol (Glycolax, Miralax) packet 17 g  17 g oral Daily PRN Paul Finley MD        vancomycin (Vancocin) 1 g in dextrose 5% water 200 mL  1 g intravenous q12h Claudia Eason PharmD 200 mL/hr at 03/28/24 0326 1 g at 03/28/24 0326    vancomycin (Vancocin) pharmacy to dose - pharmacy monitoring   miscellaneous Daily PRN Paul Finley MD            Allergies:  No Known Allergies       Review of Systems  14 system review is negative other than HPI     /70 (BP Location: Right arm, Patient Position: Lying)   Pulse 58   Temp 37.2 °C (99 °F) (Temporal)   Resp 16   Ht 1.803 m (5' 11\")   Wt 76.3 kg (168 lb 3.4 oz)   SpO2 95%   BMI 23.46 kg/m²    Physical Exam:  General: Patient appears ok at the present time. NAD  Skin: no new rashes  HEENT:  Neck is supple, No subconjunctival hemorrhages, no oral exudates  Heart: S1 S2  Lungs: diminished bases  Abdomen: soft, ND, NTTP,  Extrem:toe dressed           DATA:    .  Lab Results   Component Value Date    WBC 6.8 03/26/2024    HGB 13.0 (L) 03/26/2024    HCT 38.4 (L) 03/26/2024    MCV 85 03/26/2024     03/26/2024     Results from last 7 days   Lab Units 03/26/24  0541 03/25/24  1316   SODIUM mmol/L 134* 128*   POTASSIUM mmol/L 3.8 3.8   CHLORIDE mmol/L 101 93*   CO2 mmol/L 25 26   BUN mg/dL 13 20   CREATININE mg/dL 0.80 0.92   CALCIUM mg/dL 8.9 9.6   PROTEIN TOTAL g/dL  --  9.0*   BILIRUBIN TOTAL mg/dL  --  0.6   ALK PHOS U/L  --  92   ALT U/L  --  11   AST U/L  --  10   GLUCOSE mg/dL 142* 279*   Susceptibility data from last 90 days.  Collected Specimen Info Organism   03/25/24 Tissue/Biopsy from Diabetic Foot Ulcer Haemophilus parainfluenzae     Streptococcus anginosus group     Mixed Anaerobic Bacteria   Susceptibility data from last 90 days.  Collected Specimen Info Organism   03/25/24 Tissue/Biopsy from Diabetic Foot Ulcer Haemophilus parainfluenzae    "  Streptococcus anginosus group     Mixed Anaerobic Bacteria           IMPRESSION:        Problem List Items Addressed This Visit          Endocrine/Metabolic    * (Principal) Diabetic foot ulcer associated with diabetes mellitus due to underlying condition, unspecified laterality, unspecified part of foot, unspecified ulcer stage (CMS/[facility])    Relevant Orders    Vascular US ankle brachial index ([place]) without exercise (Completed)    Case Request Operating Room: Right hallux amputation (Completed)       Infectious Diseases    Osteomyelitis of great toe (CMS/[facility]) - Primary    Relevant Orders    Case Request Operating Room: Right hallux amputation (Completed)     Other Visit Diagnoses       Peripheral vascular disease, unspecified (CMS/[facility])                   Toe likely not salvageable      PLAN:   Await podiatry  intervention  Antibiotics  Deep culture was taken    Pt concern about retained gauze in left hand from previously, xray ordered    Allan Sullivan MD

## 2024-03-28 NOTE — PROGRESS NOTES
Dionicio Dyer is a 58 y.o. male on day 2 of admission presenting with Diabetic foot ulcer associated with diabetes mellitus due to underlying condition, unspecified laterality, unspecified part of foot, unspecified ulcer stage (CMS/HCC).    Subjective   Patient examined and evaluated at bedside with dressing intact.  States there is no increase in pain to his right great toe.  He denies any constitutional symptoms at this time.  No other pedal complaints.        Physical Exam    Objective     REVIEW OF SYSTEMS    All negative except what is listed in the HPI     Patient is AOx3 and in no acute distress. Patient is alert and cooperative. Sitting comfortably in bed with dressing clean, dry and intact.      Vascular: Faintly palpable DP/PT pulses B/L. No pitting edema noted B/L. Hair growth absent B/L. CFT sluggish to B/L to distal digits.  Temperature is warm to cool from tibial tuberosity to distal digits B/L.  Distal digits are cooler to palpation bilaterally.  No lymphatic streaking noted B/L.  Increase in edema to right hallux.     Musculoskeletal: Gross active and passive ROM intact to age and activity level. Moves all extremities spontaneously. No pain to palpation at feet B/L except for the circumferential right hallux.     Neurological: Intact light touch sensation B/L. Pain stimuli diminished B/L.  Numbness to bilateral lower extremities.  Significant decrease in protective sensation with absence of protective sensation at the distal digits.     Dermatologic: Significant xerosis noted to bilateral lower extremities.  At distal left second and third digit callus with no evidence of infection.  Right hallux is erythematous and edematous with purulent foul-smelling drainage and necrotic areas of tissue to the circumferential digit.  Wound probes down deep to bone.     Last Recorded Vitals  Blood pressure 136/81, pulse 60, temperature 37 °C (98.6 °F), temperature source Temporal, resp. rate 18, height 1.803 m  "(5' 11\"), weight 76.3 kg (168 lb 3.4 oz), SpO2 97 %.    Intake/Output last 3 Shifts:  I/O last 3 completed shifts:  In: 670 (8.8 mL/kg) [IV Piggyback:670]  Out: - (0 mL/kg)   Weight: 76.3 kg     Relevant Results  Results for orders placed or performed during the hospital encounter of 03/25/24 (from the past 24 hour(s))   POCT GLUCOSE   Result Value Ref Range    POCT Glucose 157 (H) 74 - 99 mg/dL   POCT GLUCOSE   Result Value Ref Range    POCT Glucose 139 (H) 74 - 99 mg/dL   POCT GLUCOSE   Result Value Ref Range    POCT Glucose 145 (H) 74 - 99 mg/dL   POCT GLUCOSE   Result Value Ref Range    POCT Glucose 134 (H) 74 - 99 mg/dL            Assessment/Plan   Principal Problem:    Diabetic foot ulcer associated with diabetes mellitus due to underlying condition, unspecified laterality, unspecified part of foot, unspecified ulcer stage (CMS/[facility])  Active Problems:    Osteomyelitis of great toe (CMS/HCC)    # Wet gangrene right hallux  # Osteomyelitis right hallux  # Uncontrolled diabetes with peripheral neuropathy  - Patient was seen and evaluated; all findings were discussed and all questions were answered to patient's satisfaction.  - Charts, labs, vitals and imaging all reviewed.   - Imaging: X-ray shows evidence of osteomyelitis of right hallux.  MRI -IMPRESSION:  Large air-filled soft tissue ulcer at the medial aspect of the great  toe with extension into the distal aspect of the 1st proximal  phalanx. There is also bone destruction at the distal tuft of the  great toe. There is diffuse bone marrow edema and enhancement in the  1st proximal and distal phalanx. These findings are consistent with  osteomyelitis.    There is diffuse cellulitis of the great toe extending around the 1st  and 2nd metatarsal. Nonspecific edema and fluid is also identified in  the dorsal subcutaneous fat and within the deep soft tissues of the  mid to forefoot. There is no sign of abscess.    Subchondral bone marrow edema in the 1st metatarsal " head and neck and  sesamoid bones. These findings may be degenerative in nature though  early changes of osteomyelitis can not be excluded given the adjacent  soft tissue and bony changes.    Mild heterogenous bone marrow edema and enhancement in the 5th  metatarsal head, more likely degenerative.    The toes are limited by fat saturation failure. There are no  additional sites of diminished T1 weighted signal however.    Multifocal midfoot arthrosis.    - Labs: No leukocytosis  - PVRs: Right Lower PVR: No evidence of arterial occlusive disease in the right lower extremity at rest. Decreased digital perfusion noted. Monophasic flow is noted in the right posterior tibial artery and right dorsalis pedis artery. Triphasic flow is noted in the right common femoral artery. Right toe tracing taken on 2nd toe due to big toe wound and bandage.  Left Lower PVR: No evidence of arterial occlusive disease in the left lower extremity at rest. Normal digital perfusion noted. Triphasic flow is noted in the left common femoral artery, left dorsalis pedis artery and left posterior tibial artery. Unable to get left blood pressure due to IV placement.     Imaging & Doppler Findings:     RIGHT Lower PVR                Pressures Ratios  Right Posterior Tibial (Ankle) 109 mmHg  0.80  Right Dorsalis Pedis (Ankle)   140 mmHg  1.03  Right Digit (Great Toe)        58 mmHg   0.43           LEFT Lower PVR                Pressures Ratios  Left Posterior Tibial (Ankle) 146 mmHg  1.07  Left Dorsalis Pedis (Ankle)   133 mmHg  0.98  Left Digit (Great Toe)        113 mmHg  0.83     - Wound culture:      Abnormal   (1+) Rare Polymorphonuclear leukocytes       (3+) Moderate Gram positive cocci         - Blood culture: Pending     Plan:  - Abx: Per infectious disease  - Pain Regimen: Medicine  -Discussed with patient that he will most likely require amputation of the right hallux.  - Discussed with patient that the preliminary read for the MRI shows  osteomyelitis of the right distal phalanx with potential involvement of the distal right first metatarsal.  Will await final read for surgical planning.    -Vascular reviewed [place]- cleared for procedure from a vascular standpoint.  If patient does not bleed well during surgery, angiogram may be needed.   -Plan for surgery on Monday 4/1 for right hallux amputation with biopsy of 1st metatarsal head and potential partial 1st ray resection.   - Right toe dressed with Betadine, 4 x 4, ABD and Kerlix secured with tape.  Dressing orders placed and nursing staff may change daily.  Thank you  - Please of free to reach out any questions or concerns.  - Patient may be weightbearing as tolerated.       Soraya Kirby DPM        A total of 30 minutes was spent in formulation of this note, review of charts, labs,  imaging with a minimum of 50% of the time spent in face to face with with the patient.  All questions and concerns were answered to the patients satisfaction.         Off note, use of popADon dictation system was used to dictate this document.  All proper spelling and grammatical errors may not have been corrected prior to final submission.          Soraya Kirby DPM

## 2024-03-28 NOTE — CARE PLAN
The patient's goals for the shift include pt will be free from pain    The clinical goals for the shift include Pt will have decrease in pain via pain scale by end of this shift

## 2024-03-29 ENCOUNTER — APPOINTMENT (OUTPATIENT)
Dept: CARDIOLOGY | Facility: HOSPITAL | Age: 59
End: 2024-03-29
Payer: COMMERCIAL

## 2024-03-29 LAB
ATRIAL RATE: 58 BPM
B-LACTAMASE ORGANISM ISLT: NEGATIVE
B-LACTAMASE ORGANISM ISLT: POSITIVE
BACTERIA BLD CULT: NORMAL
BACTERIA BLD CULT: NORMAL
BACTERIA SPEC CULT: ABNORMAL
GLUCOSE BLD MANUAL STRIP-MCNC: 121 MG/DL (ref 74–99)
GLUCOSE BLD MANUAL STRIP-MCNC: 180 MG/DL (ref 74–99)
GLUCOSE BLD MANUAL STRIP-MCNC: 183 MG/DL (ref 74–99)
GLUCOSE BLD MANUAL STRIP-MCNC: 189 MG/DL (ref 74–99)
GRAM STN SPEC: ABNORMAL
GRAM STN SPEC: ABNORMAL
HOLD SPECIMEN: NORMAL
HOLD SPECIMEN: NORMAL
P AXIS: 76 DEGREES
P OFFSET: 208 MS
P ONSET: 146 MS
PR INTERVAL: 160 MS
Q ONSET: 226 MS
QRS COUNT: 9 BEATS
QRS DURATION: 94 MS
QT INTERVAL: 418 MS
QTC CALCULATION(BAZETT): 410 MS
QTC FREDERICIA: 413 MS
R AXIS: 68 DEGREES
T AXIS: 78 DEGREES
T OFFSET: 435 MS
VANCOMYCIN SERPL-MCNC: 15.7 UG/ML (ref 5–20)
VENTRICULAR RATE: 58 BPM

## 2024-03-29 PROCEDURE — 36415 COLL VENOUS BLD VENIPUNCTURE: CPT

## 2024-03-29 PROCEDURE — S4991 NICOTINE PATCH NONLEGEND: HCPCS | Performed by: INTERNAL MEDICINE

## 2024-03-29 PROCEDURE — 80202 ASSAY OF VANCOMYCIN: CPT

## 2024-03-29 PROCEDURE — 2500000004 HC RX 250 GENERAL PHARMACY W/ HCPCS (ALT 636 FOR OP/ED)

## 2024-03-29 PROCEDURE — 2500000004 HC RX 250 GENERAL PHARMACY W/ HCPCS (ALT 636 FOR OP/ED): Performed by: INTERNAL MEDICINE

## 2024-03-29 PROCEDURE — 2500000001 HC RX 250 WO HCPCS SELF ADMINISTERED DRUGS (ALT 637 FOR MEDICARE OP): Performed by: STUDENT IN AN ORGANIZED HEALTH CARE EDUCATION/TRAINING PROGRAM

## 2024-03-29 PROCEDURE — 2500000001 HC RX 250 WO HCPCS SELF ADMINISTERED DRUGS (ALT 637 FOR MEDICARE OP): Performed by: INTERNAL MEDICINE

## 2024-03-29 PROCEDURE — 93010 ELECTROCARDIOGRAM REPORT: CPT | Performed by: INTERNAL MEDICINE

## 2024-03-29 PROCEDURE — 99232 SBSQ HOSP IP/OBS MODERATE 35: CPT | Performed by: STUDENT IN AN ORGANIZED HEALTH CARE EDUCATION/TRAINING PROGRAM

## 2024-03-29 PROCEDURE — 93005 ELECTROCARDIOGRAM TRACING: CPT

## 2024-03-29 PROCEDURE — 82947 ASSAY GLUCOSE BLOOD QUANT: CPT

## 2024-03-29 PROCEDURE — 2500000002 HC RX 250 W HCPCS SELF ADMINISTERED DRUGS (ALT 637 FOR MEDICARE OP, ALT 636 FOR OP/ED): Performed by: INTERNAL MEDICINE

## 2024-03-29 PROCEDURE — 1210000001 HC SEMI-PRIVATE ROOM DAILY

## 2024-03-29 PROCEDURE — 93922 UPR/L XTREMITY ART 2 LEVELS: CPT | Performed by: SURGERY

## 2024-03-29 RX ADMIN — HYDROCODONE BITARTRATE AND ACETAMINOPHEN 1 TABLET: 5; 325 TABLET ORAL at 10:50

## 2024-03-29 RX ADMIN — VANCOMYCIN HYDROCHLORIDE 1 G: 1 INJECTION, SOLUTION INTRAVENOUS at 18:03

## 2024-03-29 RX ADMIN — INSULIN GLARGINE 15 UNITS: 100 INJECTION, SOLUTION SUBCUTANEOUS at 20:03

## 2024-03-29 RX ADMIN — MORPHINE SULFATE 2 MG: 2 INJECTION, SOLUTION INTRAMUSCULAR; INTRAVENOUS at 14:08

## 2024-03-29 RX ADMIN — SODIUM CHLORIDE, PRESERVATIVE FREE 10 ML: 5 INJECTION INTRAVENOUS at 07:02

## 2024-03-29 RX ADMIN — SODIUM CHLORIDE, PRESERVATIVE FREE 10 ML: 5 INJECTION INTRAVENOUS at 14:09

## 2024-03-29 RX ADMIN — NICOTINE 1 PATCH: 21 PATCH, EXTENDED RELEASE TRANSDERMAL at 09:15

## 2024-03-29 RX ADMIN — VANCOMYCIN HYDROCHLORIDE 1 G: 1 INJECTION, SOLUTION INTRAVENOUS at 05:51

## 2024-03-29 RX ADMIN — MORPHINE SULFATE 2 MG: 2 INJECTION, SOLUTION INTRAMUSCULAR; INTRAVENOUS at 18:15

## 2024-03-29 RX ADMIN — HYDROCODONE BITARTRATE AND ACETAMINOPHEN 1 TABLET: 5; 325 TABLET ORAL at 16:59

## 2024-03-29 RX ADMIN — MORPHINE SULFATE 2 MG: 2 INJECTION, SOLUTION INTRAMUSCULAR; INTRAVENOUS at 06:36

## 2024-03-29 RX ADMIN — LISINOPRIL 20 MG: 20 TABLET ORAL at 09:15

## 2024-03-29 RX ADMIN — HYDROCODONE BITARTRATE AND ACETAMINOPHEN 1 TABLET: 5; 325 TABLET ORAL at 04:46

## 2024-03-29 RX ADMIN — GABAPENTIN 600 MG: 300 CAPSULE ORAL at 15:48

## 2024-03-29 RX ADMIN — GABAPENTIN 600 MG: 300 CAPSULE ORAL at 20:03

## 2024-03-29 RX ADMIN — MORPHINE SULFATE 2 MG: 2 INJECTION, SOLUTION INTRAMUSCULAR; INTRAVENOUS at 02:27

## 2024-03-29 RX ADMIN — PIPERACILLIN SODIUM AND TAZOBACTAM SODIUM 3.38 G: 3; .375 INJECTION, SOLUTION INTRAVENOUS at 07:02

## 2024-03-29 RX ADMIN — PIPERACILLIN SODIUM AND TAZOBACTAM SODIUM 3.38 G: 3; .375 INJECTION, SOLUTION INTRAVENOUS at 20:03

## 2024-03-29 RX ADMIN — INSULIN LISPRO 2 UNITS: 100 INJECTION, SOLUTION INTRAVENOUS; SUBCUTANEOUS at 16:41

## 2024-03-29 RX ADMIN — GABAPENTIN 600 MG: 300 CAPSULE ORAL at 09:15

## 2024-03-29 RX ADMIN — SODIUM CHLORIDE, PRESERVATIVE FREE 10 ML: 5 INJECTION INTRAVENOUS at 20:03

## 2024-03-29 RX ADMIN — MORPHINE SULFATE 2 MG: 2 INJECTION, SOLUTION INTRAMUSCULAR; INTRAVENOUS at 22:54

## 2024-03-29 RX ADMIN — PIPERACILLIN SODIUM AND TAZOBACTAM SODIUM 3.38 G: 3; .375 INJECTION, SOLUTION INTRAVENOUS at 14:08

## 2024-03-29 RX ADMIN — INSULIN LISPRO 2 UNITS: 100 INJECTION, SOLUTION INTRAVENOUS; SUBCUTANEOUS at 06:36

## 2024-03-29 ASSESSMENT — PAIN SCALES - GENERAL
PAINLEVEL_OUTOF10: 7
PAINLEVEL_OUTOF10: 4
PAINLEVEL_OUTOF10: 8
PAINLEVEL_OUTOF10: 8
PAINLEVEL_OUTOF10: 6
PAINLEVEL_OUTOF10: 3
PAINLEVEL_OUTOF10: 4
PAINLEVEL_OUTOF10: 6
PAINLEVEL_OUTOF10: 8
PAINLEVEL_OUTOF10: 4

## 2024-03-29 ASSESSMENT — COGNITIVE AND FUNCTIONAL STATUS - GENERAL
DAILY ACTIVITIY SCORE: 24
MOBILITY SCORE: 24
MOBILITY SCORE: 24
DAILY ACTIVITIY SCORE: 24

## 2024-03-29 ASSESSMENT — PAIN DESCRIPTION - LOCATION
LOCATION: FOOT

## 2024-03-29 ASSESSMENT — ENCOUNTER SYMPTOMS
GASTROINTESTINAL NEGATIVE: 1
CARDIOVASCULAR NEGATIVE: 1
RESPIRATORY NEGATIVE: 1
WOUND: 1

## 2024-03-29 ASSESSMENT — PAIN - FUNCTIONAL ASSESSMENT
PAIN_FUNCTIONAL_ASSESSMENT: 0-10

## 2024-03-29 ASSESSMENT — PAIN DESCRIPTION - ORIENTATION
ORIENTATION: RIGHT

## 2024-03-29 NOTE — PROGRESS NOTES
03/26/24 1032   Discharge Planning   Living Arrangements Spouse/significant other   Support Systems Spouse/significant other   Assistance Needed None, Ind ADLS and IADLS no AD, drives, works, denies falls   Type of Residence Private residence  (3 level home, bedroom/bathroom upstairs and bathroom 1st floor)   Number of Stairs to Enter Residence 3   Number of Stairs Within Residence 30   Do you have animals or pets at home? Yes   Type of Animals or Pets 3 dogs ([place])   Home or Post Acute Services In home services   Type of Home Care Services Home nursing visits   Patient expects to be discharged to: Home with [facility]   Does the patient need discharge transport arranged? No   Financial Resource Strain   How hard is it for you to pay for the very basics like food, housing, medical care, and heating? Not hard   Housing Stability   In the last 12 months, was there a time when you were not able to pay the mortgage or rent on time? N   In the last 12 months, how many places have you lived? 1   In the last 12 months, was there a time when you did not have a steady place to sleep or slept in a shelter (including now)? N   Transportation Needs   In the past 12 months, has lack of transportation kept you from medical appointments or from getting medications? no   In the past 12 months, has lack of transportation kept you from meetings, work, or from getting things needed for daily living? No   Patient Choice   Provider Choice list and CMS website (https://medicare.gov/care-compare#search) for post-acute Quality and Resource Measure Data were provided and reviewed with: Patient   Patient / Family choosing to utilize agency / facility established prior to hospitalization No     Per Podiatry, Plan for surgery on Monday 4/1 for right hallux amputation with biopsy of 1st metatarsal head and potential partial 1st ray resection. CT team will monitor case for progression and DC planning.

## 2024-03-29 NOTE — PROGRESS NOTES
"Vancomycin Dosing by Pharmacy- FOLLOW UP    Dionicio Dyer is a 58 y.o. year old male who Pharmacy has been consulted for vancomycin dosing for cellulitis, skin and soft tissue. Based on the patient's indication and renal status this patient is being dosed based on a goal AUC of 400-600.     Renal function is currently stable.    Current vancomycin dose: 1000 mg given every 12 hours    Estimated vancomycin AUC on current dose: 460 mg/L.hr     Visit Vitals  /81   Pulse 55   Temp 36.5 °C (97.7 °F)   Resp 18        Lab Results   Component Value Date    CREATININE 0.80 03/26/2024    CREATININE 0.92 03/25/2024    CREATININE 0.80 10/04/2019        Patient weight is No results found for: \"PTWEIGHT\"    No results found for: \"CULTURE\"     I/O last 3 completed shifts:  In: 860 (11.3 mL/kg) [IV Piggyback:860]  Out: - (0 mL/kg)   Weight: 76.3 kg   [unfilled]    No results found for: \"PATIENTTEMP\"     Assessment/Plan    Within goal AUC range. Continue current vancomycin regimen.    This dosing regimen is predicted by [employer] to result in the following pharmacokinetic parameters:  Regimen: 1000 mg IV every 12 hours.  Start time: 17:51 on 03/29/2024  Exposure target: AUC24 (range)400-600 mg/L.hr   AUC24,ss: 460 mg/L.hr  Probability of AUC24 > 400: 89 %  Ctrough,ss: 13.9 mg/L  Probability of Ctrough,ss > 20: 2 %  Probability of nephrotoxicity (Lodise JACOBY 2009): 9 %      The next level will be obtained on 4/1 at 0500. May be obtained sooner if clinically indicated.   Will continue to monitor renal function daily while on vancomycin and order serum creatinine at least every 48 hours if not already ordered.  Follow for continued vancomycin needs, clinical response, and signs/symptoms of toxicity.       Nasreen Lynch, [facility]           "

## 2024-03-29 NOTE — PROGRESS NOTES
Primary MD: Callum Ruano MD    Reason For Consult  Diabetic foot infection  Right great toe osteomyelitis        History Of Present Illness  Dionicio Dyer is a 58 y.o. male      Right great toe osteomyelitis  Diabetic right foot infection status postdebridement  Cultures showing haemophilus mixed anaerobic bacteria        MRI -IMPRESSION:  Large air-filled soft tissue ulcer at the medial aspect of the great  toe with extension into the distal aspect of the 1st proximal  phalanx. There is also bone destruction at the distal tuft of the  great toe. There is diffuse bone marrow edema and enhancement in the  1st proximal and distal phalanx. These findings are consistent with  osteomyelitis.                   Past Medical History  He has a past medical history of Diabetes mellitus (CMS/[facility]), Hypertension, and Other specified health status.    Surgical History  He has a past surgical history that includes Other surgical history (11/02/2020) and Joint replacement.     Social History  He reports that he has been smoking cigarettes. He has a 67.50 pack-year smoking history. He does not have any smokeless tobacco history on file. No history on file for alcohol use and drug use.        Family History  Family History   Problem Relation Name Age of Onset    Diabetes type II Mother      Cancer Father       Allergies  Patient has no known allergies.       There is no immunization history on file for this patient.  Review of Systems   Respiratory: Negative.     Cardiovascular: Negative.    Gastrointestinal: Negative.    Skin:  Positive for wound.        Physical Exam  Cardiovascular:      Heart sounds: Normal heart sounds. No murmur heard.  Pulmonary:      Effort: Pulmonary effort is normal. No respiratory distress.      Breath sounds: Normal breath sounds. No wheezing, rhonchi or rales.   Abdominal:      General: Abdomen is flat. Bowel sounds are normal. There is no distension.      Tenderness: There is no abdominal  "tenderness. There is no guarding.   Musculoskeletal:      Comments: Right great toe is necrotic          Range of Vitals (last 24 hours)  Heart Rate:  [55-67]   Temp:  [36.5 °C (97.7 °F)-37.2 °C (99 °F)]   Resp:  [14]   BP: (160-169)/(74-81)   SpO2:  [94 %-96 %]     Relevant Results              CrCl cannot be calculated (Patient's most recent lab result is older than the maximum 3 days allowed.).  C-Reactive Protein   Date/Time Value Ref Range Status   03/25/2024 01:16 PM 2.10 (H) <1.00 mg/dL Final     CRP   Date/Time Value Ref Range Status   10/04/2019 08:58 AM 0.48 mg/dL Final     Comment:     REF VALUE  < 1.00       Sedimentation Rate   Date/Time Value Ref Range Status   10/04/2019 08:58 AM 11 0 - 20 mm/h Final     No results found for: \"HIV1X2\", \"HIVCONF\", \"MEEGPG2VZ\"  No results found for: \"HCVPCRQUANT\"  Cultures  Susceptibility data from last 14 days.  Collected Specimen Info Organism Ceftriaxone Penicillin Tetracycline Vancomycin   03/25/24 Tissue/Biopsy from Diabetic Foot Ulcer Streptococcus anginosus group S S R S     Haemophilus parainfluenzae         Mixed Anaerobic Bacteria              Assessment/Plan     Diabetic foot infection  Right great toe osteomyelitis    On Zosyn vancomycin    To have amputation of right great toe      "

## 2024-03-30 LAB
ALBUMIN SERPL BCP-MCNC: 3.5 G/DL (ref 3.4–5)
ALP SERPL-CCNC: 62 U/L (ref 33–120)
ALT SERPL W P-5'-P-CCNC: 11 U/L (ref 10–52)
ANION GAP SERPL CALC-SCNC: 11 MMOL/L (ref 10–20)
AST SERPL W P-5'-P-CCNC: 11 U/L (ref 9–39)
BILIRUB SERPL-MCNC: 0.4 MG/DL (ref 0–1.2)
BUN SERPL-MCNC: 14 MG/DL (ref 6–23)
CALCIUM SERPL-MCNC: 8.9 MG/DL (ref 8.6–10.3)
CHLORIDE SERPL-SCNC: 108 MMOL/L (ref 98–107)
CO2 SERPL-SCNC: 24 MMOL/L (ref 21–32)
CREAT SERPL-MCNC: 0.94 MG/DL (ref 0.5–1.3)
EGFRCR SERPLBLD CKD-EPI 2021: >90 ML/MIN/1.73M*2
ERYTHROCYTE [DISTWIDTH] IN BLOOD BY AUTOMATED COUNT: 13.2 % (ref 11.5–14.5)
GLUCOSE BLD MANUAL STRIP-MCNC: 131 MG/DL (ref 74–99)
GLUCOSE BLD MANUAL STRIP-MCNC: 134 MG/DL (ref 74–99)
GLUCOSE BLD MANUAL STRIP-MCNC: 150 MG/DL (ref 74–99)
GLUCOSE BLD MANUAL STRIP-MCNC: 169 MG/DL (ref 74–99)
GLUCOSE SERPL-MCNC: 139 MG/DL (ref 74–99)
HCT VFR BLD AUTO: 41.6 % (ref 41–52)
HGB BLD-MCNC: 14.1 G/DL (ref 13.5–17.5)
HOLD SPECIMEN: NORMAL
MCH RBC QN AUTO: 29.1 PG (ref 26–34)
MCHC RBC AUTO-ENTMCNC: 33.9 G/DL (ref 32–36)
MCV RBC AUTO: 86 FL (ref 80–100)
NRBC BLD-RTO: 0 /100 WBCS (ref 0–0)
PLATELET # BLD AUTO: 223 X10*3/UL (ref 150–450)
POTASSIUM SERPL-SCNC: 3.7 MMOL/L (ref 3.5–5.3)
PROT SERPL-MCNC: 7.6 G/DL (ref 6.4–8.2)
RBC # BLD AUTO: 4.84 X10*6/UL (ref 4.5–5.9)
SODIUM SERPL-SCNC: 139 MMOL/L (ref 136–145)
WBC # BLD AUTO: 10 X10*3/UL (ref 4.4–11.3)

## 2024-03-30 PROCEDURE — 2500000001 HC RX 250 WO HCPCS SELF ADMINISTERED DRUGS (ALT 637 FOR MEDICARE OP): Performed by: STUDENT IN AN ORGANIZED HEALTH CARE EDUCATION/TRAINING PROGRAM

## 2024-03-30 PROCEDURE — 2500000004 HC RX 250 GENERAL PHARMACY W/ HCPCS (ALT 636 FOR OP/ED)

## 2024-03-30 PROCEDURE — 2500000002 HC RX 250 W HCPCS SELF ADMINISTERED DRUGS (ALT 637 FOR MEDICARE OP, ALT 636 FOR OP/ED): Performed by: INTERNAL MEDICINE

## 2024-03-30 PROCEDURE — 1210000001 HC SEMI-PRIVATE ROOM DAILY

## 2024-03-30 PROCEDURE — S4991 NICOTINE PATCH NONLEGEND: HCPCS | Performed by: INTERNAL MEDICINE

## 2024-03-30 PROCEDURE — 2500000001 HC RX 250 WO HCPCS SELF ADMINISTERED DRUGS (ALT 637 FOR MEDICARE OP): Performed by: INTERNAL MEDICINE

## 2024-03-30 PROCEDURE — 85027 COMPLETE CBC AUTOMATED: CPT | Performed by: STUDENT IN AN ORGANIZED HEALTH CARE EDUCATION/TRAINING PROGRAM

## 2024-03-30 PROCEDURE — 82947 ASSAY GLUCOSE BLOOD QUANT: CPT

## 2024-03-30 PROCEDURE — 36415 COLL VENOUS BLD VENIPUNCTURE: CPT | Performed by: STUDENT IN AN ORGANIZED HEALTH CARE EDUCATION/TRAINING PROGRAM

## 2024-03-30 PROCEDURE — 80053 COMPREHEN METABOLIC PANEL: CPT | Performed by: STUDENT IN AN ORGANIZED HEALTH CARE EDUCATION/TRAINING PROGRAM

## 2024-03-30 PROCEDURE — 2500000004 HC RX 250 GENERAL PHARMACY W/ HCPCS (ALT 636 FOR OP/ED): Performed by: INTERNAL MEDICINE

## 2024-03-30 PROCEDURE — 99232 SBSQ HOSP IP/OBS MODERATE 35: CPT | Performed by: STUDENT IN AN ORGANIZED HEALTH CARE EDUCATION/TRAINING PROGRAM

## 2024-03-30 RX ADMIN — MORPHINE SULFATE 2 MG: 2 INJECTION, SOLUTION INTRAMUSCULAR; INTRAVENOUS at 12:52

## 2024-03-30 RX ADMIN — GABAPENTIN 600 MG: 300 CAPSULE ORAL at 15:16

## 2024-03-30 RX ADMIN — Medication 5 MG: at 20:44

## 2024-03-30 RX ADMIN — SODIUM CHLORIDE, PRESERVATIVE FREE 10 ML: 5 INJECTION INTRAVENOUS at 20:41

## 2024-03-30 RX ADMIN — VANCOMYCIN HYDROCHLORIDE 1 G: 1 INJECTION, SOLUTION INTRAVENOUS at 06:48

## 2024-03-30 RX ADMIN — HYDROCODONE BITARTRATE AND ACETAMINOPHEN 1 TABLET: 5; 325 TABLET ORAL at 12:17

## 2024-03-30 RX ADMIN — PIPERACILLIN SODIUM AND TAZOBACTAM SODIUM 3.38 G: 3; .375 INJECTION, SOLUTION INTRAVENOUS at 05:06

## 2024-03-30 RX ADMIN — GABAPENTIN 600 MG: 300 CAPSULE ORAL at 09:34

## 2024-03-30 RX ADMIN — PIPERACILLIN SODIUM AND TAZOBACTAM SODIUM 3.38 G: 3; .375 INJECTION, SOLUTION INTRAVENOUS at 13:09

## 2024-03-30 RX ADMIN — PIPERACILLIN SODIUM AND TAZOBACTAM SODIUM 3.38 G: 3; .375 INJECTION, SOLUTION INTRAVENOUS at 20:38

## 2024-03-30 RX ADMIN — MORPHINE SULFATE 2 MG: 2 INJECTION, SOLUTION INTRAMUSCULAR; INTRAVENOUS at 07:43

## 2024-03-30 RX ADMIN — LISINOPRIL 20 MG: 20 TABLET ORAL at 09:42

## 2024-03-30 RX ADMIN — HYDROCODONE BITARTRATE AND ACETAMINOPHEN 1 TABLET: 5; 325 TABLET ORAL at 03:16

## 2024-03-30 RX ADMIN — SODIUM CHLORIDE, PRESERVATIVE FREE 10 ML: 5 INJECTION INTRAVENOUS at 13:10

## 2024-03-30 RX ADMIN — HYDROCODONE BITARTRATE AND ACETAMINOPHEN 1 TABLET: 5; 325 TABLET ORAL at 18:35

## 2024-03-30 RX ADMIN — VANCOMYCIN HYDROCHLORIDE 1 G: 1 INJECTION, SOLUTION INTRAVENOUS at 17:26

## 2024-03-30 RX ADMIN — INSULIN GLARGINE 15 UNITS: 100 INJECTION, SOLUTION SUBCUTANEOUS at 20:38

## 2024-03-30 RX ADMIN — INSULIN LISPRO 2 UNITS: 100 INJECTION, SOLUTION INTRAVENOUS; SUBCUTANEOUS at 17:03

## 2024-03-30 RX ADMIN — MORPHINE SULFATE 2 MG: 2 INJECTION, SOLUTION INTRAMUSCULAR; INTRAVENOUS at 17:33

## 2024-03-30 RX ADMIN — GABAPENTIN 600 MG: 300 CAPSULE ORAL at 20:38

## 2024-03-30 RX ADMIN — MORPHINE SULFATE 2 MG: 2 INJECTION, SOLUTION INTRAMUSCULAR; INTRAVENOUS at 21:33

## 2024-03-30 RX ADMIN — NICOTINE 1 PATCH: 21 PATCH, EXTENDED RELEASE TRANSDERMAL at 09:00

## 2024-03-30 RX ADMIN — SODIUM CHLORIDE, PRESERVATIVE FREE 10 ML: 5 INJECTION INTRAVENOUS at 05:06

## 2024-03-30 ASSESSMENT — PAIN SCALES - GENERAL
PAINLEVEL_OUTOF10: 5 - MODERATE PAIN
PAINLEVEL_OUTOF10: 7
PAINLEVEL_OUTOF10: 8
PAINLEVEL_OUTOF10: 2
PAINLEVEL_OUTOF10: 7
PAINLEVEL_OUTOF10: 2
PAINLEVEL_OUTOF10: 7

## 2024-03-30 ASSESSMENT — PAIN - FUNCTIONAL ASSESSMENT
PAIN_FUNCTIONAL_ASSESSMENT: 0-10

## 2024-03-30 ASSESSMENT — PAIN DESCRIPTION - LOCATION: LOCATION: FOOT

## 2024-03-30 ASSESSMENT — PAIN DESCRIPTION - ORIENTATION: ORIENTATION: RIGHT

## 2024-03-30 NOTE — PROGRESS NOTES
Medical Group Progress Note  ASSESSMENT & PLAN:     Diabetic foot ulcer of R 1st digit c/b wet gangrene and OM  -p/w worsening R toe pain, skin necrosis, malodorous purulent drainage  -probes to bone in ED  -CRP elevated, XR showed OM  -had admission to [facility] in 11-12/2023 for L hand cellulitis d/t MSSA and R toe cellulitis with possible underlying OM with enterobacter on cx, was discharged with IV Invanz x3 weeks, followed by 90d PO abx (not sure which one)  Plan:  -ID consulted  -podiatry consulted, will likely need amputation this admission  -MRI R foot ordered  -ENRICO ordered  -cont empiric broad spectrum coverage with vanc/zosyn  -fu wound cx, bcx collected in ED  -pain control  -wound care  -gentle IVF     DM2  -check A1C  -hold metformin  -home Lantus at reduced dose, ssi, titrate prn     Hyponatremia  -partially pseudohyponatremia d/t hyperglycemia, but still hyponatremic despite correction for glucose  -check tsh with reflex t4  -trend with IVF, rpt BMP in AM  -hold home hydrochlorothiazide for now  -consider urine studies, nephrology consult if not improving     Tobacco use disorder  -still smoking 1.5-2ppd  -cessation counseling  -nrt with patch     HTN  -home meds except hydrochlorothiazide due to hyponatremia     Vte ppx low risk, scds  PT/OT eval after podiatry eval    03/28  -  due to reports of patient retaining gauze and left and from previous wound x-ray was ordered by ID.  -  there is an area of increased attenuation dorsal to the wrist on the lateral radiograph but no definite radiopaque packing material or  findings typical for foreign body noted.  -    per vascular surgery Patient bleeding well during surgery may need  angiogram/angioplasty.  -  current plan for surgery on 4/1 for right hallux amputation with biopsy of first metatarsal head and potential partial first ray resection.     03/27  -  Patient seen and assessed by ID who recommend continuing current antibiotic regimen as they  await podiatry recommendations for assistance in directing treatment.   - MRI foot notable for large air-filled soft ulcer in the medial aspect of the great toe with extension into the distal aspect of the first proximal phalanx with little distortion and distal tuft of the great toe and diffuse bone marrow edema and enhancement in the first proximal and distal phalanx.  There is also diffuse cellulitis left great toe  that extends around the first and second metatarsal.  There is subchondral bone marrow edema in the first metatarsal head and neck and sesamoid bone possibly due to degenerative changes.  There is also mild heterogeneous bone marrow edema and enhancement of fifth metatarsal.  -  Podiatry recommended evaluation by vascular surgery for possible  reperfusion surgery.  ABIs were done  which would findings that arterial flow would be sufficient for wound healing and that podiatry could proceed with amputation.  -  Continue with antibiotics and diabetic regimen        03/26  -  Podiatry is on board.  recommending that patient get MRI to evaluate bone involvement, potentially presents as above as blood flow.  -  Wound dressing recommendations made.  Surgical plan will follow after all testing is completed.  - sodium improving.   - continue with SSI  - awaiting  ID recommendations.  - rest of care as above        Total time >35 minutes; > 50% spent counseling/coordinating care      Navneet Miguel MD    SUBJECTIVE       No acute events overnight.  Patient is currently upset that he has to wait that long for surgery.  Has concerns about findings in hand per x-ray.  Will discuss with infectious disease and then discuss with patient.    OBJECTIVE:     Last Recorded Vitals:  Vitals:    03/29/24 1411 03/29/24 1917 03/30/24 0000 03/30/24 0813   BP: 161/74 173/83 145/69 132/69   BP Location:   Right arm    Patient Position:   Lying    Pulse: 67 55 58 57   Resp: 14  18    Temp: 36.5 °C (97.7 °F) 37.1 °C (98.8 °F) 37  °C (98.6 °F) 36.3 °C (97.3 °F)   TempSrc:   Temporal    SpO2: 94% 96% 97% 96%   Weight:       Height:         Last I/O:  I/O last 3 completed shifts:  In: 650 (8.5 mL/kg) [IV Piggyback:650]  Out: 400 (5.2 mL/kg) [Urine:400 (0.1 mL/kg/hr)]  Weight: 76.3 kg     Physical Exam    GEN: healthy appearing, appears stated age, NAD  CV: RRR, no m/r/g, no LE edema  LUNGS: CTAB, no w/r/c  NEURO: A+Ox3, no FND  PSYCH: appropriate mood, affect    MSK/extremities:  right great toe covered in dressing.  Dressing clean dry and intact.  Somewhat malodorous and tender to palpation.      Inpatient Medications:  gabapentin, 600 mg, oral, TID  insulin glargine, 15 Units, subcutaneous, Nightly  insulin lispro, 0-10 Units, subcutaneous, TID with meals  lisinopril, 20 mg, oral, Daily  nicotine, 1 patch, transdermal, Daily  piperacillin-tazobactam, 3.375 g, intravenous, q8h   And  sodium chloride, 10 mL, intravenous, q8h  vancomycin, 1 g, intravenous, q12h    PRN Medications  PRN medications: acetaminophen, albuterol, dextrose, dextrose, glucagon, glucagon, HYDROcodone-acetaminophen, melatonin, morphine, ondansetron **OR** ondansetron, polyethylene glycol, vancomycin  Continuous Medications:     LABS AND IMAGING:     Labs:  Results from last 7 days   Lab Units 03/30/24  1038 03/26/24  0541 03/25/24  1316   WBC AUTO x10*3/uL 10.0 6.8 8.6   RBC AUTO x10*6/uL 4.84 4.51 4.92   HEMOGLOBIN g/dL 14.1 13.0* 14.2   HEMATOCRIT % 41.6 38.4* 42.9   MCV fL 86 85 87   MCH pg 29.1 28.8 28.9   MCHC g/dL 33.9 33.9 33.1   RDW % 13.2 13.1 13.4   PLATELETS AUTO x10*3/uL 223 249 280     Results from last 7 days   Lab Units 03/26/24  0541 03/25/24  1316   SODIUM mmol/L 134* 128*   POTASSIUM mmol/L 3.8 3.8   CHLORIDE mmol/L 101 93*   CO2 mmol/L 25 26   BUN mg/dL 13 20   CREATININE mg/dL 0.80 0.92   GLUCOSE mg/dL 142* 279*   PROTEIN TOTAL g/dL  --  9.0*   CALCIUM mg/dL 8.9 9.6   BILIRUBIN TOTAL mg/dL  --  0.6   ALK PHOS U/L  --  92   AST U/L  --  10   ALT U/L  --   11     Results from last 7 days   Lab Units 03/26/24  0541   MAGNESIUM mg/dL 1.80         Imaging:  ECG 12 Lead  Sinus bradycardia  Nonspecific T wave abnormality  Abnormal ECG  No previous ECGs available  Confirmed by Mahamed Roberson ([id number]) on 3/29/2024 8:57:37 PM  Vascular US ankle brachial index (ENRICO) without exercise               [place] [id number]      Tel [phone] Fax [phone]       Vascular Lab Report     [facility] US ANKLE BRACHIAL INDEX (ENRICO) WITHOUT EXERCISE    Patient Name:      KATYA Bates Physician:  [id number] Gato Spivey MD  Study Date:        3/25/2024             Ordering Provider:  [id number] KEYANNA HENRY  MRN/PID:           [id number]              Fellow:  Accession#:        [id number]          Technologist:       Sheba Kumari RDMS, CATIA  Date of Birth/Age: 1965 / 58 years Technologist 2:  Gender:            M                     Encounter#:         [id number]  Admission Status:  Inpatient             Location Performed: [facility]       Diagnosis/ICD: Peripheral vascular disease, unspecified-I73.9  CPT Codes:     89495 Peripheral artery ENRICO Only       Smoker:            Current.  Pertinent History: Claudication, Leg pain and HTN. Grangrene on left 2nd toe.       CONCLUSIONS:  Right Lower PVR: No evidence of arterial occlusive disease in the right lower extremity at rest. Decreased digital perfusion noted. Monophasic flow is noted in the right posterior tibial artery and right dorsalis pedis artery. Triphasic flow is noted in the right common femoral artery. Right toe tracing taken on 2nd toe due to big toe wound and bandage.  Left Lower PVR: No evidence of arterial occlusive disease in the left lower extremity at rest. Normal  digital perfusion noted. Triphasic flow is noted in the left common femoral artery, left dorsalis pedis artery and left posterior tibial artery. Unable to get left blood pressure due to IV placement.     Imaging & Doppler Findings:     RIGHT Lower PVR                Pressures Ratios  Right Posterior Tibial (Ankle) 109 mmHg  0.80  Right Dorsalis Pedis (Ankle)   140 mmHg  1.03  Right Digit (Great Toe)        58 mmHg   0.43          LEFT Lower PVR                Pressures Ratios  Left Posterior Tibial (Ankle) 146 mmHg  1.07  Left Dorsalis Pedis (Ankle)   133 mmHg  0.98  Left Digit (Great Toe)        113 mmHg  0.83                             Right  Brachial Pressure 136 mmHg          [id number] Gato Spivey MD  Electronically signed by [id number] Gato Spivey MD on 3/29/2024 at 5:36:35 PM       ** Final **  XR hand left 3+ views  Narrative: Interpreted By:  Schoenberger, Joseph,   STUDY:  XR HAND LEFT 3+ VIEWS; ;  3/27/2024 7:17 pm      INDICATION:  Signs/Symptoms:previous abscess, ?retained packing material.      COMPARISON:  None.      ACCESSION NUMBER(S):  [id number]      ORDERING CLINICIAN:  AMY AGUILAR      FINDINGS:  There is a area of increased attenuation dorsal to the wrist on the  lateral radiograph. This is not the expected appearance of packing  material. No acute fracture or dislocation is seen.. The other joints  are maintained.      Impression: No definite radiopaque packing material. There is an area of  increased attenuation dorsal to the wrist on the lateral radiograph  which would not be typical for foreign body.          MACRO:  None      Signed by: Joseph Schoenberger 3/29/2024 8:16 AM  Dictation workstation:   [id number]

## 2024-03-30 NOTE — PROGRESS NOTES
Medical Group Progress Note  ASSESSMENT & PLAN:     Diabetic foot ulcer of R 1st digit c/b wet gangrene and OM  -p/w worsening R toe pain, skin necrosis, malodorous purulent drainage  -probes to bone in ED  -CRP elevated, XR showed OM  -had admission to [facility] in 11-12/2023 for L hand cellulitis d/t MSSA and R toe cellulitis with possible underlying OM with enterobacter on cx, was discharged with IV Invanz x3 weeks, followed by 90d PO abx (not sure which one)  Plan:  -ID consulted  -podiatry consulted, will likely need amputation this admission  -MRI R foot ordered  -ENRICO ordered  -cont empiric broad spectrum coverage with vanc/zosyn  -fu wound cx, bcx collected in ED  -pain control  -wound care  -gentle IVF     DM2  -check A1C  -hold metformin  -home Lantus at reduced dose, ssi, titrate prn     Hyponatremia  -partially pseudohyponatremia d/t hyperglycemia, but still hyponatremic despite correction for glucose  -check tsh with reflex t4  -trend with IVF, rpt BMP in AM  -hold home hydrochlorothiazide for now  -consider urine studies, nephrology consult if not improving     Tobacco use disorder  -still smoking 1.5-2ppd  -cessation counseling  -nrt with patch     HTN  -home meds except hydrochlorothiazide due to hyponatremia     Vte ppx low risk, scds  PT/OT eval after podiatry eval     03/27  -  Patient seen and assessed by ID who recommend continuing current antibiotic regimen as they await podiatry recommendations for assistance in directing treatment.   - MRI foot notable for large air-filled soft ulcer in the medial aspect of the great toe with extension into the distal aspect of the first proximal phalanx with little distortion and distal tuft of the great toe and diffuse bone marrow edema and enhancement in the first proximal and distal phalanx.  There is also diffuse cellulitis left great toe  that extends around the first and second metatarsal.  There is subchondral bone marrow edema in the first metatarsal  head and neck and sesamoid bone possibly due to degenerative changes.  There is also mild heterogeneous bone marrow edema and enhancement of fifth metatarsal.  -  Podiatry recommended evaluation by vascular surgery for possible  reperfusion surgery.  ABIs were done  which would findings that arterial flow would be sufficient for wound healing and that podiatry could proceed with amputation.  -  Continue with antibiotics and diabetic regimen       03/26  -  Podiatry is on board.  recommending that patient get MRI to evaluate bone involvement, potentially presents as above as blood flow.  -  Wound dressing recommendations made.  Surgical plan will follow after all testing is completed.  - sodium improving.   - continue with SSI  - awaiting  ID recommendations.  - rest of care as above      Total time >35 minutes; > 50% spent counseling/coordinating care      Navneet Miguel MD    SUBJECTIVE      patient in no acute distress.  Denies any fevers or chills nausea or vomiting.  Pain is currently well-controlled.    OBJECTIVE:     Last Recorded Vitals:  Vitals:    03/29/24 1411 03/29/24 1917 03/30/24 0000 03/30/24 0813   BP: 161/74 173/83 145/69 132/69   BP Location:   Right arm    Patient Position:   Lying    Pulse: 67 55 58 57   Resp: 14  18    Temp: 36.5 °C (97.7 °F) 37.1 °C (98.8 °F) 37 °C (98.6 °F) 36.3 °C (97.3 °F)   TempSrc:   Temporal    SpO2: 94% 96% 97% 96%   Weight:       Height:         Last I/O:  I/O last 3 completed shifts:  In: 650 (8.5 mL/kg) [IV Piggyback:650]  Out: 400 (5.2 mL/kg) [Urine:400 (0.1 mL/kg/hr)]  Weight: 76.3 kg     Physical Exam    GEN: healthy appearing, appears stated age, NAD  CV: RRR, no m/r/g, no LE edema  LUNGS: CTAB, no w/r/c  NEURO: A+Ox3, no FND  PSYCH: appropriate mood, affect    MSK/extremities:  right great toe covered in dressing.  Dressing clean dry and intact.  Somewhat malodorous and tender to palpation.    Inpatient Medications:  gabapentin, 600 mg, oral, TID  insulin  glargine, 15 Units, subcutaneous, Nightly  insulin lispro, 0-10 Units, subcutaneous, TID with meals  lisinopril, 20 mg, oral, Daily  nicotine, 1 patch, transdermal, Daily  piperacillin-tazobactam, 3.375 g, intravenous, q8h   And  sodium chloride, 10 mL, intravenous, q8h  vancomycin, 1 g, intravenous, q12h    PRN Medications  PRN medications: acetaminophen, albuterol, dextrose, dextrose, glucagon, glucagon, HYDROcodone-acetaminophen, melatonin, morphine, ondansetron **OR** ondansetron, polyethylene glycol, vancomycin  Continuous Medications:     LABS AND IMAGING:     Labs:  Results from last 7 days   Lab Units 03/26/24  0541 03/25/24  1316   WBC AUTO x10*3/uL 6.8 8.6   RBC AUTO x10*6/uL 4.51 4.92   HEMOGLOBIN g/dL 13.0* 14.2   HEMATOCRIT % 38.4* 42.9   MCV fL 85 87   MCH pg 28.8 28.9   MCHC g/dL 33.9 33.1   RDW % 13.1 13.4   PLATELETS AUTO x10*3/uL 249 280     Results from last 7 days   Lab Units 03/26/24  0541 03/25/24  1316   SODIUM mmol/L 134* 128*   POTASSIUM mmol/L 3.8 3.8   CHLORIDE mmol/L 101 93*   CO2 mmol/L 25 26   BUN mg/dL 13 20   CREATININE mg/dL 0.80 0.92   GLUCOSE mg/dL 142* 279*   PROTEIN TOTAL g/dL  --  9.0*   CALCIUM mg/dL 8.9 9.6   BILIRUBIN TOTAL mg/dL  --  0.6   ALK PHOS U/L  --  92   AST U/L  --  10   ALT U/L  --  11     Results from last 7 days   Lab Units 03/26/24  0541   MAGNESIUM mg/dL 1.80         Imaging:  ECG 12 Lead  Sinus bradycardia  Nonspecific T wave abnormality  Abnormal ECG  No previous ECGs available  Confirmed by Mahamed Roberson ([id number]) on 3/29/2024 8:57:37 PM  Vascular US ankle brachial index (ENRICO) without exercise               [place] [id number]      Tel [phone] Fax [phone]       Vascular Lab Report     [facility] US ANKLE BRACHIAL INDEX (ENRICO) WITHOUT EXERCISE    Patient Name:      KATYA Bates Physician:  [id number] Gato Spivey MD  Study Date:         3/25/2024             Ordering Provider:  [id number] KEYANNA HENRY  MRN/PID:           [id number]              Fellow:  Accession#:        [id number]          Technologist:       Sheba Kumari RDMS, ROBSON  Date of Birth/Age: 1965 / 58 years Technologist 2:  Gender:            M                     Encounter#:         [id number]  Admission Status:  Inpatient             Location Performed: [facility]       Diagnosis/ICD: Peripheral vascular disease, unspecified-I73.9  CPT Codes:     30859 Peripheral artery ENRICO Only       Smoker:            Current.  Pertinent History: Claudication, Leg pain and HTN. Grangrene on left 2nd toe.       CONCLUSIONS:  Right Lower PVR: No evidence of arterial occlusive disease in the right lower extremity at rest. Decreased digital perfusion noted. Monophasic flow is noted in the right posterior tibial artery and right dorsalis pedis artery. Triphasic flow is noted in the right common femoral artery. Right toe tracing taken on 2nd toe due to big toe wound and bandage.  Left Lower PVR: No evidence of arterial occlusive disease in the left lower extremity at rest. Normal digital perfusion noted. Triphasic flow is noted in the left common femoral artery, left dorsalis pedis artery and left posterior tibial artery. Unable to get left blood pressure due to IV placement.     Imaging & Doppler Findings:     RIGHT Lower PVR                Pressures Ratios  Right Posterior Tibial (Ankle) 109 mmHg  0.80  Right Dorsalis Pedis (Ankle)   140 mmHg  1.03  Right Digit (Great Toe)        58 mmHg   0.43          LEFT Lower PVR                Pressures Ratios  Left Posterior Tibial (Ankle) 146 mmHg  1.07  Left Dorsalis Pedis (Ankle)   133 mmHg  0.98  Left Digit (Great Toe)        113 mmHg  0.83                             Right  Brachial Pressure 136 mmHg          [id number]  Gato Spivey MD  Electronically signed by [id number] Gato Spivey MD on 3/29/2024 at 5:36:35 PM       ** Final **  XR hand left 3+ views  Narrative: Interpreted By:  Schoenberger, Joseph,   STUDY:  XR HAND LEFT 3+ VIEWS; ;  3/27/2024 7:17 pm      INDICATION:  Signs/Symptoms:previous abscess, ?retained packing material.      COMPARISON:  None.      ACCESSION NUMBER(S):  [id number]      ORDERING CLINICIAN:  AMY AGUILAR      FINDINGS:  There is a area of increased attenuation dorsal to the wrist on the  lateral radiograph. This is not the expected appearance of packing  material. No acute fracture or dislocation is seen.. The other joints  are maintained.      Impression: No definite radiopaque packing material. There is an area of  increased attenuation dorsal to the wrist on the lateral radiograph  which would not be typical for foreign body.          MACRO:  None      Signed by: Joseph Schoenberger 3/29/2024 8:16 AM  Dictation workstation:   [id number]

## 2024-03-30 NOTE — PROGRESS NOTES
Medical Group Progress Note  ASSESSMENT & PLAN:     Diabetic foot ulcer of R 1st digit c/b wet gangrene and OM  -p/w worsening R toe pain, skin necrosis, malodorous purulent drainage  -probes to bone in ED  -CRP elevated, XR showed OM  -had admission to [facility] in 11-12/2023 for L hand cellulitis d/t MSSA and R toe cellulitis with possible underlying OM with enterobacter on cx, was discharged with IV Invanz x3 weeks, followed by 90d PO abx (not sure which one)  Plan:  -ID consulted  -podiatry consulted, will likely need amputation this admission  -MRI R foot ordered  -ENRICO ordered  -cont empiric broad spectrum coverage with vanc/zosyn  -fu wound cx, bcx collected in ED  -pain control  -wound care  -gentle IVF     DM2  -check A1C  -hold metformin  -home Lantus at reduced dose, ssi, titrate prn     Hyponatremia  -partially pseudohyponatremia d/t hyperglycemia, but still hyponatremic despite correction for glucose  -check tsh with reflex t4  -trend with IVF, rpt BMP in AM  -hold home hydrochlorothiazide for now  -consider urine studies, nephrology consult if not improving     Tobacco use disorder  -still smoking 1.5-2ppd  -cessation counseling  -nrt with patch     HTN  -home meds except hydrochlorothiazide due to hyponatremia     Vte ppx low risk, scds  PT/OT eval after podiatry eval       03/26  -  Podiatry is on board.  recommending that patient get MRI to evaluate bone involvement, potentially presents as above as blood flow.  -  Wound dressing recommendations made.  Surgical plan will follow after all testing is completed.  - sodium improving.   - continue with SSI  - awaiting  ID recommendations.  - rest of care as above            Navnete Miguel MD    SUBJECTIVE      no acute events overnight.  Denies any fevers chills nausea or vomiting at this point in time.  Still having leg and hand pain.    OBJECTIVE:     Last Recorded Vitals:  Vitals:    03/29/24 1411 03/29/24 1917 03/30/24 0000 03/30/24 0813   BP:  161/74 173/83 145/69 132/69   BP Location:   Right arm    Patient Position:   Lying    Pulse: 67 55 58 57   Resp: 14  18    Temp: 36.5 °C (97.7 °F) 37.1 °C (98.8 °F) 37 °C (98.6 °F) 36.3 °C (97.3 °F)   TempSrc:   Temporal    SpO2: 94% 96% 97% 96%   Weight:       Height:         Last I/O:  I/O last 3 completed shifts:  In: 650 (8.5 mL/kg) [IV Piggyback:650]  Out: 400 (5.2 mL/kg) [Urine:400 (0.1 mL/kg/hr)]  Weight: 76.3 kg     Physical Exam  GEN: healthy appearing, appears stated age, NAD  CV: RRR, no m/r/g, no LE edema  LUNGS: CTAB, no w/r/c  NEURO: A+Ox3, no FND  PSYCH: appropriate mood, affect  SKIN:                  Inpatient Medications:  gabapentin, 600 mg, oral, TID  insulin glargine, 15 Units, subcutaneous, Nightly  insulin lispro, 0-10 Units, subcutaneous, TID with meals  lisinopril, 20 mg, oral, Daily  nicotine, 1 patch, transdermal, Daily  piperacillin-tazobactam, 3.375 g, intravenous, q8h   And  sodium chloride, 10 mL, intravenous, q8h  vancomycin, 1 g, intravenous, q12h    PRN Medications  PRN medications: acetaminophen, albuterol, dextrose, dextrose, glucagon, glucagon, HYDROcodone-acetaminophen, melatonin, morphine, ondansetron **OR** ondansetron, polyethylene glycol, vancomycin  Continuous Medications:     LABS AND IMAGING:     Labs:  Results from last 7 days   Lab Units 03/26/24  0541 03/25/24  1316   WBC AUTO x10*3/uL 6.8 8.6   RBC AUTO x10*6/uL 4.51 4.92   HEMOGLOBIN g/dL 13.0* 14.2   HEMATOCRIT % 38.4* 42.9   MCV fL 85 87   MCH pg 28.8 28.9   MCHC g/dL 33.9 33.1   RDW % 13.1 13.4   PLATELETS AUTO x10*3/uL 249 280     Results from last 7 days   Lab Units 03/26/24  0541 03/25/24  1316   SODIUM mmol/L 134* 128*   POTASSIUM mmol/L 3.8 3.8   CHLORIDE mmol/L 101 93*   CO2 mmol/L 25 26   BUN mg/dL 13 20   CREATININE mg/dL 0.80 0.92   GLUCOSE mg/dL 142* 279*   PROTEIN TOTAL g/dL  --  9.0*   CALCIUM mg/dL 8.9 9.6   BILIRUBIN TOTAL mg/dL  --  0.6   ALK PHOS U/L  --  92   AST U/L  --  10   ALT U/L  --  11      Results from last 7 days   Lab Units 03/26/24  0541   MAGNESIUM mg/dL 1.80         Imaging:  ECG 12 Lead  Sinus bradycardia  Nonspecific T wave abnormality  Abnormal ECG  No previous ECGs available  Confirmed by Mahamed Roberson ([id number]) on 3/29/2024 8:57:37 PM  Vascular US ankle brachial index (ENRICO) without exercise               [place] [id number]      Tel [phone] Fax [phone]       Vascular Lab Report     [facility] US ANKLE BRACHIAL INDEX (ENRICO) WITHOUT EXERCISE    Patient Name:      KATYA Bates Physician:  [id number] Gato Spivey MD  Study Date:        3/25/2024             Ordering Provider:  [id number] KEYANNA HENRY  MRN/PID:           [id number]              Fellow:  Accession#:        [id number]          Technologist:       Sheba Kumari RDMS, CATIA  Date of Birth/Age: 1965 / 58 years Technologist 2:  Gender:            M                     Encounter#:         [id number]  Admission Status:  Inpatient             Location Performed: [facility]       Diagnosis/ICD: Peripheral vascular disease, unspecified-I73.9  CPT Codes:     39387 Peripheral artery ENRICO Only       Smoker:            Current.  Pertinent History: Claudication, Leg pain and HTN. Grangrene on left 2nd toe.       CONCLUSIONS:  Right Lower PVR: No evidence of arterial occlusive disease in the right lower extremity at rest. Decreased digital perfusion noted. Monophasic flow is noted in the right posterior tibial artery and right dorsalis pedis artery. Triphasic flow is noted in the right common femoral artery. Right toe tracing taken on 2nd toe due to big toe wound and bandage.  Left Lower PVR: No evidence of arterial occlusive disease in the left lower extremity at rest. Normal digital  perfusion noted. Triphasic flow is noted in the left common femoral artery, left dorsalis pedis artery and left posterior tibial artery. Unable to get left blood pressure due to IV placement.     Imaging & Doppler Findings:     RIGHT Lower PVR                Pressures Ratios  Right Posterior Tibial (Ankle) 109 mmHg  0.80  Right Dorsalis Pedis (Ankle)   140 mmHg  1.03  Right Digit (Great Toe)        58 mmHg   0.43          LEFT Lower PVR                Pressures Ratios  Left Posterior Tibial (Ankle) 146 mmHg  1.07  Left Dorsalis Pedis (Ankle)   133 mmHg  0.98  Left Digit (Great Toe)        113 mmHg  0.83                             Right  Brachial Pressure 136 mmHg          [id number] Gato Spivey MD  Electronically signed by [id number] Gato Spivey MD on 3/29/2024 at 5:36:35 PM       ** Final **  XR hand left 3+ views  Narrative: Interpreted By:  Schoenberger, Joseph,   STUDY:  XR HAND LEFT 3+ VIEWS; ;  3/27/2024 7:17 pm      INDICATION:  Signs/Symptoms:previous abscess, ?retained packing material.      COMPARISON:  None.      ACCESSION NUMBER(S):  [id number]      ORDERING CLINICIAN:  AMY AGUILAR      FINDINGS:  There is a area of increased attenuation dorsal to the wrist on the  lateral radiograph. This is not the expected appearance of packing  material. No acute fracture or dislocation is seen.. The other joints  are maintained.      Impression: No definite radiopaque packing material. There is an area of  increased attenuation dorsal to the wrist on the lateral radiograph  which would not be typical for foreign body.          MACRO:  None      Signed by: Joseph Schoenberger 3/29/2024 8:16 AM  Dictation workstation:   [id number]

## 2024-03-30 NOTE — PROGRESS NOTES
Medical Group Progress Note  ASSESSMENT & PLAN:     Diabetic foot ulcer of R 1st digit c/b wet gangrene and OM  -p/w worsening R toe pain, skin necrosis, malodorous purulent drainage  -probes to bone in ED  -CRP elevated, XR showed OM  -had admission to [facility] in 11-12/2023 for L hand cellulitis d/t MSSA and R toe cellulitis with possible underlying OM with enterobacter on cx, was discharged with IV Invanz x3 weeks, followed by 90d PO abx (not sure which one)  Plan:  -ID consulted  -podiatry consulted, will likely need amputation this admission  -MRI R foot ordered  -ENRICO ordered  -cont empiric broad spectrum coverage with vanc/zosyn  -fu wound cx, bcx collected in ED  -pain control  -wound care  -gentle IVF     DM2  -check A1C  -hold metformin  -home Lantus at reduced dose, ssi, titrate prn     Hyponatremia  -partially pseudohyponatremia d/t hyperglycemia, but still hyponatremic despite correction for glucose  -check tsh with reflex t4  -trend with IVF, rpt BMP in AM  -hold home hydrochlorothiazide for now  -consider urine studies, nephrology consult if not improving     Tobacco use disorder  -still smoking 1.5-2ppd  -cessation counseling  -nrt with patch     HTN  -home meds except hydrochlorothiazide due to hyponatremia     Vte ppx low risk, scds  PT/OT eval after podiatry eval    03/29  -  Discussed results of hand x-ray with patient.  no emergent need for any intervention at this point in time.  Will have patient follow-up possibly with Ortho hand if  patient agrees.  - infectious disease will continue with current antibiotics.  patient growing Streptococcus anginosus group which is resistant to tetracyclines but susceptible to vancomycin and penicillin and ceftriaxone.  Patient also growing haemophilus parainfluenza.   - we will continue with antibiotics and monitor patient until Monday when podiatry will do the surgery.  Will follow-up with vascular surgery after surgery to see if there was adequate  bleeding to show that there would be good perfusion to accommodate appropriate wound healing..     03/28  -  due to reports of patient retaining gauze and left and from previous wound x-ray was ordered by ID.  -  there is an area of increased attenuation dorsal to the wrist on the lateral radiograph but no definite radiopaque packing material or  findings typical for foreign body noted.  -    per vascular surgery Patient bleeding well during surgery may need  angiogram/angioplasty.  -  current plan for surgery on 4/1 for right hallux amputation with biopsy of first metatarsal head and potential partial first ray resection.     03/27  -  Patient seen and assessed by ID who recommend continuing current antibiotic regimen as they await podiatry recommendations for assistance in directing treatment.   - MRI foot notable for large air-filled soft ulcer in the medial aspect of the great toe with extension into the distal aspect of the first proximal phalanx with little distortion and distal tuft of the great toe and diffuse bone marrow edema and enhancement in the first proximal and distal phalanx.  There is also diffuse cellulitis left great toe  that extends around the first and second metatarsal.  There is subchondral bone marrow edema in the first metatarsal head and neck and sesamoid bone possibly due to degenerative changes.  There is also mild heterogeneous bone marrow edema and enhancement of fifth metatarsal.  -  Podiatry recommended evaluation by vascular surgery for possible  reperfusion surgery.  ABIs were done  which would findings that arterial flow would be sufficient for wound healing and that podiatry could proceed with amputation.  -  Continue with antibiotics and diabetic regimen        03/26  -  Podiatry is on board.  recommending that patient get MRI to evaluate bone involvement, potentially presents as above as blood flow.  -  Wound dressing recommendations made.  Surgical plan will follow after all  testing is completed.  - sodium improving.   - continue with SSI  - awaiting  ID recommendations.  - rest of care as above        Total time >35 minutes; > 50% spent counseling/coordinating care      Navneet Miguel MD    SUBJECTIVE       No acute events overnight.  Patient with no new complaints.  No fevers or chills.    OBJECTIVE:     Last Recorded Vitals:  Vitals:    03/29/24 1411 03/29/24 1917 03/30/24 0000 03/30/24 0813   BP: 161/74 173/83 145/69 132/69   BP Location:   Right arm    Patient Position:   Lying    Pulse: 67 55 58 57   Resp: 14  18    Temp: 36.5 °C (97.7 °F) 37.1 °C (98.8 °F) 37 °C (98.6 °F) 36.3 °C (97.3 °F)   TempSrc:   Temporal    SpO2: 94% 96% 97% 96%   Weight:       Height:         Last I/O:  I/O last 3 completed shifts:  In: 650 (8.5 mL/kg) [IV Piggyback:650]  Out: 400 (5.2 mL/kg) [Urine:400 (0.1 mL/kg/hr)]  Weight: 76.3 kg     Physical Exam    GEN: healthy appearing, appears stated age, NAD  CV: RRR, no m/r/g, no LE edema  LUNGS: CTAB, no w/r/c  NEURO: A+Ox3, no FND  PSYCH: appropriate mood, affect    MSK/extremities:  right great toe covered in dressing.  Dressing clean dry and intact.  Somewhat malodorous and tender to palpation.    Inpatient Medications:  gabapentin, 600 mg, oral, TID  insulin glargine, 15 Units, subcutaneous, Nightly  insulin lispro, 0-10 Units, subcutaneous, TID with meals  lisinopril, 20 mg, oral, Daily  nicotine, 1 patch, transdermal, Daily  piperacillin-tazobactam, 3.375 g, intravenous, q8h   And  sodium chloride, 10 mL, intravenous, q8h  vancomycin, 1 g, intravenous, q12h    PRN Medications  PRN medications: acetaminophen, albuterol, dextrose, dextrose, glucagon, glucagon, HYDROcodone-acetaminophen, melatonin, morphine, ondansetron **OR** ondansetron, polyethylene glycol, vancomycin  Continuous Medications:     LABS AND IMAGING:     Labs:  Results from last 7 days   Lab Units 03/30/24  1038 03/26/24  0541 03/25/24  1316   WBC AUTO x10*3/uL 10.0 6.8 8.6   RBC AUTO  x10*6/uL 4.84 4.51 4.92   HEMOGLOBIN g/dL 14.1 13.0* 14.2   HEMATOCRIT % 41.6 38.4* 42.9   MCV fL 86 85 87   MCH pg 29.1 28.8 28.9   MCHC g/dL 33.9 33.9 33.1   RDW % 13.2 13.1 13.4   PLATELETS AUTO x10*3/uL 223 249 280     Results from last 7 days   Lab Units 03/30/24  1038 03/26/24  0541 03/25/24  1316   SODIUM mmol/L 139 134* 128*   POTASSIUM mmol/L 3.7 3.8 3.8   CHLORIDE mmol/L 108* 101 93*   CO2 mmol/L 24 25 26   BUN mg/dL 14 13 20   CREATININE mg/dL 0.94 0.80 0.92   GLUCOSE mg/dL 139* 142* 279*   PROTEIN TOTAL g/dL 7.6  --  9.0*   CALCIUM mg/dL 8.9 8.9 9.6   BILIRUBIN TOTAL mg/dL 0.4  --  0.6   ALK PHOS U/L 62  --  92   AST U/L 11  --  10   ALT U/L 11  --  11     Results from last 7 days   Lab Units 03/26/24  0541   MAGNESIUM mg/dL 1.80         Imaging:  ECG 12 Lead  Sinus bradycardia  Nonspecific T wave abnormality  Abnormal ECG  No previous ECGs available  Confirmed by Mahamed Roberson ([id number]) on 3/29/2024 8:57:37 PM  Vascular US ankle brachial index (ENRICO) without exercise               [place] [id number]      Tel [phone] Fax [phone]       Vascular Lab Report     [facility] US ANKLE BRACHIAL INDEX (ENRICO) WITHOUT EXERCISE    Patient Name:      KATYA Bates Physician:  [id number] Gato Spivey MD  Study Date:        3/25/2024             Ordering Provider:  [id number] HENRY  MRN/PID:           [id number]              Fellow:  Accession#:        [id number]          Technologist:       Sheba Kumari RDMS, T  Date of Birth/Age: 1965 / 58 years Technologist 2:  Gender:            M                     Encounter#:         [id number]  Admission Status:  Inpatient             Location Performed: [facility]       Diagnosis/ICD: Peripheral vascular  disease, unspecified-I73.9  CPT Codes:     32565 Peripheral artery ENRICO Only       Smoker:            Current.  Pertinent History: Claudication, Leg pain and HTN. Grangrene on left 2nd toe.       CONCLUSIONS:  Right Lower PVR: No evidence of arterial occlusive disease in the right lower extremity at rest. Decreased digital perfusion noted. Monophasic flow is noted in the right posterior tibial artery and right dorsalis pedis artery. Triphasic flow is noted in the right common femoral artery. Right toe tracing taken on 2nd toe due to big toe wound and bandage.  Left Lower PVR: No evidence of arterial occlusive disease in the left lower extremity at rest. Normal digital perfusion noted. Triphasic flow is noted in the left common femoral artery, left dorsalis pedis artery and left posterior tibial artery. Unable to get left blood pressure due to IV placement.     Imaging & Doppler Findings:     RIGHT Lower PVR                Pressures Ratios  Right Posterior Tibial (Ankle) 109 mmHg  0.80  Right Dorsalis Pedis (Ankle)   140 mmHg  1.03  Right Digit (Great Toe)        58 mmHg   0.43          LEFT Lower PVR                Pressures Ratios  Left Posterior Tibial (Ankle) 146 mmHg  1.07  Left Dorsalis Pedis (Ankle)   133 mmHg  0.98  Left Digit (Great Toe)        113 mmHg  0.83                             Right  Brachial Pressure 136 mmHg          [id number] Gato Spivey MD  Electronically signed by [id number] Gato Spivey MD on 3/29/2024 at 5:36:35 PM       ** Final **  XR hand left 3+ views  Narrative: Interpreted By:  Schoenberger, Joseph,   STUDY:  XR HAND LEFT 3+ VIEWS; ;  3/27/2024 7:17 pm      INDICATION:  Signs/Symptoms:previous abscess, ?retained packing material.      COMPARISON:  None.      ACCESSION NUMBER(S):  [id number]      ORDERING CLINICIAN:  AMY AGUILAR      FINDINGS:  There is a area of increased attenuation dorsal to the wrist on the  lateral radiograph. This is not the expected appearance of  packing  material. No acute fracture or dislocation is seen.. The other joints  are maintained.      Impression: No definite radiopaque packing material. There is an area of  increased attenuation dorsal to the wrist on the lateral radiograph  which would not be typical for foreign body.          MACRO:  None      Signed by: Joseph Schoenberger 3/29/2024 8:16 AM  Dictation workstation:   [id number]

## 2024-03-31 LAB
GLUCOSE BLD MANUAL STRIP-MCNC: 119 MG/DL (ref 74–99)
GLUCOSE BLD MANUAL STRIP-MCNC: 129 MG/DL (ref 74–99)
GLUCOSE BLD MANUAL STRIP-MCNC: 131 MG/DL (ref 74–99)
GLUCOSE BLD MANUAL STRIP-MCNC: 182 MG/DL (ref 74–99)

## 2024-03-31 PROCEDURE — 2500000001 HC RX 250 WO HCPCS SELF ADMINISTERED DRUGS (ALT 637 FOR MEDICARE OP): Performed by: INTERNAL MEDICINE

## 2024-03-31 PROCEDURE — 2500000001 HC RX 250 WO HCPCS SELF ADMINISTERED DRUGS (ALT 637 FOR MEDICARE OP): Performed by: STUDENT IN AN ORGANIZED HEALTH CARE EDUCATION/TRAINING PROGRAM

## 2024-03-31 PROCEDURE — 99231 SBSQ HOSP IP/OBS SF/LOW 25: CPT | Performed by: STUDENT IN AN ORGANIZED HEALTH CARE EDUCATION/TRAINING PROGRAM

## 2024-03-31 PROCEDURE — S4991 NICOTINE PATCH NONLEGEND: HCPCS | Performed by: INTERNAL MEDICINE

## 2024-03-31 PROCEDURE — 2500000002 HC RX 250 W HCPCS SELF ADMINISTERED DRUGS (ALT 637 FOR MEDICARE OP, ALT 636 FOR OP/ED): Performed by: INTERNAL MEDICINE

## 2024-03-31 PROCEDURE — 2500000004 HC RX 250 GENERAL PHARMACY W/ HCPCS (ALT 636 FOR OP/ED): Performed by: INTERNAL MEDICINE

## 2024-03-31 PROCEDURE — 1210000001 HC SEMI-PRIVATE ROOM DAILY

## 2024-03-31 PROCEDURE — 2500000004 HC RX 250 GENERAL PHARMACY W/ HCPCS (ALT 636 FOR OP/ED)

## 2024-03-31 PROCEDURE — 2500000004 HC RX 250 GENERAL PHARMACY W/ HCPCS (ALT 636 FOR OP/ED): Performed by: STUDENT IN AN ORGANIZED HEALTH CARE EDUCATION/TRAINING PROGRAM

## 2024-03-31 PROCEDURE — 82947 ASSAY GLUCOSE BLOOD QUANT: CPT

## 2024-03-31 RX ORDER — MORPHINE SULFATE 4 MG/ML
4 INJECTION, SOLUTION INTRAMUSCULAR; INTRAVENOUS EVERY 4 HOURS PRN
Status: DISCONTINUED | OUTPATIENT
Start: 2024-03-31 | End: 2024-04-03 | Stop reason: HOSPADM

## 2024-03-31 RX ADMIN — VANCOMYCIN HYDROCHLORIDE 1 G: 1 INJECTION, SOLUTION INTRAVENOUS at 04:29

## 2024-03-31 RX ADMIN — MORPHINE SULFATE 2 MG: 2 INJECTION, SOLUTION INTRAMUSCULAR; INTRAVENOUS at 05:49

## 2024-03-31 RX ADMIN — INSULIN GLARGINE 15 UNITS: 100 INJECTION, SOLUTION SUBCUTANEOUS at 20:17

## 2024-03-31 RX ADMIN — PIPERACILLIN SODIUM AND TAZOBACTAM SODIUM 3.38 G: 3; .375 INJECTION, SOLUTION INTRAVENOUS at 05:29

## 2024-03-31 RX ADMIN — LISINOPRIL 20 MG: 20 TABLET ORAL at 09:57

## 2024-03-31 RX ADMIN — HYDROCODONE BITARTRATE AND ACETAMINOPHEN 1 TABLET: 5; 325 TABLET ORAL at 01:09

## 2024-03-31 RX ADMIN — PIPERACILLIN SODIUM AND TAZOBACTAM SODIUM 3.38 G: 3; .375 INJECTION, SOLUTION INTRAVENOUS at 20:17

## 2024-03-31 RX ADMIN — HYDROCODONE BITARTRATE AND ACETAMINOPHEN 1 TABLET: 5; 325 TABLET ORAL at 07:37

## 2024-03-31 RX ADMIN — HYDROCODONE BITARTRATE AND ACETAMINOPHEN 1 TABLET: 5; 325 TABLET ORAL at 20:18

## 2024-03-31 RX ADMIN — MORPHINE SULFATE 4 MG: 4 INJECTION, SOLUTION INTRAMUSCULAR; INTRAVENOUS at 13:40

## 2024-03-31 RX ADMIN — SODIUM CHLORIDE, PRESERVATIVE FREE 10 ML: 5 INJECTION INTRAVENOUS at 05:29

## 2024-03-31 RX ADMIN — PIPERACILLIN SODIUM AND TAZOBACTAM SODIUM 3.38 G: 3; .375 INJECTION, SOLUTION INTRAVENOUS at 13:55

## 2024-03-31 RX ADMIN — HYDROCODONE BITARTRATE AND ACETAMINOPHEN 1 TABLET: 5; 325 TABLET ORAL at 13:56

## 2024-03-31 RX ADMIN — GABAPENTIN 600 MG: 300 CAPSULE ORAL at 20:17

## 2024-03-31 RX ADMIN — SODIUM CHLORIDE, PRESERVATIVE FREE 10 ML: 5 INJECTION INTRAVENOUS at 13:56

## 2024-03-31 RX ADMIN — NICOTINE 1 PATCH: 21 PATCH, EXTENDED RELEASE TRANSDERMAL at 09:57

## 2024-03-31 RX ADMIN — GABAPENTIN 600 MG: 300 CAPSULE ORAL at 15:44

## 2024-03-31 RX ADMIN — GABAPENTIN 600 MG: 300 CAPSULE ORAL at 09:57

## 2024-03-31 RX ADMIN — MORPHINE SULFATE 4 MG: 4 INJECTION, SOLUTION INTRAMUSCULAR; INTRAVENOUS at 21:49

## 2024-03-31 RX ADMIN — MORPHINE SULFATE 4 MG: 4 INJECTION, SOLUTION INTRAMUSCULAR; INTRAVENOUS at 17:43

## 2024-03-31 RX ADMIN — MORPHINE SULFATE 2 MG: 2 INJECTION, SOLUTION INTRAMUSCULAR; INTRAVENOUS at 09:58

## 2024-03-31 RX ADMIN — MORPHINE SULFATE 2 MG: 2 INJECTION, SOLUTION INTRAMUSCULAR; INTRAVENOUS at 01:43

## 2024-03-31 ASSESSMENT — COGNITIVE AND FUNCTIONAL STATUS - GENERAL
MOBILITY SCORE: 24
DAILY ACTIVITIY SCORE: 24
MOBILITY SCORE: 24
DAILY ACTIVITIY SCORE: 24
DAILY ACTIVITIY SCORE: 24
MOBILITY SCORE: 24

## 2024-03-31 ASSESSMENT — PAIN - FUNCTIONAL ASSESSMENT
PAIN_FUNCTIONAL_ASSESSMENT: 0-10

## 2024-03-31 ASSESSMENT — PAIN SCALES - GENERAL
PAINLEVEL_OUTOF10: 10 - WORST POSSIBLE PAIN
PAINLEVEL_OUTOF10: 10 - WORST POSSIBLE PAIN
PAINLEVEL_OUTOF10: 7
PAINLEVEL_OUTOF10: 2
PAINLEVEL_OUTOF10: 5 - MODERATE PAIN
PAINLEVEL_OUTOF10: 7
PAINLEVEL_OUTOF10: 10 - WORST POSSIBLE PAIN
PAINLEVEL_OUTOF10: 8
PAINLEVEL_OUTOF10: 4
PAINLEVEL_OUTOF10: 6
PAINLEVEL_OUTOF10: 5 - MODERATE PAIN

## 2024-03-31 ASSESSMENT — PAIN DESCRIPTION - LOCATION
LOCATION: FOOT
LOCATION: FOOT

## 2024-03-31 ASSESSMENT — PAIN DESCRIPTION - ORIENTATION
ORIENTATION: RIGHT

## 2024-03-31 NOTE — PROGRESS NOTES
Vancomycin Dosing by Pharmacy- Cessation of Therapy    Consult to pharmacy for vancomycin dosing has been discontinued by the prescriber, pharmacy will sign off at this time.    Please call pharmacy if there are further questions or re-enter a consult if vancomycin is resumed.     TAVO De La Torre. Ph.

## 2024-04-01 ENCOUNTER — APPOINTMENT (OUTPATIENT)
Dept: RADIOLOGY | Facility: HOSPITAL | Age: 59
End: 2024-04-01
Payer: COMMERCIAL

## 2024-04-01 ENCOUNTER — ANESTHESIA (OUTPATIENT)
Dept: OPERATING ROOM | Facility: HOSPITAL | Age: 59
End: 2024-04-01
Payer: COMMERCIAL

## 2024-04-01 ENCOUNTER — ANESTHESIA EVENT (OUTPATIENT)
Dept: OPERATING ROOM | Facility: HOSPITAL | Age: 59
End: 2024-04-01
Payer: COMMERCIAL

## 2024-04-01 PROBLEM — F17.200 CURRENT SMOKER: Status: ACTIVE | Noted: 2024-04-01

## 2024-04-01 PROBLEM — E11.9 DIABETES MELLITUS, TYPE 2 (MULTI): Status: ACTIVE | Noted: 2024-04-01

## 2024-04-01 PROBLEM — I10 HTN (HYPERTENSION): Status: ACTIVE | Noted: 2024-04-01

## 2024-04-01 LAB
ANION GAP SERPL CALC-SCNC: 10 MMOL/L (ref 10–20)
APTT PPP: 34 SECONDS (ref 27–38)
BUN SERPL-MCNC: 14 MG/DL (ref 6–23)
CALCIUM SERPL-MCNC: 8.8 MG/DL (ref 8.6–10.3)
CHLORIDE SERPL-SCNC: 111 MMOL/L (ref 98–107)
CO2 SERPL-SCNC: 24 MMOL/L (ref 21–32)
CREAT SERPL-MCNC: 0.82 MG/DL (ref 0.5–1.3)
EGFRCR SERPLBLD CKD-EPI 2021: >90 ML/MIN/1.73M*2
ERYTHROCYTE [DISTWIDTH] IN BLOOD BY AUTOMATED COUNT: 13.4 % (ref 11.5–14.5)
GLUCOSE BLD MANUAL STRIP-MCNC: 104 MG/DL (ref 74–99)
GLUCOSE BLD MANUAL STRIP-MCNC: 105 MG/DL (ref 74–99)
GLUCOSE BLD MANUAL STRIP-MCNC: 86 MG/DL (ref 74–99)
GLUCOSE SERPL-MCNC: 103 MG/DL (ref 74–99)
HCT VFR BLD AUTO: 40.5 % (ref 41–52)
HGB BLD-MCNC: 13.8 G/DL (ref 13.5–17.5)
INR PPP: 1.1 (ref 0.9–1.1)
MCH RBC QN AUTO: 29.5 PG (ref 26–34)
MCHC RBC AUTO-ENTMCNC: 34.1 G/DL (ref 32–36)
MCV RBC AUTO: 87 FL (ref 80–100)
NRBC BLD-RTO: 0 /100 WBCS (ref 0–0)
PLATELET # BLD AUTO: 235 X10*3/UL (ref 150–450)
POTASSIUM SERPL-SCNC: 3.9 MMOL/L (ref 3.5–5.3)
PROTHROMBIN TIME: 12.8 SECONDS (ref 9.8–12.8)
RBC # BLD AUTO: 4.68 X10*6/UL (ref 4.5–5.9)
SODIUM SERPL-SCNC: 141 MMOL/L (ref 136–145)
WBC # BLD AUTO: 11.2 X10*3/UL (ref 4.4–11.3)

## 2024-04-01 PROCEDURE — 2500000001 HC RX 250 WO HCPCS SELF ADMINISTERED DRUGS (ALT 637 FOR MEDICARE OP): Performed by: PODIATRIST

## 2024-04-01 PROCEDURE — 2500000001 HC RX 250 WO HCPCS SELF ADMINISTERED DRUGS (ALT 637 FOR MEDICARE OP): Performed by: INTERNAL MEDICINE

## 2024-04-01 PROCEDURE — 85610 PROTHROMBIN TIME: CPT | Performed by: STUDENT IN AN ORGANIZED HEALTH CARE EDUCATION/TRAINING PROGRAM

## 2024-04-01 PROCEDURE — 0Y6M0Z9 DETACHMENT AT RIGHT FOOT, PARTIAL 1ST RAY, OPEN APPROACH: ICD-10-PCS | Performed by: PODIATRIST

## 2024-04-01 PROCEDURE — 88305 TISSUE EXAM BY PATHOLOGIST: CPT | Performed by: STUDENT IN AN ORGANIZED HEALTH CARE EDUCATION/TRAINING PROGRAM

## 2024-04-01 PROCEDURE — 88311 DECALCIFY TISSUE: CPT | Performed by: STUDENT IN AN ORGANIZED HEALTH CARE EDUCATION/TRAINING PROGRAM

## 2024-04-01 PROCEDURE — 3600000003 HC OR TIME - INITIAL BASE CHARGE - PROCEDURE LEVEL THREE: Performed by: PODIATRIST

## 2024-04-01 PROCEDURE — 28820 AMPUTATION OF TOE: CPT | Performed by: PHYSICIAN ASSISTANT

## 2024-04-01 PROCEDURE — 3600000008 HC OR TIME - EACH INCREMENTAL 1 MINUTE - PROCEDURE LEVEL THREE: Performed by: PODIATRIST

## 2024-04-01 PROCEDURE — 73630 X-RAY EXAM OF FOOT: CPT | Mod: RT

## 2024-04-01 PROCEDURE — 2500000001 HC RX 250 WO HCPCS SELF ADMINISTERED DRUGS (ALT 637 FOR MEDICARE OP): Performed by: STUDENT IN AN ORGANIZED HEALTH CARE EDUCATION/TRAINING PROGRAM

## 2024-04-01 PROCEDURE — 1210000001 HC SEMI-PRIVATE ROOM DAILY

## 2024-04-01 PROCEDURE — 20245 BONE BIOPSY OPEN DEEP: CPT | Performed by: PHYSICIAN ASSISTANT

## 2024-04-01 PROCEDURE — 85027 COMPLETE CBC AUTOMATED: CPT | Performed by: STUDENT IN AN ORGANIZED HEALTH CARE EDUCATION/TRAINING PROGRAM

## 2024-04-01 PROCEDURE — 3700000002 HC GENERAL ANESTHESIA TIME - EACH INCREMENTAL 1 MINUTE: Performed by: PODIATRIST

## 2024-04-01 PROCEDURE — 0Y6P0Z0 DETACHMENT AT RIGHT 1ST TOE, COMPLETE, OPEN APPROACH: ICD-10-PCS | Performed by: PODIATRIST

## 2024-04-01 PROCEDURE — 2500000004 HC RX 250 GENERAL PHARMACY W/ HCPCS (ALT 636 FOR OP/ED): Performed by: ANESTHESIOLOGY

## 2024-04-01 PROCEDURE — 73630 X-RAY EXAM OF FOOT: CPT | Mod: RIGHT SIDE | Performed by: RADIOLOGY

## 2024-04-01 PROCEDURE — 88305 TISSUE EXAM BY PATHOLOGIST: CPT | Mod: TC,ELYLAB,WESLAB | Performed by: PODIATRIST

## 2024-04-01 PROCEDURE — 99231 SBSQ HOSP IP/OBS SF/LOW 25: CPT | Performed by: STUDENT IN AN ORGANIZED HEALTH CARE EDUCATION/TRAINING PROGRAM

## 2024-04-01 PROCEDURE — 2720000007 HC OR 272 NO HCPCS: Performed by: PODIATRIST

## 2024-04-01 PROCEDURE — S4991 NICOTINE PATCH NONLEGEND: HCPCS | Performed by: INTERNAL MEDICINE

## 2024-04-01 PROCEDURE — 2500000004 HC RX 250 GENERAL PHARMACY W/ HCPCS (ALT 636 FOR OP/ED): Performed by: STUDENT IN AN ORGANIZED HEALTH CARE EDUCATION/TRAINING PROGRAM

## 2024-04-01 PROCEDURE — 36415 COLL VENOUS BLD VENIPUNCTURE: CPT | Performed by: STUDENT IN AN ORGANIZED HEALTH CARE EDUCATION/TRAINING PROGRAM

## 2024-04-01 PROCEDURE — 3700000001 HC GENERAL ANESTHESIA TIME - INITIAL BASE CHARGE: Performed by: PODIATRIST

## 2024-04-01 PROCEDURE — 82947 ASSAY GLUCOSE BLOOD QUANT: CPT

## 2024-04-01 PROCEDURE — 87070 CULTURE OTHR SPECIMN AEROBIC: CPT | Mod: ELYLAB | Performed by: PODIATRIST

## 2024-04-01 PROCEDURE — 2500000004 HC RX 250 GENERAL PHARMACY W/ HCPCS (ALT 636 FOR OP/ED): Performed by: PODIATRIST

## 2024-04-01 PROCEDURE — 2500000004 HC RX 250 GENERAL PHARMACY W/ HCPCS (ALT 636 FOR OP/ED)

## 2024-04-01 PROCEDURE — 2500000005 HC RX 250 GENERAL PHARMACY W/O HCPCS: Performed by: PODIATRIST

## 2024-04-01 PROCEDURE — A4217 STERILE WATER/SALINE, 500 ML: HCPCS | Performed by: PODIATRIST

## 2024-04-01 PROCEDURE — 2500000002 HC RX 250 W HCPCS SELF ADMINISTERED DRUGS (ALT 637 FOR MEDICARE OP, ALT 636 FOR OP/ED): Performed by: INTERNAL MEDICINE

## 2024-04-01 PROCEDURE — 80048 BASIC METABOLIC PNL TOTAL CA: CPT | Performed by: STUDENT IN AN ORGANIZED HEALTH CARE EDUCATION/TRAINING PROGRAM

## 2024-04-01 RX ORDER — LIDOCAINE HYDROCHLORIDE 10 MG/ML
0.1 INJECTION, SOLUTION EPIDURAL; INFILTRATION; INTRACAUDAL; PERINEURAL ONCE
Status: CANCELLED | OUTPATIENT
Start: 2024-04-01 | End: 2024-04-01

## 2024-04-01 RX ORDER — BUPIVACAINE HYDROCHLORIDE 5 MG/ML
INJECTION, SOLUTION PERINEURAL AS NEEDED
Status: DISCONTINUED | OUTPATIENT
Start: 2024-04-01 | End: 2024-04-01 | Stop reason: HOSPADM

## 2024-04-01 RX ORDER — PROPOFOL 10 MG/ML
INJECTION, EMULSION INTRAVENOUS CONTINUOUS PRN
Status: DISCONTINUED | OUTPATIENT
Start: 2024-04-01 | End: 2024-04-01

## 2024-04-01 RX ORDER — SODIUM CHLORIDE, SODIUM LACTATE, POTASSIUM CHLORIDE, CALCIUM CHLORIDE 600; 310; 30; 20 MG/100ML; MG/100ML; MG/100ML; MG/100ML
INJECTION, SOLUTION INTRAVENOUS CONTINUOUS PRN
Status: DISCONTINUED | OUTPATIENT
Start: 2024-04-01 | End: 2024-04-01

## 2024-04-01 RX ORDER — SODIUM CHLORIDE, SODIUM LACTATE, POTASSIUM CHLORIDE, CALCIUM CHLORIDE 600; 310; 30; 20 MG/100ML; MG/100ML; MG/100ML; MG/100ML
100 INJECTION, SOLUTION INTRAVENOUS CONTINUOUS
Status: CANCELLED | OUTPATIENT
Start: 2024-04-01

## 2024-04-01 RX ORDER — METOCLOPRAMIDE HYDROCHLORIDE 5 MG/ML
10 INJECTION INTRAMUSCULAR; INTRAVENOUS ONCE AS NEEDED
Status: CANCELLED | OUTPATIENT
Start: 2024-04-01

## 2024-04-01 RX ORDER — FENTANYL CITRATE 50 UG/ML
25 INJECTION, SOLUTION INTRAMUSCULAR; INTRAVENOUS EVERY 5 MIN PRN
Status: CANCELLED | OUTPATIENT
Start: 2024-04-01

## 2024-04-01 RX ORDER — LIDOCAINE HYDROCHLORIDE 10 MG/ML
INJECTION, SOLUTION EPIDURAL; INFILTRATION; INTRACAUDAL; PERINEURAL AS NEEDED
Status: DISCONTINUED | OUTPATIENT
Start: 2024-04-01 | End: 2024-04-01 | Stop reason: HOSPADM

## 2024-04-01 RX ORDER — MEPERIDINE HYDROCHLORIDE 25 MG/ML
12.5 INJECTION INTRAMUSCULAR; INTRAVENOUS; SUBCUTANEOUS EVERY 10 MIN PRN
Status: CANCELLED | OUTPATIENT
Start: 2024-04-01

## 2024-04-01 RX ORDER — SODIUM CHLORIDE 0.9 G/100ML
IRRIGANT IRRIGATION AS NEEDED
Status: DISCONTINUED | OUTPATIENT
Start: 2024-04-01 | End: 2024-04-01 | Stop reason: HOSPADM

## 2024-04-01 RX ADMIN — MORPHINE SULFATE 4 MG: 4 INJECTION, SOLUTION INTRAMUSCULAR; INTRAVENOUS at 05:57

## 2024-04-01 RX ADMIN — HYDROCODONE BITARTRATE AND ACETAMINOPHEN 1 TABLET: 5; 325 TABLET ORAL at 22:01

## 2024-04-01 RX ADMIN — PROPOFOL 100 MCG/KG/MIN: 10 INJECTION, EMULSION INTRAVENOUS at 17:33

## 2024-04-01 RX ADMIN — PIPERACILLIN SODIUM AND TAZOBACTAM SODIUM 3.38 G: 3; .375 INJECTION, SOLUTION INTRAVENOUS at 20:03

## 2024-04-01 RX ADMIN — MORPHINE SULFATE 4 MG: 4 INJECTION, SOLUTION INTRAMUSCULAR; INTRAVENOUS at 01:55

## 2024-04-01 RX ADMIN — SODIUM CHLORIDE, SODIUM LACTATE, POTASSIUM CHLORIDE, AND CALCIUM CHLORIDE: 600; 310; 30; 20 INJECTION, SOLUTION INTRAVENOUS at 17:28

## 2024-04-01 RX ADMIN — GABAPENTIN 600 MG: 300 CAPSULE ORAL at 20:03

## 2024-04-01 RX ADMIN — MORPHINE SULFATE 4 MG: 4 INJECTION, SOLUTION INTRAMUSCULAR; INTRAVENOUS at 10:04

## 2024-04-01 RX ADMIN — PIPERACILLIN SODIUM AND TAZOBACTAM SODIUM 3.38 G: 3; .375 INJECTION, SOLUTION INTRAVENOUS at 04:47

## 2024-04-01 RX ADMIN — NICOTINE 1 PATCH: 21 PATCH, EXTENDED RELEASE TRANSDERMAL at 08:30

## 2024-04-01 RX ADMIN — GABAPENTIN 600 MG: 300 CAPSULE ORAL at 08:29

## 2024-04-01 RX ADMIN — PIPERACILLIN SODIUM AND TAZOBACTAM SODIUM 3.38 G: 3; .375 INJECTION, SOLUTION INTRAVENOUS at 12:57

## 2024-04-01 RX ADMIN — SODIUM CHLORIDE 10 ML: 9 INJECTION, SOLUTION INTRAVENOUS at 13:01

## 2024-04-01 RX ADMIN — GABAPENTIN 600 MG: 300 CAPSULE ORAL at 14:05

## 2024-04-01 RX ADMIN — MORPHINE SULFATE 4 MG: 4 INJECTION, SOLUTION INTRAMUSCULAR; INTRAVENOUS at 14:05

## 2024-04-01 RX ADMIN — MORPHINE SULFATE 4 MG: 4 INJECTION, SOLUTION INTRAMUSCULAR; INTRAVENOUS at 20:03

## 2024-04-01 RX ADMIN — LISINOPRIL 20 MG: 20 TABLET ORAL at 08:30

## 2024-04-01 RX ADMIN — SODIUM CHLORIDE, PRESERVATIVE FREE 10 ML: 5 INJECTION INTRAVENOUS at 04:47

## 2024-04-01 SDOH — HEALTH STABILITY: MENTAL HEALTH: CURRENT SMOKER: 1

## 2024-04-01 ASSESSMENT — COGNITIVE AND FUNCTIONAL STATUS - GENERAL
DAILY ACTIVITIY SCORE: 24
MOBILITY SCORE: 24

## 2024-04-01 ASSESSMENT — PAIN DESCRIPTION - ORIENTATION
ORIENTATION: RIGHT
ORIENTATION: RIGHT

## 2024-04-01 ASSESSMENT — PAIN SCALES - GENERAL
PAINLEVEL_OUTOF10: 8
PAINLEVEL_OUTOF10: 8
PAINLEVEL_OUTOF10: 3
PAINLEVEL_OUTOF10: 8
PAINLEVEL_OUTOF10: 4
PAIN_LEVEL: 0
PAINLEVEL_OUTOF10: 3
PAINLEVEL_OUTOF10: 3
PAINLEVEL_OUTOF10: 10 - WORST POSSIBLE PAIN
PAINLEVEL_OUTOF10: 8
PAINLEVEL_OUTOF10: 6
PAINLEVEL_OUTOF10: 8

## 2024-04-01 ASSESSMENT — PAIN - FUNCTIONAL ASSESSMENT
PAIN_FUNCTIONAL_ASSESSMENT: 0-10

## 2024-04-01 ASSESSMENT — PAIN DESCRIPTION - LOCATION
LOCATION: FOOT
LOCATION: FOOT

## 2024-04-01 ASSESSMENT — ENCOUNTER SYMPTOMS
CARDIOVASCULAR NEGATIVE: 1
GASTROINTESTINAL NEGATIVE: 1
RESPIRATORY NEGATIVE: 1
WOUND: 1

## 2024-04-01 NOTE — NURSING NOTE
Pt is going to OR today and is requesting that his dressing change not be done this morning despite being premedicated for pain.

## 2024-04-01 NOTE — PROGRESS NOTES
Medical Group Progress Note  ASSESSMENT & PLAN:     Diabetic foot ulcer of R 1st digit c/b wet gangrene and OM  -p/w worsening R toe pain, skin necrosis, malodorous purulent drainage  -probes to bone in ED  -CRP elevated, XR showed OM  -had admission to [facility] in 11-12/2023 for L hand cellulitis d/t MSSA and R toe cellulitis with possible underlying OM with enterobacter on cx, was discharged with IV Invanz x3 weeks, followed by 90d PO abx (not sure which one)  Plan:  -ID consulted  -podiatry consulted, will likely need amputation this admission  -MRI R foot ordered  -ENRICO ordered  -cont empiric broad spectrum coverage with vanc/zosyn  -fu wound cx, bcx collected in ED  -pain control  -wound care  -gentle IVF     DM2  -check A1C  -hold metformin  -home Lantus at reduced dose, ssi, titrate prn     Hyponatremia  -partially pseudohyponatremia d/t hyperglycemia, but still hyponatremic despite correction for glucose  -check tsh with reflex t4  -trend with IVF, rpt BMP in AM  -hold home hydrochlorothiazide for now  -consider urine studies, nephrology consult if not improving     Tobacco use disorder  -still smoking 1.5-2ppd  -cessation counseling  -nrt with patch     HTN  -home meds except hydrochlorothiazide due to hyponatremia     Vte ppx low risk, scds  PT/OT eval after podiatry eval     03/30  - continuing antibiotics.   - continue with wound dressing.  - continue pain regimen  - planning for surgery on Monday 03/29  -  Discussed results of hand x-ray with patient.  no emergent need for any intervention at this point in time.  Will have patient follow-up possibly with Ortho hand if  patient agrees.  - infectious disease will continue with current antibiotics.  patient growing Streptococcus anginosus group which is resistant to tetracyclines but susceptible to vancomycin and penicillin and ceftriaxone.  Patient also growing haemophilus parainfluenza.   - we will continue with antibiotics and monitor patient until  Monday when podiatry will do the surgery.  Will follow-up with vascular surgery after surgery to see if there was adequate bleeding to show that there would be good perfusion to accommodate appropriate wound healing..     03/28  -  due to reports of patient retaining gauze and left and from previous wound x-ray was ordered by ID.  -  there is an area of increased attenuation dorsal to the wrist on the lateral radiograph but no definite radiopaque packing material or  findings typical for foreign body noted.  -    per vascular surgery Patient bleeding well during surgery may need  angiogram/angioplasty.  -  current plan for surgery on 4/1 for right hallux amputation with biopsy of first metatarsal head and potential partial first ray resection.     03/27  -  Patient seen and assessed by ID who recommend continuing current antibiotic regimen as they await podiatry recommendations for assistance in directing treatment.   - MRI foot notable for large air-filled soft ulcer in the medial aspect of the great toe with extension into the distal aspect of the first proximal phalanx with little distortion and distal tuft of the great toe and diffuse bone marrow edema and enhancement in the first proximal and distal phalanx.  There is also diffuse cellulitis left great toe  that extends around the first and second metatarsal.  There is subchondral bone marrow edema in the first metatarsal head and neck and sesamoid bone possibly due to degenerative changes.  There is also mild heterogeneous bone marrow edema and enhancement of fifth metatarsal.  -  Podiatry recommended evaluation by vascular surgery for possible  reperfusion surgery.  ABIs were done  which would findings that arterial flow would be sufficient for wound healing and that podiatry could proceed with amputation.  -  Continue with antibiotics and diabetic regimen        03/26  -  Podiatry is on board.  recommending that patient get MRI to evaluate bone involvement,  potentially presents as above as blood flow.  -  Wound dressing recommendations made.  Surgical plan will follow after all testing is completed.  - sodium improving.   - continue with SSI  - awaiting  ID recommendations.  - rest of care as above        Total time >35 minutes; > 50% spent counseling/coordinating care      Navneet Miguel MD    [facility]. No current complaints.  No fevers or chills.     OBJECTIVE:     Last Recorded Vitals:  Vitals:    03/31/24 0055 03/31/24 0930 03/31/24 2010 04/01/24 0009   BP: 147/70 169/81 159/81 147/80   Patient Position: Lying      Pulse: 59 57 56 56   Resp:   16    Temp: 36.6 °C (97.9 °F) 37 °C (98.6 °F) 37.5 °C (99.5 °F) 35.7 °C (96.3 °F)   TempSrc:       SpO2: 94% 98% 96% 98%   Weight:       Height:         Last I/O:  I/O last 3 completed shifts:  In: 830 (10.9 mL/kg) [P.O.:100; IV Piggyback:730]  Out: 1075 (14.1 mL/kg) [Urine:1075 (0.4 mL/kg/hr)]  Weight: 76.3 kg     Physical Exam    GEN: healthy appearing, appears stated age, NAD  CV: RRR, no m/r/g, no LE edema  LUNGS: CTAB, no w/r/c  NEURO: A+Ox3, no FND  PSYCH: appropriate mood, affect    MSK/extremities:  right great toe covered in dressing.  Dressing clean dry and intact.  Somewhat malodorous and tender to palpation.    Inpatient Medications:  gabapentin, 600 mg, oral, TID  insulin glargine, 15 Units, subcutaneous, Nightly  insulin lispro, 0-10 Units, subcutaneous, TID with meals  lisinopril, 20 mg, oral, Daily  nicotine, 1 patch, transdermal, Daily  piperacillin-tazobactam, 3.375 g, intravenous, q8h   And  sodium chloride, 10 mL, intravenous, q8h    PRN Medications  PRN medications: acetaminophen, albuterol, dextrose, dextrose, glucagon, glucagon, HYDROcodone-acetaminophen, melatonin, morphine, ondansetron **OR** ondansetron, polyethylene glycol  Continuous Medications:     LABS AND IMAGING:     Labs:  Results from last 7 days   Lab Units 03/30/24  1038 03/26/24  0541 03/25/24  1316   WBC AUTO x10*3/uL 10.0  6.8 8.6   RBC AUTO x10*6/uL 4.84 4.51 4.92   HEMOGLOBIN g/dL 14.1 13.0* 14.2   HEMATOCRIT % 41.6 38.4* 42.9   MCV fL 86 85 87   MCH pg 29.1 28.8 28.9   MCHC g/dL 33.9 33.9 33.1   RDW % 13.2 13.1 13.4   PLATELETS AUTO x10*3/uL 223 249 280     Results from last 7 days   Lab Units 03/30/24  1038 03/26/24  0541 03/25/24  1316   SODIUM mmol/L 139 134* 128*   POTASSIUM mmol/L 3.7 3.8 3.8   CHLORIDE mmol/L 108* 101 93*   CO2 mmol/L 24 25 26   BUN mg/dL 14 13 20   CREATININE mg/dL 0.94 0.80 0.92   GLUCOSE mg/dL 139* 142* 279*   PROTEIN TOTAL g/dL 7.6  --  9.0*   CALCIUM mg/dL 8.9 8.9 9.6   BILIRUBIN TOTAL mg/dL 0.4  --  0.6   ALK PHOS U/L 62  --  92   AST U/L 11  --  10   ALT U/L 11  --  11     Results from last 7 days   Lab Units 03/26/24  0541   MAGNESIUM mg/dL 1.80         Imaging:  ECG 12 Lead  Sinus bradycardia  Nonspecific T wave abnormality  Abnormal ECG  No previous ECGs available  Confirmed by Mahamed Roberson ([id number]) on 3/29/2024 8:57:37 PM  Vascular US ankle brachial index (ENRICO) without exercise               [place] [address]      Tel [phone] Fax [phone]       Vascular Lab Report     [facility] US ANKLE BRACHIAL INDEX (ENRICO) WITHOUT EXERCISE    Patient Name:      KATYA Bates Physician:  [id number] Gato Spivey MD  Study Date:        3/25/2024             Ordering Provider:  [id number] HENRY  MRN/PID:           [id number]              Fellow:  Accession#:        [id number]          Technologist:       Sheba Kumari RDMS, T  Date of Birth/Age: 1965 / 58 years Technologist 2:  Gender:            M                     Encounter#:         [id number]  Admission Status:  Inpatient             Location Performed: [facility]       Diagnosis/ICD:  Peripheral vascular disease, unspecified-I73.9  CPT Codes:     79540 Peripheral artery ENRICO Only       Smoker:            Current.  Pertinent History: Claudication, Leg pain and HTN. Grangrene on left 2nd toe.       CONCLUSIONS:  Right Lower PVR: No evidence of arterial occlusive disease in the right lower extremity at rest. Decreased digital perfusion noted. Monophasic flow is noted in the right posterior tibial artery and right dorsalis pedis artery. Triphasic flow is noted in the right common femoral artery. Right toe tracing taken on 2nd toe due to big toe wound and bandage.  Left Lower PVR: No evidence of arterial occlusive disease in the left lower extremity at rest. Normal digital perfusion noted. Triphasic flow is noted in the left common femoral artery, left dorsalis pedis artery and left posterior tibial artery. Unable to get left blood pressure due to IV placement.     Imaging & Doppler Findings:     RIGHT Lower PVR                Pressures Ratios  Right Posterior Tibial (Ankle) 109 mmHg  0.80  Right Dorsalis Pedis (Ankle)   140 mmHg  1.03  Right Digit (Great Toe)        58 mmHg   0.43          LEFT Lower PVR                Pressures Ratios  Left Posterior Tibial (Ankle) 146 mmHg  1.07  Left Dorsalis Pedis (Ankle)   133 mmHg  0.98  Left Digit (Great Toe)        113 mmHg  0.83                             Right  Brachial Pressure 136 mmHg          [id number] Gato Spivey MD  Electronically signed by [id number] Gato Spivey MD on 3/29/2024 at 5:36:35 PM       ** Final **  XR hand left 3+ views  Narrative: Interpreted By:  Schoenberger, Joseph,   STUDY:  XR HAND LEFT 3+ VIEWS; ;  3/27/2024 7:17 pm      INDICATION:  Signs/Symptoms:previous abscess, ?retained packing material.      COMPARISON:  None.      ACCESSION NUMBER(S):  [id number]      ORDERING CLINICIAN:  AMY AGUILAR      FINDINGS:  There is a area of increased attenuation dorsal to the wrist on the  lateral radiograph. This is not the expected  appearance of packing  material. No acute fracture or dislocation is seen.. The other joints  are maintained.      Impression: No definite radiopaque packing material. There is an area of  increased attenuation dorsal to the wrist on the lateral radiograph  which would not be typical for foreign body.          MACRO:  None      Signed by: Joseph Schoenberger 3/29/2024 8:16 AM  Dictation workstation:   [id number]

## 2024-04-01 NOTE — CARE PLAN
The patient's goals for the shift include Pt will have pain controlled with score under 4.    The clinical goals for the shift include Pt will go to surgical procedure today.

## 2024-04-01 NOTE — OP NOTE
Right partial first ray amputation (R) Operative Note     Date: 3/25/2024 - 2024  OR Location: [facility] OR    Name: Dionicio Dyer, : 1965, Age: 58 y.o., MRN: [id number], Sex: male    Diagnosis  Pre-op Diagnosis     * Osteomyelitis of great toe (CMS/[facility]) [M86.9]     * Diabetic foot ulcer associated with diabetes mellitus due to underlying condition, unspecified laterality, unspecified part of foot, unspecified ulcer stage (CMS/[facility]) [E08.621, L97.509] Post-op Diagnosis     * Osteomyelitis of great toe (CMS/[facility]) [M86.9]     * Diabetic foot ulcer associated with diabetes mellitus due to underlying condition, unspecified laterality, unspecified part of foot, unspecified ulcer stage (CMS/[facility]) [E08.621, L97.509]     Procedures  Right partial first ray amputation with removal of hallux and metatarsal head right foot - CPT 11065       * Soraya Kirby - Primary    Resident/Fellow/Other Assistant:  Surgeon(s) and Role:    Procedure Summary  Anesthesia: Monitor Anesthesia Care  ASA: III  Anesthesia Staff: Anesthesiologist: Kristen Harden MD  Estimated Blood Loss: 20mL  Intra-op Medications:   Administrations occurring from 1645 to 1730 on 24:   Medication Name Total Dose   insulin lispro (HumaLOG) injection 0-10 Units Cannot be calculated              Anesthesia Record               Intraprocedure I/O Totals          Intake    Propofol Drip 0.00 mL    The total shown is the total volume documented since Anesthesia Start was filed.    Total Intake 0 mL          Specimen:   ID Type Source Tests Collected by Time   1 : RIGHT FIRST DIGIT Tissue DIGIT FIRST, RIGHT FOOT SURGICAL PATHOLOGY EXAM Soraya Kirby DPM 2024   2 : RIGHT FIRST METATARSAL Tissue BONE RESECTION SURGICAL PATHOLOGY EXAM Soraya Kirby DPM 2024   A : RIGHT FIRST DIGIT Tissue DIGIT FIRST, RIGHT FOOT TISSUE/WOUND CULTURE/SMEAR Soraya Kirby DPM 2024   B : RIGHT FIRST DIGIT POST LAVAGE CULTURE Swab DIGIT  FIRST, RIGHT FOOT TISSUE/WOUND CULTURE/SMEAR Soraya Kirby DPM 2024 3717        Staff:   Circulator: Francisco Stockton RN  Scrub Person: Jenigustavo Spring         Drains and/or Catheters: * None in log *    Tourniquet Times:   * Missing tourniquet times found for documented tourniquets in lo *     Implants:     Findings: Devitalized soft tissue and bone right foot    Indications: Dionicio Dyer is an 58 y.o. male who is having surgery for Osteomyelitis of great toe (CMS/[facility]) [M86.9]  Diabetic foot ulcer associated with diabetes mellitus due to underlying condition, unspecified laterality, unspecified part of foot, unspecified ulcer stage (CMS/[facility]) [E08.621, L97.509].     The patient was seen in the preoperative area. The risks, benefits, complications, treatment options, non-operative alternatives, expected recovery and outcomes were discussed with the patient. The possibilities of reaction to medication, pulmonary aspiration, injury to surrounding structures, bleeding, recurrent infection, the need for additional procedures, failure to diagnose a condition, and creating a complication requiring transfusion or operation were discussed with the patient. The patient concurred with the proposed plan, giving informed consent.  The site of surgery was properly noted/marked if necessary per policy. The patient has been actively warmed in preoperative area. Preoperative antibiotics given on the floor.  Patient is being therapeutically anticoagulated.        Procedure Details: This is a 58-year-old patient who presented with diabetic foot ulceration and osteomyelitis of the right hallux.  Advanced imaging showed confirmed osteomyelitis of the entire right hallux as well as potential osteomyelitis of the right first metatarsal head.  Discussed with patient the need for surgical removal of the right hallux with potential removal of the head of the first metatarsal.  All questions and concerns were answered to  patient's satisfaction.  No promises or guarantees were made.  Patient has been n.p.o. since midnight and history and physical in the chart.    Patient was brought to the operating room and a preprocedure huddle was performed.  Patient remained on the hospital bed in the supine position.  Following huddle and IV sedation the right foot scrubbed, prepped, draped in the normal aseptic manner.    Attention was then directed to the right hallux and first metatarsal where a local block consisting of 20 cc of a one-to-one mix of 1% lidocaine plain and 0.5% Marcaine plain was administered around the first metatarsophalangeal joint and proximal surgical neck of the first metatarsal.  A racquet incision was made around the first digit coursing along the proximal one third of the first metatarsal.  The incision was brought down to bone and scant amount of purulence was expressed from the digit.  Care was taken to maintain any vital neurovascular structures.  The digit was then disarticulated at the metatarsal phalangeal joint and placed on the side table to be sent to pathology and microbiology for cultures and examination.  The first metatarsal head was then examined and noted to be soft with areas of gray discoloration.  Due to the removal of necrotic tissue from the digit, it was deemed necessary to remove the proximal head of the first metatarsal.  The entire first metatarsal head and sesamoids were removed and any devitalized soft tissue was also removed.  The first metatarsal head was then sent to pathology for examination.    At this time and ultrasonic debrider and , Misonix, was used to irrigate and debride the surgical site.  After adequate debridement and irrigation the site was noted to be adequately flushed with adequate bleeding.  At this time the site was then closed using 2-0 Prolene and nylon.  The surgical site was then dressed with Betadine soaked Adaptic, 4 x 4's, ABD, Kerlix and Ace  wrap.    Patient tolerated the procedure well and was transferred to the postoperative recovery room with all vital signs stable and intact.  Complications:  None; patient tolerated the procedure well.    Disposition: PACU - hemodynamically stable.  Condition: stable         Additional Details: Off note, use of vocal Dragon dictation system was used to dictate this document.  All proper spelling and grammatical errors may not have been corrected prior to final submission.      Attending Attestation: I was present and scrubbed for the entire procedure.    Soraya Kirby  Phone Number: [phone]

## 2024-04-01 NOTE — PROGRESS NOTES
Primary MD: Callum Ruano MD    Reason For Consult  Diabetic foot infection  Right great toe osteomyelitis        History Of Present Illness  Dionicio Dyer is a 58 y.o. male      Right great toe osteomyelitis  Diabetic right foot infection status postdebridement  Cultures showing haemophilus mixed anaerobic bacteria        MRI -IMPRESSION:  Large air-filled soft tissue ulcer at the medial aspect of the great  toe with extension into the distal aspect of the 1st proximal  phalanx. There is also bone destruction at the distal tuft of the  great toe. There is diffuse bone marrow edema and enhancement in the  1st proximal and distal phalanx. These findings are consistent with  osteomyelitis.               Review of Systems   Respiratory: Negative.     Cardiovascular: Negative.    Gastrointestinal: Negative.    Skin:  Positive for wound.        Physical Exam  Cardiovascular:      Heart sounds: Normal heart sounds. No murmur heard.  Pulmonary:      Effort: Pulmonary effort is normal. No respiratory distress.      Breath sounds: Normal breath sounds. No wheezing, rhonchi or rales.   Abdominal:      General: Abdomen is flat. Bowel sounds are normal. There is no distension.      Tenderness: There is no abdominal tenderness. There is no guarding.   Musculoskeletal:      Comments: Right great toe is necrotic          Range of Vitals (last 24 hours)  Heart Rate:  [56-61]   Temp:  [35.7 °C (96.3 °F)-37.5 °C (99.5 °F)]   Resp:  [16]   BP: (140-159)/(80-81)   Weight:  [76.8 kg (169 lb 5 oz)]   SpO2:  [96 %-98 %]     Relevant Results  Results from last 72 hours   Lab Units 04/01/24  0549   WBC AUTO x10*3/uL 11.2   HEMOGLOBIN g/dL 13.8   HEMATOCRIT % 40.5*   PLATELETS AUTO x10*3/uL 235     Results from last 72 hours   Lab Units 04/01/24  0549   SODIUM mmol/L 141   POTASSIUM mmol/L 3.9   CHLORIDE mmol/L 111*   CO2 mmol/L 24   BUN mg/dL 14   CREATININE mg/dL 0.82   GLUCOSE mg/dL 103*   CALCIUM mg/dL 8.8   ANION GAP mmol/L  10   EGFR mL/min/1.73m*2 >90     Results from last 72 hours   Lab Units 03/30/24  1038   ALK PHOS U/L 62   BILIRUBIN TOTAL mg/dL 0.4   PROTEIN TOTAL g/dL 7.6   ALT U/L 11   AST U/L 11   ALBUMIN g/dL 3.5     Estimated Creatinine Clearance: 103.3 mL/min (by C-G formula based on SCr of 0.82 mg/dL).  C-Reactive Protein   Date/Time Value Ref Range Status   03/25/2024 01:16 PM 2.10 (H) <1.00 mg/dL Final     CRP   Date/Time Value Ref Range Status   10/04/2019 08:58 AM 0.48 mg/dL Final     Comment:     REF VALUE  < 1.00       Sedimentation Rate   Date/Time Value Ref Range Status   10/04/2019 08:58 AM 11 0 - 20 mm/h Final       Cultures  Susceptibility data from last 14 days.  Collected Specimen Info Organism Ceftriaxone Penicillin Tetracycline Vancomycin   03/25/24 Tissue/Biopsy from Diabetic Foot Ulcer Streptococcus anginosus group S S R S     Haemophilus parainfluenzae         Mixed Anaerobic Bacteria                Assessment/Plan     Diabetic foot infection  Right great toe osteomyelitis    On Zosyn     For surgery

## 2024-04-01 NOTE — CARE PLAN
The patient's goals for the shift include Pt will have pain controlled with score under 4.    The clinical goals for the shift include Pt will remain free from injury this shift.    Patient is progressing toward goals

## 2024-04-01 NOTE — ANESTHESIA PREPROCEDURE EVALUATION
Dionicio Dyer is a 58 y.o. male here for:    Right hallux amputation  With Soraya Kirby DPM  Pre-Op Diagnosis Codes:     * Osteomyelitis of great toe [M86.9]     * Diabetic foot ulcer associated with diabetes mellitus due to underlying condition, unspecified laterality, unspecified part of foot, unspecified ulcer stage [E08.621, L97.509]    Relevant Problems   Anesthesia (within normal limits)      Cardiac   (+) HTN (hypertension)      Endocrine   (+) Diabetes mellitus, type 2 (CMS/HCC)      Musculoskeletal   (+) Diabetic foot ulcer associated with diabetes mellitus due to underlying condition, unspecified laterality, unspecified part of foot, unspecified ulcer stage (CMS/HCC)      ID   (+) Osteomyelitis of great toe (CMS/HCC)      Tobacco   (+) Current smoker       Lab Results   Component Value Date    HGB 13.8 04/01/2024    HCT 40.5 (L) 04/01/2024    WBC 11.2 04/01/2024     04/01/2024     04/01/2024    K 3.9 04/01/2024     (H) 04/01/2024    CREATININE 0.82 04/01/2024    BUN 14 04/01/2024     EKG 3/2024:  Sinus bradycardia  Nonspecific T wave abnormality  Abnormal ECG  No previous ECGs available    Social History     Tobacco Use   Smoking Status Every Day   • Packs/day: 1.50   • Years: 45.00   • Additional pack years: 0.00   • Total pack years: 67.50   • Types: Cigarettes   Smokeless Tobacco Not on file       No Known Allergies    Current Outpatient Medications   Medication Instructions   • albuterol sulfate (PROAIR DIGIHALER) 180 mcg, inhalation, Every 6 hours PRN   • gabapentin (NEURONTIN) 300 mg, oral, 3 times daily   • Lantus U-100 Insulin 20 Units, subcutaneous, Every 24 hours, Take as directed per insulin instructions.   • lisinopriL-hydrochlorothiazide 20-25 mg tablet 1 tablet, oral, Daily   • metFORMIN (GLUCOPHAGE) 500 mg, oral, 2 times daily with meals       Past Surgical History:   Procedure Laterality Date   • JOINT REPLACEMENT     • OTHER SURGICAL HISTORY  11/02/2020    Knee  replacement       Family History   Problem Relation Name Age of Onset   • Diabetes type II Mother     • Cancer Father         NPO Details:  No data recorded    Physical Exam    Airway  Mallampati: I  TM distance: >3 FB  Neck ROM: full     Cardiovascular   Rhythm: regular     Dental - normal exam     Pulmonary   Breath sounds clear to auscultation     Abdominal        Anesthesia Plan    History of general anesthesia?: yes  History of complications of general anesthesia?: no    ASA 3     MAC     The patient is a current smoker.  Patient was previously instructed to abstain from smoking on day of procedure.  Education provided regarding risk of obstructive sleep apnea.  intravenous induction   Anesthetic plan and risks discussed with patient.  Use of blood products discussed with patient who consented to blood products.    Plan discussed with CRNA.

## 2024-04-01 NOTE — ANESTHESIA POSTPROCEDURE EVALUATION
Patient: Dionicio Dyer    Procedure Summary       Date: 04/01/24 Room / Location: [facility] 07 / [facility]    Anesthesia Start: 1728 Anesthesia Stop: 1822    Procedure: Right hallux amputation (Right) Diagnosis:       Osteomyelitis of great toe (CMS/HCC)      Diabetic foot ulcer associated with diabetes mellitus due to underlying condition, unspecified laterality, unspecified part of foot, unspecified ulcer stage (CMS/HCC)      (Osteomyelitis of great toe (CMS/HCC) [M86.9])      (Diabetic foot ulcer associated with diabetes mellitus due to underlying condition, unspecified laterality, unspecified part of foot, unspecified ulcer stage (CMS/HCC) [E08.621, L97.509])    Surgeons: Soraya Kirby DPM Responsible Provider: Kristen Harden MD    Anesthesia Type: MAC ASA Status: 3            Anesthesia Type: MAC    Vitals Value Taken Time   /82 04/01/24 1826   Temp 36 04/01/24 1826   Pulse 64 04/01/24 1826   Resp 14 04/01/24 1826   SpO2 100 04/01/24 1826       Anesthesia Post Evaluation    Patient location during evaluation: PACU  Patient participation: complete - patient participated  Level of consciousness: awake and alert  Pain score: 0  Pain management: adequate  Multimodal analgesia pain management approach  Airway patency: patent  Cardiovascular status: acceptable  Respiratory status: nasal cannula  Hydration status: acceptable  Postoperative Nausea and Vomiting: none      No notable events documented.

## 2024-04-01 NOTE — SIGNIFICANT EVENT
Patient underwent partial first ray right foot resection for treatment of diabetic foot infection osteomyelitis.    Surgical dressing intact.  Reinforcement orders placed    Diet reinstated    Pain medication and antibiotics per infectious disease and medicine team    Patient may be weightbearing as tolerated in surgical shoe    Podiatry will continue to follow while in house.  Please feel free to reach out with any questions or concerns    Soraya Kirby DPM      Off note, use of vocal Dragon dictation system was used to dictate this document.  All proper spelling and grammatical errors may not have been corrected prior to final submission.

## 2024-04-01 NOTE — DOCUMENTATION CLARIFICATION NOTE
"    PATIENT:               KATYA BREWER  ACCT #:                  [id number]  MRN:                       [id number]  :                       1965  ADMIT DATE:       3/25/2024 12:36 PM  DISCH DATE:  RESPONDING PROVIDER #:        [id number]          PROVIDER RESPONSE TEXT:    Moderate Protein Calorie Malnutrition    CDI QUERY TEXT:    [facility]_Nutrition Diagnosis    Instruction:    Based on your assessment of the patient and the clinical information, please provide the requested documentation by clicking on the appropriate radio button and enter any additional information if prompted.    Question: Please further clarify this patient nutritional status as    When answering this query, please exercise your independent professional judgment. The fact that a question is being asked, does not imply that any particular answer is desired or expected.    The patient's clinical indicators include:  Clinical Information: 57 yo male admitted with right hallux diabetic ulcer with osteomyelitis and wet gangrene.    Clinical Indicators:  Vitals (3/25 1209) Temp-36.4 HR-91 RR-20 BP-190/87 SPO2-99 RA    3/26 Nutrition consult Tamera Ritter, RD:  \"Malnutrition Diagnosis: Moderate malnutrition related to acute disease or injury  As Evidenced by: <75% intake of estimated energy requirements for > 1 month, mild fat loss/moderate muscle wasting, >5% wt loss in 1 month    Anthropometrics:  Height: 180.3 cm (5' 11\")  Weight: 76.3 kg (168 lb 3.4 oz)  BMI (Calculated): 23.47  IBW/kg (Dietitian Calculated): 78.2 kg  Percent of IBW: 98 %    Subcutaneous Fat Loss:  ? Orbital Fat Pads: Mild-Moderate (slight dark circles and slight hollowing)  ? Buccal Fat Pads: Mild-Moderate (flat cheeks, minimal bounce)  ? Triceps: Mild-moderate (less than ample fat tissue)  Muscle Wasting:  ? Temporalis: Mild-Moderate (slight depression)  ? Pectoralis (Clavicular Region): Well nourished (clavicle not visible)  ? Deltoid/Trapezius: Well nourished (rounded appearance " "at arm, shoulder, neck)  ? Interosseous: Well nourished (muscle bulges)  Edema:  ? Edema: +1 trace  ? Edema Location: RLE  Physical Findings:  ? Skin: Positive (diabetic ulcer to right great toe)\"    Treatment: nutrition education, blood sugar control, monitoring, nutrition consult    Risk Factors: Diabetes with toe ulcer, muscle wasting  Options provided:  -- Moderate Protein Calorie Malnutrition  -- Protein Calorie Malnutrition, Please specify severity  -- Other - I will add my own diagnosis  -- Refer to Clinical Documentation Reviewer    Query created by: Juliana Phipps on 3/28/2024 2:43 PM      Electronically signed by:  BUZZ BECK MD 4/1/2024 2:40 AM          "

## 2024-04-01 NOTE — INTERVAL H&P NOTE
H&P reviewed. The patient was examined and there are no changes to the H&P.       Soraya Kirby DPM

## 2024-04-01 NOTE — CARE PLAN
The patient's goals for the shift include Pt will have pain controlled with score under 4.    The clinical goals for the shift include Pt will go to surgical procedure today.    Patient is progressing toward goals

## 2024-04-01 NOTE — PROGRESS NOTES
Medical Group Progress Note  ASSESSMENT & PLAN:     Diabetic foot ulcer of R 1st digit c/b wet gangrene and OM  -p/w worsening R toe pain, skin necrosis, malodorous purulent drainage  -probes to bone in ED  -CRP elevated, XR showed OM  -had admission to [facility] in 11-12/2023 for L hand cellulitis d/t MSSA and R toe cellulitis with possible underlying OM with enterobacter on cx, was discharged with IV Invanz x3 weeks, followed by 90d PO abx (not sure which one)  Plan:  -ID consulted  -podiatry consulted, will likely need amputation this admission  -MRI R foot ordered  -ENRICO ordered  -cont empiric broad spectrum coverage with vanc/zosyn  -fu wound cx, bcx collected in ED  -pain control  -wound care  -gentle IVF     DM2  -check A1C  -hold metformin  -home Lantus at reduced dose, ssi, titrate prn     Hyponatremia  -partially pseudohyponatremia d/t hyperglycemia, but still hyponatremic despite correction for glucose  -check tsh with reflex t4  -trend with IVF, rpt BMP in AM  -hold home hydrochlorothiazide for now  -consider urine studies, nephrology consult if not improving     Tobacco use disorder  -still smoking 1.5-2ppd  -cessation counseling  -nrt with patch     HTN  -home meds except hydrochlorothiazide due to hyponatremia     Vte ppx low risk, scds  PT/OT eval after podiatry eval    03/31  - adjusting pain regimen with increased breakthrough coverage given patients increased ischemic foot pain.   - npo at midnight.    - basic labs ordered.  - continue abx  - surgery tomorrow.      03/30  - continuing antibiotics.   - continue with wound dressing.  - continue pain regimen  - planning for surgery on Monday 03/29  -  Discussed results of hand x-ray with patient.  no emergent need for any intervention at this point in time.  Will have patient follow-up possibly with Ortho hand if  patient agrees.  - infectious disease will continue with current antibiotics.  patient growing Streptococcus anginosus group which is  resistant to tetracyclines but susceptible to vancomycin and penicillin and ceftriaxone.  Patient also growing haemophilus parainfluenza.   - we will continue with antibiotics and monitor patient until Monday when podiatry will do the surgery.  Will follow-up with vascular surgery after surgery to see if there was adequate bleeding to show that there would be good perfusion to accommodate appropriate wound healing..     03/28  -  due to reports of patient retaining gauze and left and from previous wound x-ray was ordered by ID.  -  there is an area of increased attenuation dorsal to the wrist on the lateral radiograph but no definite radiopaque packing material or  findings typical for foreign body noted.  -    per vascular surgery Patient bleeding well during surgery may need  angiogram/angioplasty.  -  current plan for surgery on 4/1 for right hallux amputation with biopsy of first metatarsal head and potential partial first ray resection.     03/27  -  Patient seen and assessed by ID who recommend continuing current antibiotic regimen as they await podiatry recommendations for assistance in directing treatment.   - MRI foot notable for large air-filled soft ulcer in the medial aspect of the great toe with extension into the distal aspect of the first proximal phalanx with little distortion and distal tuft of the great toe and diffuse bone marrow edema and enhancement in the first proximal and distal phalanx.  There is also diffuse cellulitis left great toe  that extends around the first and second metatarsal.  There is subchondral bone marrow edema in the first metatarsal head and neck and sesamoid bone possibly due to degenerative changes.  There is also mild heterogeneous bone marrow edema and enhancement of fifth metatarsal.  -  Podiatry recommended evaluation by vascular surgery for possible  reperfusion surgery.  ABIs were done  which would findings that arterial flow would be sufficient for wound healing  and that podiatry could proceed with amputation.  -  Continue with antibiotics and diabetic regimen        03/26  -  Podiatry is on board.  recommending that patient get MRI to evaluate bone involvement, potentially presents as above as blood flow.  -  Wound dressing recommendations made.  Surgical plan will follow after all testing is completed.  - sodium improving.   - continue with SSI  - awaiting  ID recommendations.  - rest of care as above         Navneet Miguel MD    SUBJECTIVE     Increased foot pain overnight..  No fevers, chills nausea or vomiting     OBJECTIVE:     Last Recorded Vitals:  Vitals:    03/31/24 0055 03/31/24 0930 03/31/24 2010 04/01/24 0009   BP: 147/70 169/81 159/81 147/80   Patient Position: Lying      Pulse: 59 57 56 56   Resp:   16    Temp: 36.6 °C (97.9 °F) 37 °C (98.6 °F) 37.5 °C (99.5 °F) 35.7 °C (96.3 °F)   TempSrc:       SpO2: 94% 98% 96% 98%   Weight:       Height:         Last I/O:  I/O last 3 completed shifts:  In: 830 (10.9 mL/kg) [P.O.:100; IV Piggyback:730]  Out: 1075 (14.1 mL/kg) [Urine:1075 (0.4 mL/kg/hr)]  Weight: 76.3 kg     Physical Exam      GEN: healthy appearing, appears stated age, NAD  CV: RRR, no m/r/g, no LE edema  LUNGS: CTAB, no w/r/c  NEURO: A+Ox3, no FND  PSYCH: appropriate mood, affect    MSK/extremities:  right great toe covered in dressing.  Dressing clean dry and intact.  Somewhat malodorous and tender to palpation.    Inpatient Medications:  gabapentin, 600 mg, oral, TID  insulin glargine, 15 Units, subcutaneous, Nightly  insulin lispro, 0-10 Units, subcutaneous, TID with meals  lisinopril, 20 mg, oral, Daily  nicotine, 1 patch, transdermal, Daily  piperacillin-tazobactam, 3.375 g, intravenous, q8h   And  sodium chloride, 10 mL, intravenous, q8h    PRN Medications  PRN medications: acetaminophen, albuterol, dextrose, dextrose, glucagon, glucagon, HYDROcodone-acetaminophen, melatonin, morphine, ondansetron **OR** ondansetron, polyethylene glycol  Continuous  Medications:     LABS AND IMAGING:     Labs:  Results from last 7 days   Lab Units 03/30/24  1038 03/26/24  0541 03/25/24  1316   WBC AUTO x10*3/uL 10.0 6.8 8.6   RBC AUTO x10*6/uL 4.84 4.51 4.92   HEMOGLOBIN g/dL 14.1 13.0* 14.2   HEMATOCRIT % 41.6 38.4* 42.9   MCV fL 86 85 87   MCH pg 29.1 28.8 28.9   MCHC g/dL 33.9 33.9 33.1   RDW % 13.2 13.1 13.4   PLATELETS AUTO x10*3/uL 223 249 280     Results from last 7 days   Lab Units 03/30/24  1038 03/26/24  0541 03/25/24  1316   SODIUM mmol/L 139 134* 128*   POTASSIUM mmol/L 3.7 3.8 3.8   CHLORIDE mmol/L 108* 101 93*   CO2 mmol/L 24 25 26   BUN mg/dL 14 13 20   CREATININE mg/dL 0.94 0.80 0.92   GLUCOSE mg/dL 139* 142* 279*   PROTEIN TOTAL g/dL 7.6  --  9.0*   CALCIUM mg/dL 8.9 8.9 9.6   BILIRUBIN TOTAL mg/dL 0.4  --  0.6   ALK PHOS U/L 62  --  92   AST U/L 11  --  10   ALT U/L 11  --  11     Results from last 7 days   Lab Units 03/26/24  0541   MAGNESIUM mg/dL 1.80         Imaging:  ECG 12 Lead  Sinus bradycardia  Nonspecific T wave abnormality  Abnormal ECG  No previous ECGs available  Confirmed by Mahamed Roberson ([id number]) on 3/29/2024 8:57:37 PM  Vascular US ankle brachial index (ENRICO) without exercise               [place] [id number]      Tel [phone] Fax [phone]       Vascular Lab Report     [facility] US ANKLE BRACHIAL INDEX (ENRICO) WITHOUT EXERCISE    Patient Name:      KATYA Bates Physician:  [id number] Gato Spivey MD  Study Date:        3/25/2024             Ordering Provider:  [id number] HENRY  MRN/PID:           [id number]              Fellow:  Accession#:        [id number]          Technologist:       Sheba Kumari RDMS, RVT  Date of Birth/Age: 1965 / 58 years Technologist 2:  Gender:            M                     Encounter#:         [id number]  Admission  Status:  Inpatient             Location Performed: [facility]       Diagnosis/ICD: Peripheral vascular disease, unspecified-I73.9  CPT Codes:     36146 Peripheral artery ENRICO Only       Smoker:            Current.  Pertinent History: Claudication, Leg pain and HTN. Grangrene on left 2nd toe.       CONCLUSIONS:  Right Lower PVR: No evidence of arterial occlusive disease in the right lower extremity at rest. Decreased digital perfusion noted. Monophasic flow is noted in the right posterior tibial artery and right dorsalis pedis artery. Triphasic flow is noted in the right common femoral artery. Right toe tracing taken on 2nd toe due to big toe wound and bandage.  Left Lower PVR: No evidence of arterial occlusive disease in the left lower extremity at rest. Normal digital perfusion noted. Triphasic flow is noted in the left common femoral artery, left dorsalis pedis artery and left posterior tibial artery. Unable to get left blood pressure due to IV placement.     Imaging & Doppler Findings:     RIGHT Lower PVR                Pressures Ratios  Right Posterior Tibial (Ankle) 109 mmHg  0.80  Right Dorsalis Pedis (Ankle)   140 mmHg  1.03  Right Digit (Great Toe)        58 mmHg   0.43          LEFT Lower PVR                Pressures Ratios  Left Posterior Tibial (Ankle) 146 mmHg  1.07  Left Dorsalis Pedis (Ankle)   133 mmHg  0.98  Left Digit (Great Toe)        113 mmHg  0.83                             Right  Brachial Pressure 136 mmHg          [id number] Gato Spivey MD  Electronically signed by [id number] Gato Spivey MD on 3/29/2024 at 5:36:35 PM       ** Final **  XR hand left 3+ views  Narrative: Interpreted By:  Schoenberger, Joseph,   STUDY:  XR HAND LEFT 3+ VIEWS; ;  3/27/2024 7:17 pm      INDICATION:  Signs/Symptoms:previous abscess, ?retained packing material.      COMPARISON:  None.      ACCESSION NUMBER(S):  [id number]      ORDERING  CLINICIAN:  AMY AGUILAR      FINDINGS:  There is a area of increased attenuation dorsal to the wrist on the  lateral radiograph. This is not the expected appearance of packing  material. No acute fracture or dislocation is seen.. The other joints  are maintained.      Impression: No definite radiopaque packing material. There is an area of  increased attenuation dorsal to the wrist on the lateral radiograph  which would not be typical for foreign body.          MACRO:  None      Signed by: Joseph Schoenberger 3/29/2024 8:16 AM  Dictation workstation:   [id number]

## 2024-04-02 ENCOUNTER — TELEPHONE (OUTPATIENT)
Dept: FAMILY MEDICINE CLINIC | Age: 59
End: 2024-04-02

## 2024-04-02 LAB
ANION GAP SERPL CALC-SCNC: 10 MMOL/L (ref 10–20)
BUN SERPL-MCNC: 17 MG/DL (ref 6–23)
CALCIUM SERPL-MCNC: 8.5 MG/DL (ref 8.6–10.3)
CHLORIDE SERPL-SCNC: 109 MMOL/L (ref 98–107)
CO2 SERPL-SCNC: 24 MMOL/L (ref 21–32)
CREAT SERPL-MCNC: 0.87 MG/DL (ref 0.5–1.3)
EGFRCR SERPLBLD CKD-EPI 2021: >90 ML/MIN/1.73M*2
ERYTHROCYTE [DISTWIDTH] IN BLOOD BY AUTOMATED COUNT: 13.5 % (ref 11.5–14.5)
GLUCOSE BLD MANUAL STRIP-MCNC: 144 MG/DL (ref 74–99)
GLUCOSE BLD MANUAL STRIP-MCNC: 147 MG/DL (ref 74–99)
GLUCOSE BLD MANUAL STRIP-MCNC: 155 MG/DL (ref 74–99)
GLUCOSE BLD MANUAL STRIP-MCNC: 201 MG/DL (ref 74–99)
GLUCOSE SERPL-MCNC: 138 MG/DL (ref 74–99)
HCT VFR BLD AUTO: 39.8 % (ref 41–52)
HGB BLD-MCNC: 13.4 G/DL (ref 13.5–17.5)
HOLD SPECIMEN: NORMAL
MAGNESIUM SERPL-MCNC: 2.05 MG/DL (ref 1.6–2.4)
MCH RBC QN AUTO: 28.9 PG (ref 26–34)
MCHC RBC AUTO-ENTMCNC: 33.7 G/DL (ref 32–36)
MCV RBC AUTO: 86 FL (ref 80–100)
NRBC BLD-RTO: 0 /100 WBCS (ref 0–0)
PLATELET # BLD AUTO: 216 X10*3/UL (ref 150–450)
POTASSIUM SERPL-SCNC: 3.8 MMOL/L (ref 3.5–5.3)
RBC # BLD AUTO: 4.63 X10*6/UL (ref 4.5–5.9)
SODIUM SERPL-SCNC: 139 MMOL/L (ref 136–145)
WBC # BLD AUTO: 12.8 X10*3/UL (ref 4.4–11.3)

## 2024-04-02 PROCEDURE — 2500000001 HC RX 250 WO HCPCS SELF ADMINISTERED DRUGS (ALT 637 FOR MEDICARE OP): Performed by: PODIATRIST

## 2024-04-02 PROCEDURE — S4991 NICOTINE PATCH NONLEGEND: HCPCS | Performed by: PODIATRIST

## 2024-04-02 PROCEDURE — 97161 PT EVAL LOW COMPLEX 20 MIN: CPT | Mod: GP | Performed by: PHYSICAL THERAPIST

## 2024-04-02 PROCEDURE — 85027 COMPLETE CBC AUTOMATED: CPT | Performed by: INTERNAL MEDICINE

## 2024-04-02 PROCEDURE — 2500000004 HC RX 250 GENERAL PHARMACY W/ HCPCS (ALT 636 FOR OP/ED): Performed by: PODIATRIST

## 2024-04-02 PROCEDURE — 2500000002 HC RX 250 W HCPCS SELF ADMINISTERED DRUGS (ALT 637 FOR MEDICARE OP, ALT 636 FOR OP/ED): Performed by: PODIATRIST

## 2024-04-02 PROCEDURE — 97165 OT EVAL LOW COMPLEX 30 MIN: CPT | Mod: GO

## 2024-04-02 PROCEDURE — 1210000001 HC SEMI-PRIVATE ROOM DAILY

## 2024-04-02 PROCEDURE — 2500000001 HC RX 250 WO HCPCS SELF ADMINISTERED DRUGS (ALT 637 FOR MEDICARE OP): Performed by: INTERNAL MEDICINE

## 2024-04-02 PROCEDURE — 2500000004 HC RX 250 GENERAL PHARMACY W/ HCPCS (ALT 636 FOR OP/ED): Performed by: INTERNAL MEDICINE

## 2024-04-02 PROCEDURE — 80048 BASIC METABOLIC PNL TOTAL CA: CPT | Performed by: INTERNAL MEDICINE

## 2024-04-02 PROCEDURE — 83735 ASSAY OF MAGNESIUM: CPT | Performed by: INTERNAL MEDICINE

## 2024-04-02 PROCEDURE — 82947 ASSAY GLUCOSE BLOOD QUANT: CPT

## 2024-04-02 PROCEDURE — 99232 SBSQ HOSP IP/OBS MODERATE 35: CPT | Performed by: INTERNAL MEDICINE

## 2024-04-02 PROCEDURE — 36415 COLL VENOUS BLD VENIPUNCTURE: CPT | Performed by: INTERNAL MEDICINE

## 2024-04-02 RX ORDER — OXYCODONE AND ACETAMINOPHEN 5; 325 MG/1; MG/1
1 TABLET ORAL EVERY 4 HOURS PRN
Status: DISCONTINUED | OUTPATIENT
Start: 2024-04-02 | End: 2024-04-03 | Stop reason: HOSPADM

## 2024-04-02 RX ORDER — HYDROCHLOROTHIAZIDE 25 MG/1
25 TABLET ORAL DAILY
Status: DISCONTINUED | OUTPATIENT
Start: 2024-04-02 | End: 2024-04-03 | Stop reason: HOSPADM

## 2024-04-02 RX ORDER — KETOROLAC TROMETHAMINE 30 MG/ML
15 INJECTION, SOLUTION INTRAMUSCULAR; INTRAVENOUS EVERY 6 HOURS PRN
Status: DISCONTINUED | OUTPATIENT
Start: 2024-04-02 | End: 2024-04-03 | Stop reason: HOSPADM

## 2024-04-02 RX ORDER — HYDROCODONE BITARTRATE AND ACETAMINOPHEN 5; 325 MG/1; MG/1
1 TABLET ORAL EVERY 4 HOURS PRN
Status: DISCONTINUED | OUTPATIENT
Start: 2024-04-02 | End: 2024-04-02

## 2024-04-02 RX ORDER — LIDOCAINE HYDROCHLORIDE 10 MG/ML
5 INJECTION, SOLUTION INFILTRATION; PERINEURAL ONCE
Status: COMPLETED | OUTPATIENT
Start: 2024-04-02 | End: 2024-04-03

## 2024-04-02 RX ADMIN — MORPHINE SULFATE 4 MG: 4 INJECTION, SOLUTION INTRAMUSCULAR; INTRAVENOUS at 10:04

## 2024-04-02 RX ADMIN — PIPERACILLIN SODIUM AND TAZOBACTAM SODIUM 3.38 G: 3; .375 INJECTION, SOLUTION INTRAVENOUS at 21:41

## 2024-04-02 RX ADMIN — NICOTINE 1 PATCH: 21 PATCH, EXTENDED RELEASE TRANSDERMAL at 08:25

## 2024-04-02 RX ADMIN — HYDROCODONE BITARTRATE AND ACETAMINOPHEN 1 TABLET: 5; 325 TABLET ORAL at 04:05

## 2024-04-02 RX ADMIN — HYDROCODONE BITARTRATE AND ACETAMINOPHEN 1 TABLET: 5; 325 TABLET ORAL at 08:52

## 2024-04-02 RX ADMIN — GABAPENTIN 600 MG: 300 CAPSULE ORAL at 14:45

## 2024-04-02 RX ADMIN — MORPHINE SULFATE 4 MG: 4 INJECTION, SOLUTION INTRAMUSCULAR; INTRAVENOUS at 05:39

## 2024-04-02 RX ADMIN — GABAPENTIN 600 MG: 300 CAPSULE ORAL at 21:41

## 2024-04-02 RX ADMIN — OXYCODONE HYDROCHLORIDE AND ACETAMINOPHEN 1 TABLET: 5; 325 TABLET ORAL at 12:54

## 2024-04-02 RX ADMIN — INSULIN LISPRO 4 UNITS: 100 INJECTION, SOLUTION INTRAVENOUS; SUBCUTANEOUS at 17:04

## 2024-04-02 RX ADMIN — MORPHINE SULFATE 4 MG: 4 INJECTION, SOLUTION INTRAMUSCULAR; INTRAVENOUS at 19:57

## 2024-04-02 RX ADMIN — PIPERACILLIN SODIUM AND TAZOBACTAM SODIUM 3.38 G: 3; .375 INJECTION, SOLUTION INTRAVENOUS at 04:05

## 2024-04-02 RX ADMIN — MORPHINE SULFATE 4 MG: 4 INJECTION, SOLUTION INTRAMUSCULAR; INTRAVENOUS at 14:40

## 2024-04-02 RX ADMIN — GABAPENTIN 600 MG: 300 CAPSULE ORAL at 08:23

## 2024-04-02 RX ADMIN — LISINOPRIL 20 MG: 20 TABLET ORAL at 08:24

## 2024-04-02 RX ADMIN — KETOROLAC TROMETHAMINE 15 MG: 30 INJECTION, SOLUTION INTRAMUSCULAR at 11:23

## 2024-04-02 RX ADMIN — SODIUM CHLORIDE 10 ML: 9 INJECTION, SOLUTION INTRAVENOUS at 04:06

## 2024-04-02 RX ADMIN — OXYCODONE HYDROCHLORIDE AND ACETAMINOPHEN 1 TABLET: 5; 325 TABLET ORAL at 21:42

## 2024-04-02 RX ADMIN — INSULIN GLARGINE 15 UNITS: 100 INJECTION, SOLUTION SUBCUTANEOUS at 21:44

## 2024-04-02 RX ADMIN — OXYCODONE HYDROCHLORIDE AND ACETAMINOPHEN 1 TABLET: 5; 325 TABLET ORAL at 17:05

## 2024-04-02 RX ADMIN — KETOROLAC TROMETHAMINE 15 MG: 30 INJECTION, SOLUTION INTRAMUSCULAR at 18:45

## 2024-04-02 RX ADMIN — PIPERACILLIN SODIUM AND TAZOBACTAM SODIUM 3.38 G: 3; .375 INJECTION, SOLUTION INTRAVENOUS at 12:58

## 2024-04-02 RX ADMIN — MORPHINE SULFATE 4 MG: 4 INJECTION, SOLUTION INTRAMUSCULAR; INTRAVENOUS at 00:30

## 2024-04-02 RX ADMIN — SODIUM CHLORIDE 10 ML: 9 INJECTION, SOLUTION INTRAVENOUS at 21:42

## 2024-04-02 RX ADMIN — HYDROCHLOROTHIAZIDE 25 MG: 25 TABLET ORAL at 13:00

## 2024-04-02 RX ADMIN — SODIUM CHLORIDE 10 ML: 9 INJECTION, SOLUTION INTRAVENOUS at 12:58

## 2024-04-02 ASSESSMENT — COGNITIVE AND FUNCTIONAL STATUS - GENERAL
MOBILITY SCORE: 24
DAILY ACTIVITIY SCORE: 24
DAILY ACTIVITIY SCORE: 24
MOBILITY SCORE: 24

## 2024-04-02 ASSESSMENT — PAIN SCALES - GENERAL
PAINLEVEL_OUTOF10: 10 - WORST POSSIBLE PAIN
PAINLEVEL_OUTOF10: 8
PAINLEVEL_OUTOF10: 9
PAINLEVEL_OUTOF10: 5 - MODERATE PAIN
PAINLEVEL_OUTOF10: 5 - MODERATE PAIN
PAINLEVEL_OUTOF10: 10 - WORST POSSIBLE PAIN
PAINLEVEL_OUTOF10: 10 - WORST POSSIBLE PAIN
PAINLEVEL_OUTOF10: 4
PAINLEVEL_OUTOF10: 4
PAINLEVEL_OUTOF10: 9
PAINLEVEL_OUTOF10: 8
PAINLEVEL_OUTOF10: 7
PAINLEVEL_OUTOF10: 4
PAINLEVEL_OUTOF10: 5 - MODERATE PAIN
PAINLEVEL_OUTOF10: 7
PAINLEVEL_OUTOF10: 7
PAINLEVEL_OUTOF10: 8
PAINLEVEL_OUTOF10: 7
PAINLEVEL_OUTOF10: 4
PAINLEVEL_OUTOF10: 5 - MODERATE PAIN
PAINLEVEL_OUTOF10: 5 - MODERATE PAIN
PAINLEVEL_OUTOF10: 4
PAINLEVEL_OUTOF10: 7

## 2024-04-02 ASSESSMENT — PAIN - FUNCTIONAL ASSESSMENT

## 2024-04-02 ASSESSMENT — PAIN DESCRIPTION - ORIENTATION
ORIENTATION: RIGHT
ORIENTATION: RIGHT

## 2024-04-02 ASSESSMENT — PAIN DESCRIPTION - LOCATION
LOCATION: FOOT
LOCATION: FOOT

## 2024-04-02 ASSESSMENT — ENCOUNTER SYMPTOMS
GASTROINTESTINAL NEGATIVE: 1
RESPIRATORY NEGATIVE: 1
CARDIOVASCULAR NEGATIVE: 1
WOUND: 1

## 2024-04-02 ASSESSMENT — ACTIVITIES OF DAILY LIVING (ADL): BATHING_ASSISTANCE: INDEPENDENT

## 2024-04-02 NOTE — PROGRESS NOTES
Primary MD: Callum Ruano MD    Reason For Consult  Diabetic foot infection  Right great toe osteomyelitis        History Of Present Illness  Dionicio Dyer is a 58 y.o. male      Right great toe osteomyelitis  Diabetic right foot infection status postdebridement  Cultures showing haemophilus mixed anaerobic bacteria        MRI -IMPRESSION:  Large air-filled soft tissue ulcer at the medial aspect of the great  toe with extension into the distal aspect of the 1st proximal  phalanx. There is also bone destruction at the distal tuft of the  great toe. There is diffuse bone marrow edema and enhancement in the  1st proximal and distal phalanx. These findings are consistent with  osteomyelitis.        4/1/2024     Procedures  Right partial first ray amputation with removal of hallux and metatarsal head right foot  The first metatarsal head was then examined and noted to be soft with areas of gray discoloration     Review of Systems   Respiratory: Negative.     Cardiovascular: Negative.    Gastrointestinal: Negative.    Skin:  Positive for wound.        Physical Exam  Cardiovascular:      Heart sounds: Normal heart sounds. No murmur heard.  Pulmonary:      Effort: Pulmonary effort is normal. No respiratory distress.      Breath sounds: Normal breath sounds. No wheezing, rhonchi or rales.   Abdominal:      General: Abdomen is flat. Bowel sounds are normal. There is no distension.      Tenderness: There is no abdominal tenderness. There is no guarding.   Musculoskeletal:      Comments: Right great toe is necrotic          Range of Vitals (last 24 hours)  Heart Rate:  [52-65]   Temp:  [36.5 °C (97.7 °F)-36.8 °C (98.2 °F)]   Resp:  [16-19]   BP: (135-171)/(64-86)   Weight:  [76.7 kg (169 lb 1.5 oz)]   SpO2:  [96 %-98 %]     Relevant Results  Results from last 72 hours   Lab Units 04/02/24  0536   WBC AUTO x10*3/uL 12.8*   HEMOGLOBIN g/dL 13.4*   HEMATOCRIT % 39.8*   PLATELETS AUTO x10*3/uL 216       Results from  last 72 hours   Lab Units 04/02/24  0536   SODIUM mmol/L 139   POTASSIUM mmol/L 3.8   CHLORIDE mmol/L 109*   CO2 mmol/L 24   BUN mg/dL 17   CREATININE mg/dL 0.87   GLUCOSE mg/dL 138*   CALCIUM mg/dL 8.5*   ANION GAP mmol/L 10   EGFR mL/min/1.73m*2 >90             Estimated Creatinine Clearance: 97.4 mL/min (by C-G formula based on SCr of 0.87 mg/dL).  C-Reactive Protein   Date/Time Value Ref Range Status   03/25/2024 01:16 PM 2.10 (H) <1.00 mg/dL Final     CRP   Date/Time Value Ref Range Status   10/04/2019 08:58 AM 0.48 mg/dL Final     Comment:     REF VALUE  < 1.00       Sedimentation Rate   Date/Time Value Ref Range Status   10/04/2019 08:58 AM 11 0 - 20 mm/h Final       Cultures  Susceptibility data from last 14 days.  Collected Specimen Info Organism Ceftriaxone Penicillin Tetracycline Vancomycin   03/25/24 Tissue/Biopsy from Diabetic Foot Ulcer Streptococcus anginosus group S S R S     Haemophilus parainfluenzae         Mixed Anaerobic Bacteria                Assessment/Plan     Diabetic foot infection  Right great toe osteomyelitis    Had Right partial first ray amputation with removal of hallux and metatarsal head right foot  Await cult    On Zosyn plan 6 week

## 2024-04-02 NOTE — PROGRESS NOTES
"Occupational Therapy    Evaluation/Treatment    Patient Name: Dionicio Dyer \"Mariposa"  MRN: [id number]  : 1965  Today's Date: 24  Time Calculation  Start Time: 1313  Stop Time: 1322  Time Calculation (min): 9 min       Assessment:  End of Session Patient Position: Bed, 2 rail up, Alarm off, not on at start of session       Plan:  No Skilled OT: No acute OT goals identified  OT Frequency: OT eval only  OT Discharge Recommendations: No OT needed after discharge  OT Recommended Transfer Status: Independent  OT - OK to Discharge: Yes (when deemed medically appropriate)     Subjective             General:   OT Received On: 24  General  Reason for Referral: ADL impairment  Referred By: PT/OT 24 Malia WBAT in R surgical shoe per Dr. Kirby 24  Past Medical History Relevant to Rehab: HTN, DM, + smoker, osteomyelitis, Greater toe, TKA  Family/Caregiver Present: No  Patient Position Received: Bed, 2 rail up, Alarm off, not on at start of session (R leg elevated on pillows)  General Comment: To ED 3/25/24 with right great toe infection. Wet gangrene right hallux.  Osteomyeliitis right hallux. To OR 24 for partial 1st ray aputation, removal hallux and metatarsal  head . XR 24 Right foot pending; 3/27/2 Left hand question foreign body; MRI 3/25/24 Right foot large air filled soft tissue ulcer medial aspect right great toe    Precautions:  LE Weight Bearing Status:  (WBAT in surgical shoe)  Medical Precautions: No known precautions/limitation           Pain:  Pain Assessment  Pain Score: 5 - Moderate pain  Pain Type: Acute pain, Surgical pain  Pain Location: Foot  Pain Orientation: Right  Objective     Cognition:  Overall Cognitive Status: Within Functional Limits             Home Living:  Home Living Comments: Per patient report resides with spouse in 2 story home with basement , bedroom/bathroom 2nd floor with handrial. Has full bathroom 1st floor. Plan is to sleep in recliner 1st floor. 4 " steps with handrail Owns crutch, SW. Walk in shower 2nd floor no seat no grab bar.    Prior Function:  Prior Function Comments: Per patient report independent in mobility, ADLs and IADLs without assisive device. Denies nay falls. Drives. Works construction           Activities of Daily Living:   Eating Assistance: Independent  Grooming Assistance: Independent  Bathing Assistance: Independent  UE Dressing Assistance: Independent  LE Dressing Assistance: Independent  Toileting Assistance with Device: Independent  Functional Assistance:  (ambulated 40' with supervision without AD, surgical shoe on R foot)                         Activity Tolerance:  Endurance: Endurance does not limit participation in activity           Bed Mobility/Transfers: Bed Mobility  Bed Mobility:  (sup to sit to sup indep)  Transfers  Transfer:  (sit to stand to sit indep with R surgical shoe)                Balance:  Static Sitting: good  Dynamic Sitting: good  Static Standing: good  Dynamic Standing: good      Vision:Vision - Basic Assessment  Current Vision: No visual deficits        Sensation:  Light Touch:  (reports numbness in R foot and L Small finger)    Strength:  Strength Comments: B UE 4+/5            Extremities: RUE   RUE : Within Functional Limits and LUE   LUE: Within Functional Limits    Outcome Measures: [facility] Daily Activity  Putting on and taking off regular lower body clothing: None  Bathing (including washing, rinsing, drying): None  Putting on and taking off regular upper body clothing: None  Toileting, which includes using toilet, bedpan or urinal: None  Taking care of personal grooming such as brushing teeth: None  Eating Meals: None  Daily Activity - Total Score: 24

## 2024-04-02 NOTE — TELEPHONE ENCOUNTER
Pt spouse calling in regards to the forms that she had dropped off on Friday for provider to sign wants to know if this is ready to be picked up pt spouse phone is [phone]    Deadline for paper work is today

## 2024-04-02 NOTE — PROGRESS NOTES
ASSESSMENT & PLAN:     Diabetic foot ulcer of R 1st digit c/b wet gangrene and OM s/p partial R 1st ray amputation  -p/w worsening R toe pain, skin necrosis, malodorous purulent drainage  -MRI R foot showed 1st digit OM, as well as possible metatarsal OM  -wound culture growing strep anginosus, haemophilus parainfluenzae. Bcx ngtd.   -ENRICO showed no sig PAD  Plan:  -ID following, on Zosyn, if needs long term IV abx still needs access  -podiatry following, now s/p partial R 1st ray resection on 4/1. Okay for WBAT in surgical shoe.   -fu operative cultures  -pain control  -wound care  -pt/ot     DM2  -A1C 8.9  -hold metformin  -home Lantus at reduced dose, ssi, titrate prn  -gabapentin, inc to 600 tid here     Tobacco use disorder  -still smoking 1.5-2ppd  -cessation counseling  -nrt with patch     HTN  -cont home meds     Vte ppx scds, ambulate    Paul Finley MD    SUBJECTIVE     NAEON. Pain worse today post operatively.       OBJECTIVE:       Last Recorded Vitals:  Vitals:    04/01/24 1941 04/02/24 0058 04/02/24 0410 04/02/24 0738   BP: 171/83 149/77 152/70 135/64   BP Location:   Right arm Right arm   Patient Position:   Lying Lying   Pulse: 52 64 65 57   Resp: 17 18 19 18   Temp: 36.8 °C (98.2 °F) 36.7 °C (98.1 °F) 36.6 °C (97.9 °F) 36.5 °C (97.7 °F)   TempSrc:   Temporal Temporal   SpO2: 98% 97% 96% 98%   Weight:   76.7 kg (169 lb 1.5 oz)    Height:           Last I/O:  I/O last 3 completed shifts:  In: 1000 (13 mL/kg) [P.O.:200; I.V.:700 (9.1 mL/kg); IV Piggyback:100]  Out: 350 (4.6 mL/kg) [Urine:350 (0.1 mL/kg/hr)]  Weight: 76.7 kg     Physical Exam:  GEN: healthy appearing, appears stated age, NAD  CV: RRR, no m/r/g, no LE edema  LUNGS: CTAB, no w/r/c  NEURO: A+Ox3, no FND  PSYCH: appropriate mood, affect  MSK: R foot wrapped, some bloody strike through seen at medial aspect near surgical site    Inpatient Medications:  gabapentin, 600 mg, oral, TID  insulin glargine, 15 Units, subcutaneous, Nightly  insulin  lispro, 0-10 Units, subcutaneous, TID with meals  lisinopril, 20 mg, oral, Daily  nicotine, 1 patch, transdermal, Daily  piperacillin-tazobactam, 3.375 g, intravenous, q8h   And  sodium chloride, 10 mL, intravenous, q8h        PRN Medications  PRN medications: acetaminophen, albuterol, dextrose, dextrose, glucagon, glucagon, ketorolac, melatonin, morphine, ondansetron **OR** ondansetron, oxyCODONE-acetaminophen, polyethylene glycol    Continuous Medications:         LABS AND IMAGING:     Labs:  Results for orders placed or performed during the hospital encounter of 03/25/24 (from the past 24 hour(s))   Tissue/Wound Culture/Smear    Specimen: DIGIT FIRST, RIGHT FOOT; Swab   Result Value Ref Range    Gram Stain (2+) Few Polymorphonuclear leukocytes     Gram Stain No organisms seen    Tissue/Wound Culture/Smear    Specimen: DIGIT FIRST, RIGHT FOOT; Tissue   Result Value Ref Range    Gram Stain (1+) Rare Polymorphonuclear leukocytes     Gram Stain No organisms seen    POCT GLUCOSE   Result Value Ref Range    POCT Glucose 86 74 - 99 mg/dL   SST TOP   Result Value Ref Range    Extra Tube Hold for add-ons.    Magnesium   Result Value Ref Range    Magnesium 2.05 1.60 - 2.40 mg/dL   Basic Metabolic Panel   Result Value Ref Range    Glucose 138 (H) 74 - 99 mg/dL    Sodium 139 136 - 145 mmol/L    Potassium 3.8 3.5 - 5.3 mmol/L    Chloride 109 (H) 98 - 107 mmol/L    Bicarbonate 24 21 - 32 mmol/L    Anion Gap 10 10 - 20 mmol/L    Urea Nitrogen 17 6 - 23 mg/dL    Creatinine 0.87 0.50 - 1.30 mg/dL    eGFR >90 >60 mL/min/1.73m*2    Calcium 8.5 (L) 8.6 - 10.3 mg/dL   CBC   Result Value Ref Range    WBC 12.8 (H) 4.4 - 11.3 x10*3/uL    nRBC 0.0 0.0 - 0.0 /100 WBCs    RBC 4.63 4.50 - 5.90 x10*6/uL    Hemoglobin 13.4 (L) 13.5 - 17.5 g/dL    Hematocrit 39.8 (L) 41.0 - 52.0 %    MCV 86 80 - 100 fL    MCH 28.9 26.0 - 34.0 pg    MCHC 33.7 32.0 - 36.0 g/dL    RDW 13.5 11.5 - 14.5 %    Platelets 216 150 - 450 x10*3/uL   POCT GLUCOSE   Result  Value Ref Range    POCT Glucose 144 (H) 74 - 99 mg/dL        Imaging:  ECG 12 Lead  Sinus bradycardia  Nonspecific T wave abnormality  Abnormal ECG  No previous ECGs available  Confirmed by Mahamed Roberson ([id number]) on 3/29/2024 8:57:37 PM  Vascular US ankle brachial index (ENRICO) without exercise               [place] [id number]      Tel [phone] Fax [phone]       Vascular Lab Report     [facility] US ANKLE BRACHIAL INDEX (ENRICO) WITHOUT EXERCISE    Patient Name:      KATYA Bates Physician:  [id number] Gato Spivey MD  Study Date:        3/25/2024             Ordering Provider:  [id number] KEYANNA HENRY  MRN/PID:           [id number]              Fellow:  Accession#:        [id number]          Technologist:       Sheba Kumari RDMS, T  Date of Birth/Age: 1965 / 58 years Technologist 2:  Gender:            M                     Encounter#:         [id number]  Admission Status:  Inpatient             Location Performed: [facility]       Diagnosis/ICD: Peripheral vascular disease, unspecified-I73.9  CPT Codes:     84596 Peripheral artery ENRICO Only       Smoker:            Current.  Pertinent History: Claudication, Leg pain and HTN. Grangrene on left 2nd toe.       CONCLUSIONS:  Right Lower PVR: No evidence of arterial occlusive disease in the right lower extremity at rest. Decreased digital perfusion noted. Monophasic flow is noted in the right posterior tibial artery and right dorsalis pedis artery. Triphasic flow is noted in the right common femoral artery. Right toe tracing taken on 2nd toe due to big toe wound and bandage.  Left Lower PVR: No evidence of arterial occlusive disease in the left lower extremity at rest. Normal digital perfusion noted. Triphasic  flow is noted in the left common femoral artery, left dorsalis pedis artery and left posterior tibial artery. Unable to get left blood pressure due to IV placement.     Imaging & Doppler Findings:     RIGHT Lower PVR                Pressures Ratios  Right Posterior Tibial (Ankle) 109 mmHg  0.80  Right Dorsalis Pedis (Ankle)   140 mmHg  1.03  Right Digit (Great Toe)        58 mmHg   0.43          LEFT Lower PVR                Pressures Ratios  Left Posterior Tibial (Ankle) 146 mmHg  1.07  Left Dorsalis Pedis (Ankle)   133 mmHg  0.98  Left Digit (Great Toe)        113 mmHg  0.83                             Right  Brachial Pressure 136 mmHg          [id number] Gato Spivey MD  Electronically signed by [id number] Gato Spivey MD on 3/29/2024 at 5:36:35 PM       ** Final **  XR hand left 3+ views  Narrative: Interpreted By:  Schoenberger, Joseph,   STUDY:  XR HAND LEFT 3+ VIEWS; ;  3/27/2024 7:17 pm      INDICATION:  Signs/Symptoms:previous abscess, ?retained packing material.      COMPARISON:  None.      ACCESSION NUMBER(S):  [id number]      ORDERING CLINICIAN:  AMY AGIULAR      FINDINGS:  There is a area of increased attenuation dorsal to the wrist on the  lateral radiograph. This is not the expected appearance of packing  material. No acute fracture or dislocation is seen.. The other joints  are maintained.      Impression: No definite radiopaque packing material. There is an area of  increased attenuation dorsal to the wrist on the lateral radiograph  which would not be typical for foreign body.          MACRO:  None      Signed by: Joseph Schoenberger 3/29/2024 8:16 AM  Dictation workstation:   [id number]

## 2024-04-02 NOTE — PROGRESS NOTES
04/02/24 1510   Discharge Planning   Type of Home Care Services Home nursing visits   Patient expects to be discharged to: Home with [facility]     Pt is s/p right partial first ray amputation resection for treatment of Diabetic foot infection osteomyelitis. Pt is weight bearing as tolerated with surgical shoe. Pt will require PICC and 6 weeks IV Zosyn antibiotics. Pt DC preference remains home with [facility] for nursing, states himself and wife can learn the IVS. Attending has entered RX in Epic for IV Zosyn to  home care pharmacy. [facility] infusion coordinator notified to run benefit costs for antibiotic and supplies, awaiting benefit check.    Update: notified by [facility] intake insurance that pt Insurance verification 4.2.2024 completed by  Jennifer Mederos [phone] and Dionicio has full coverage with [employer] [facility] Medicaid.

## 2024-04-02 NOTE — PROGRESS NOTES
"Nutrition Follow Up Assessment:   Nutrition Assessment         Patient is a 58 y.o. male presenting with diabetic foot ulcer. Pt with past medical history of HTN, HLD, DM2, and medical noncompliance.  Pt is s/p right partial first ray amputation resection for treatment of Diabetic foot infection osteomyelitis 4/1         Nutrition History:  Food and Nutrient History: RDN follow up. Pt reports appetite improving, states he has been drinking Glucerna. Pt denies N/V/C/D. Pt states he is interested in outpatient diabetes education - will send secure chat to attending for referral. Will continue to monitor.  Food Allergies/Intolerances:  None  GI Symptoms: None  Oral Problems: None       Anthropometrics:  Height: 180.3 cm (5' 11\")   Weight: 76.7 kg (169 lb 1.5 oz)   BMI (Calculated): 23.59  IBW/kg (Dietitian Calculated): 78.2 kg  Percent of IBW: 98 %       Weight History:   Wt Readings from Last 10 Encounters:   04/02/24 76.7 kg (169 lb 1.5 oz)   02/12/21 89.9 kg (198 lb 2 oz)   11/02/20 86.3 kg (190 lb 3 oz)      Weight Change %:  Weight History / % Weight Change: Pt reports 16 lb, 8.6% significant wt loss x 1 month, no significant wt fluctuations since admission  Interpretation of Weight Loss: >5% in 1 month    Nutrition Focused Physical Exam Findings:  defer: see 3/26 assessment  Subcutaneous Fat Loss:      Muscle Wasting:     Edema:  Edema: +1 trace  Edema Location: RLE, nonpitting LLE  Physical Findings:  Skin: Positive (right foot incision)    Nutrition Significant Labs:  BG POCT trend:   Results from last 7 days   Lab Units 04/02/24  1601 04/02/24  1112 04/02/24  0609 04/01/24  1941 04/01/24  1042   POCT GLUCOSE mg/dL 201* 147* 144* 86 105*    , Renal Lab Trend:   Results from last 7 days   Lab Units 04/02/24  0536 04/01/24  0549 03/30/24  1038   POTASSIUM mmol/L 3.8 3.9 3.7   SODIUM mmol/L 139 141 139   MAGNESIUM mg/dL 2.05  --   --    EGFR mL/min/1.73m*2 >90 >90 >90   BUN mg/dL 17 14 14   CREATININE mg/dL 0.87 " 0.82 0.94        Nutrition Specific Medications:  Reviewed     I/O:   Last BM Date: 04/01/24; Stool Appearance: Loose (03/31/24 0955)    Dietary Orders (From admission, onward)       Start     Ordered    04/01/24 1835  Adult diet Carb Controlled; 75 gram carb/meal, 45 gram Carb evening snack  Diet effective now        Question Answer Comment   Diet type Carb Controlled    Carb diet selection: 75 gram carb/meal, 45 gram Carb evening snack        04/01/24 1834    03/26/24 1617  Oral nutritional supplements  Until discontinued        Question Answer Comment   Deliver with Lunch    Deliver with Dinner    Select supplement: Glucerna Shake        03/26/24 1616                     Estimated Needs:   Total Energy Estimated Needs (kCal): 1900 kCal  Method for Estimating Needs: 25 kcal/kg ABW  Total Protein Estimated Needs (g): 114 g  Method for Estimating Needs: 1.5 g/kg ABW  Total Fluid Estimated Needs (mL): 1900 mL  Method for Estimating Needs: 1 ml/kcal or per MD        Nutrition Diagnosis   Malnutrition Diagnosis  Patient has Malnutrition Diagnosis: Yes  Diagnosis Status: Declining  Malnutrition Diagnosis: Moderate malnutrition related to acute disease or injury  As Evidenced by: <75% intake of estimated energy requirements for > 1 month, mild fat loss/moderate muscle wasting, >5% wt loss in 1 month    Nutrition Diagnosis  Patient has Nutrition Diagnosis: Yes  Diagnosis Status (1): Ongoing  Nutrition Diagnosis 1: Increased nutrient needs  Related to (1): healing  As Evidenced by (1): diabetic foot ulcer to right great toe       Nutrition Interventions/Recommendations         Nutrition Prescription:  Individualized Nutrition Prescription Provided for : Continue current diet regimen        Nutrition Interventions:   Interventions: Meals and snacks, Medical food supplement  Meals and Snacks: Carbohydrate-modified diet, General healthful diet, Mineral-modified diet  Goal: Consumes 3 meals per day  Medical Food Supplement:  Commercial beverage  Goal: Glucerna BID (provides 220 kcal, 10 g protein per serving).    Collaboration and Referral of Nutrition Care: Collaboration by nutrition professional with other providers, Referral by nutrition professional to another nutrition professional with different expertise  Goal: secure chat to attending    Nutrition Education:   Pt requesting outpatient referral for diabetes education.       Nutrition Monitoring and Evaluation   Food/Nutrient Related History Monitoring  Monitoring and Evaluation Plan: Energy intake, Amount of food, Fluid intake  Energy Intake: Estimated energy intake  Criteria: Pt meets >75% of estimated energy needs - met  Fluid Intake: Estimated fluid intake  Criteria: Meets >75% of estimated fluid needs - met  Amount of Food: Estimated amout of food, Medical food intake  Criteria: Pt consumes >75% of meals and supplements - met    Body Composition/Growth/Weight History  Monitoring and Evaluation Plan: Weight  Weight: Measured weight  Criteria: Maintains stable weight    Biochemical Data, Medical Tests and Procedures  Monitoring and Evaluation Plan: Glucose/endocrine profile, Electrolyte/renal panel  Electrolyte and Renal Panel: Sodium, Potassium, Phosphorus  Criteria: Electrolytes WNL - met  Glucose/Endocrine Profile: Glucose, casual  Criteria: BG within desirable range - not met    Nutrition Focused Physical Findings  Monitoring and Evaluation Plan: Skin  Skin: Impaired wound healing  Criteria: Promote wound healing through adequate nutrition - not met       Time Spent/Follow-up Reminder:   Time Spent (min): 30 minutes  Last Date of Nutrition Visit: 04/02/24  Nutrition Follow-Up Needed?: 5-7 days  Follow up Comment: glucerna BID

## 2024-04-02 NOTE — CARE PLAN
The patient's goals for the shift include Pt will have pain controlled with score under 4.    The clinical goals for the shift include Patient will remain hemodynamically stable throughout shift    Patient is -progressing toward goals

## 2024-04-02 NOTE — TELEPHONE ENCOUNTER
Spoke to brice who is not on HIPAA. She states pt will call back to speak to me or have pt give verbal for us to speak to brice.

## 2024-04-02 NOTE — PROGRESS NOTES
Physical Therapy    Physical Therapy Evaluation    Patient Name: Dionicio Dyer  MRN: [id number]  Today's Date: 4/2/2024   Time Calculation  Start Time: 1318  Stop Time: 1330  Time Calculation (min): 12 min    Assessment/Plan   PT Assessment  Evaluation/Treatment Tolerance: Patient tolerated treatment well  End of Session Patient Position: Bed, 2 rail up, Alarm off, not on at start of session  IP OR SWING BED PT PLAN  Inpatient or Swing Bed: Inpatient  PT Plan  PT Plan: PT Eval only  PT Eval Only Reason: No acute PT needs identified  PT Discharge Recommendations: No PT needed after discharge, No further acute PT  PT Recommended Transfer Status: Stand by assist  PT - OK to Discharge: (once deemed medically appropriate)      Subjective   General Visit Information:  General  Reason for Referral: Impaired mobility  Referred By: PT/OT 4/2/24 Malia WBAT in surgical shoe per Dr. Kirby 4/1/24  Past Medical History Relevant to Rehab: HTN, DM, + smoker, osteomyelitis, Greater toe, TKA  Patient Position Received: Bed, 2 rail up, Alarm off, not on at start of session  General Comment: To ED 3/25/24 with right great toe infection. Wet gangrene right hallux.  Osteomyeliitis right hallux. To OR 4/1/24 for partial 1st ray aputation, removal hallux and metatarsal  head . XR 4/1/24 Right foot pending; 3/27/2 Left hand question foreign body; VITALIY 3/25/24 Right foot large aiar filled soft tissue ulcer medial aspect right great toe  Home Living:  Home Living  Home Living Comments: Per patient report resides with spouse in 2 story home with basement , bedroom/bathroom 2nd floor with handrial. Has full bathroom 1st floor. Plan is to sleep in recliner 1st floor. 4 steps with handrail Owns crutch, sW. Walk in shower 2nd floor no seat no grab bar.  Prior Level of Function:  Prior Function Per Pt/Caregiver Report  Prior Function Comments: Per patient report independent in mobility, ADLs and IADLs without assisive device. Denies nay falls.  Drives. Works construction  Precautions:  Precautions  LE Weight Bearing Status:  (WBAT in surgical shoe)  Prosthesis/Orthosis Used:  (surgical shoe donned independently)       Objective   Pain:  Pain Assessment  Pain Score: 5 - Moderate pain  Pain Location: Foot  Pain Orientation: Right  Cognition:  Cognition  Orientation Level: Oriented X4    General Assessments:  General Observation  General Observation: Patient lying in bed. Agreeable to PT/OT. Denies any concerns iwth return home. Dressing right foot       Activity Tolerance  Endurance:  (Good)    Sensation  Sensation Comment: Decreased protective weight bearing  right foot    Static Sitting Balance  Static Sitting-Comment/Number of Minutes: Good  Dynamic Sitting Balance  Dynamic Sitting-Comments: Good    Static Standing Balance  Static Standing-Comment/Number of Minutes: Good  Dynamic Standing Balance  Dynamic Standing-Comments: Good  Functional Assessments:  Bed Mobility  Bed Mobility:  (Supine <> sit independent)    Transfers  Transfer:  (Sit <> stand independent with surgical shoe donned)    Ambulation/Gait Training  Ambulation/Gait Training Performed:  (Patient ambulated without assistive device 100' independently with surgical shoe donned RLE) Verbally reviewed ascending /descending steps 1 at at time Patient voiced understanding.  Extremity/Trunk Assessments:  RUE   RUE : Within Functional Limits  LUE   LUE: Within Functional Limits  RLE   RLE :  (AROM Hip/knee ankle WFL  MMT hip/knee  4/5)  LLE   LLE :  (AROM Hip/knee/ankle WFL MMT 5/5 grossly)  Outcome Measures:  [facility] Basic Mobility  Turning from your back to your side while in a flat bed without using bedrails: None  Moving from lying on your back to sitting on the side of a flat bed without using bedrails: None  Moving to and from bed to chair (including a wheelchair): None  Standing up from a chair using your arms (e.g. wheelchair or bedside chair): None  To walk in hospital room: None  Climbing  3-5 steps with railing: None  Basic Mobility - Total Score: 24

## 2024-04-02 NOTE — TELEPHONE ENCOUNTER
Pt given verbal to speak to brice will call back with more information to get this extended with provider being out.

## 2024-04-02 NOTE — PROGRESS NOTES
"Dionicio Dyer is a 58 y.o. male on day 7 of admission presenting with Diabetic foot ulcer associated with diabetes mellitus due to underlying condition, unspecified laterality, unspecified part of foot, unspecified ulcer stage (CMS/[facility]).    Subjective   Patient is s/p POD #1 of right partial first ray amputation for treatment of diabetic foot infection and osteomyelitis.  He was having issues with pain control which has resolved with adjusted oral medication.  Pain is being managed with Percocet.  His dressing remains clean , dry, and intact.  Denies any constitutional symptoms at this time.  No other pedal complaint.        Physical Exam    Objective     REVIEW OF SYSTEMS    All negative except what is listed in HPI     Objective:   Neurovascular status remains unchanged.  Surgical dressing remains intact.  No pain to palpation of posterior right calf    Last Recorded Vitals  Blood pressure 168/77, pulse 60, temperature 36.7 °C (98.1 °F), temperature source Temporal, resp. rate 18, height 1.803 m (5' 11\"), weight 76.7 kg (169 lb 1.5 oz), SpO2 97 %.    Intake/Output last 3 Shifts:  I/O last 3 completed shifts:  In: 1000 (13 mL/kg) [P.O.:200; I.V.:700 (9.1 mL/kg); IV Piggyback:100]  Out: 350 (4.6 mL/kg) [Urine:350 (0.1 mL/kg/hr)]  Weight: 76.7 kg     Relevant Results  Results for orders placed or performed during the hospital encounter of 03/25/24 (from the past 24 hour(s))   Tissue/Wound Culture/Smear    Specimen: DIGIT FIRST, RIGHT FOOT; Swab   Result Value Ref Range    Tissue/Wound Culture/Smear No growth to date     Gram Stain (2+) Few Polymorphonuclear leukocytes     Gram Stain No organisms seen    Tissue/Wound Culture/Smear    Specimen: DIGIT FIRST, RIGHT FOOT; Tissue   Result Value Ref Range    Tissue/Wound Culture/Smear No growth to date     Gram Stain (1+) Rare Polymorphonuclear leukocytes     Gram Stain No organisms seen    POCT GLUCOSE   Result Value Ref Range    POCT Glucose 86 74 - 99 mg/dL   SST TOP "   Result Value Ref Range    Extra Tube Hold for add-ons.    Magnesium   Result Value Ref Range    Magnesium 2.05 1.60 - 2.40 mg/dL   Basic Metabolic Panel   Result Value Ref Range    Glucose 138 (H) 74 - 99 mg/dL    Sodium 139 136 - 145 mmol/L    Potassium 3.8 3.5 - 5.3 mmol/L    Chloride 109 (H) 98 - 107 mmol/L    Bicarbonate 24 21 - 32 mmol/L    Anion Gap 10 10 - 20 mmol/L    Urea Nitrogen 17 6 - 23 mg/dL    Creatinine 0.87 0.50 - 1.30 mg/dL    eGFR >90 >60 mL/min/1.73m*2    Calcium 8.5 (L) 8.6 - 10.3 mg/dL   CBC   Result Value Ref Range    WBC 12.8 (H) 4.4 - 11.3 x10*3/uL    nRBC 0.0 0.0 - 0.0 /100 WBCs    RBC 4.63 4.50 - 5.90 x10*6/uL    Hemoglobin 13.4 (L) 13.5 - 17.5 g/dL    Hematocrit 39.8 (L) 41.0 - 52.0 %    MCV 86 80 - 100 fL    MCH 28.9 26.0 - 34.0 pg    MCHC 33.7 32.0 - 36.0 g/dL    RDW 13.5 11.5 - 14.5 %    Platelets 216 150 - 450 x10*3/uL   POCT GLUCOSE   Result Value Ref Range    POCT Glucose 144 (H) 74 - 99 mg/dL   POCT GLUCOSE   Result Value Ref Range    POCT Glucose 147 (H) 74 - 99 mg/dL   POCT GLUCOSE   Result Value Ref Range    POCT Glucose 201 (H) 74 - 99 mg/dL              Assessment/Plan   Principal Problem:    Diabetic foot ulcer associated with diabetes mellitus due to underlying condition, unspecified laterality, unspecified part of foot, unspecified ulcer stage (CMS/[facility])  Active Problems:    Osteomyelitis of great toe (CMS/[facility])    Diabetes mellitus, type 2 (CMS/[facility])    Current smoker    HTN (hypertension)    # S/P right partial 1st ray amputation   # DM foot infection with osteomyelitis    - Patient examined and evaluated  - Dressing remains intact.  Reinforcement orders placed  - Pain managed with oral percocet  - Intraoperative cultures and pathology pending  - Antibiotics per ID  - Will change dressing tomorrow  - Podiatry will follow while in house.  Do not anticipate any further surgery during this hospital stay from Podiatry.  - Please reach out with any questions or concerns.         Soraya Kirby DPM    Off note, use of vocal Dragon dictation system was used to dictate this document.  All proper spelling and grammatical errors may not have been corrected prior to final submission.

## 2024-04-03 ENCOUNTER — DOCUMENTATION (OUTPATIENT)
Dept: INFUSION THERAPY | Age: 59
End: 2024-04-03
Payer: COMMERCIAL

## 2024-04-03 ENCOUNTER — DOCUMENTATION (OUTPATIENT)
Dept: HOME HEALTH SERVICES | Facility: HOME HEALTH | Age: 59
End: 2024-04-03
Payer: COMMERCIAL

## 2024-04-03 ENCOUNTER — HOME INFUSION (OUTPATIENT)
Dept: INFUSION THERAPY | Age: 59
End: 2024-04-03
Payer: COMMERCIAL

## 2024-04-03 ENCOUNTER — APPOINTMENT (OUTPATIENT)
Dept: RADIOLOGY | Facility: HOSPITAL | Age: 59
End: 2024-04-03
Payer: COMMERCIAL

## 2024-04-03 ENCOUNTER — HOME HEALTH ADMISSION (OUTPATIENT)
Dept: HOME HEALTH SERVICES | Facility: HOME HEALTH | Age: 59
End: 2024-04-03
Payer: COMMERCIAL

## 2024-04-03 VITALS
TEMPERATURE: 97 F | SYSTOLIC BLOOD PRESSURE: 140 MMHG | WEIGHT: 169.09 LBS | HEIGHT: 71 IN | RESPIRATION RATE: 16 BRPM | HEART RATE: 69 BPM | DIASTOLIC BLOOD PRESSURE: 68 MMHG | OXYGEN SATURATION: 98 % | BODY MASS INDEX: 23.67 KG/M2

## 2024-04-03 LAB
ANION GAP SERPL CALC-SCNC: 9 MMOL/L (ref 10–20)
BACTERIA SPEC CULT: NORMAL
BACTERIA SPEC CULT: NORMAL
BUN SERPL-MCNC: 18 MG/DL (ref 6–23)
CALCIUM SERPL-MCNC: 8.8 MG/DL (ref 8.6–10.3)
CHLORIDE SERPL-SCNC: 108 MMOL/L (ref 98–107)
CO2 SERPL-SCNC: 25 MMOL/L (ref 21–32)
CREAT SERPL-MCNC: 0.8 MG/DL (ref 0.5–1.3)
EGFRCR SERPLBLD CKD-EPI 2021: >90 ML/MIN/1.73M*2
ERYTHROCYTE [DISTWIDTH] IN BLOOD BY AUTOMATED COUNT: 13.6 % (ref 11.5–14.5)
GLUCOSE BLD MANUAL STRIP-MCNC: 125 MG/DL (ref 74–99)
GLUCOSE BLD MANUAL STRIP-MCNC: 165 MG/DL (ref 74–99)
GLUCOSE BLD MANUAL STRIP-MCNC: 192 MG/DL (ref 74–99)
GLUCOSE BLD MANUAL STRIP-MCNC: 198 MG/DL (ref 74–99)
GLUCOSE SERPL-MCNC: 134 MG/DL (ref 74–99)
GRAM STN SPEC: NORMAL
HCT VFR BLD AUTO: 38.8 % (ref 41–52)
HGB BLD-MCNC: 12.9 G/DL (ref 13.5–17.5)
HOLD SPECIMEN: NORMAL
MCH RBC QN AUTO: 28.9 PG (ref 26–34)
MCHC RBC AUTO-ENTMCNC: 33.2 G/DL (ref 32–36)
MCV RBC AUTO: 87 FL (ref 80–100)
NRBC BLD-RTO: 0 /100 WBCS (ref 0–0)
PLATELET # BLD AUTO: 191 X10*3/UL (ref 150–450)
POTASSIUM SERPL-SCNC: 4.1 MMOL/L (ref 3.5–5.3)
RBC # BLD AUTO: 4.47 X10*6/UL (ref 4.5–5.9)
SODIUM SERPL-SCNC: 138 MMOL/L (ref 136–145)
WBC # BLD AUTO: 10.8 X10*3/UL (ref 4.4–11.3)

## 2024-04-03 PROCEDURE — C1751 CATH, INF, PER/CENT/MIDLINE: HCPCS

## 2024-04-03 PROCEDURE — 82374 ASSAY BLOOD CARBON DIOXIDE: CPT | Performed by: INTERNAL MEDICINE

## 2024-04-03 PROCEDURE — 2500000001 HC RX 250 WO HCPCS SELF ADMINISTERED DRUGS (ALT 637 FOR MEDICARE OP): Performed by: PODIATRIST

## 2024-04-03 PROCEDURE — 36569 INSJ PICC 5 YR+ W/O IMAGING: CPT

## 2024-04-03 PROCEDURE — 2720000007 HC OR 272 NO HCPCS

## 2024-04-03 PROCEDURE — 2500000004 HC RX 250 GENERAL PHARMACY W/ HCPCS (ALT 636 FOR OP/ED): Performed by: INTERNAL MEDICINE

## 2024-04-03 PROCEDURE — 99239 HOSP IP/OBS DSCHRG MGMT >30: CPT | Performed by: INTERNAL MEDICINE

## 2024-04-03 PROCEDURE — 2500000001 HC RX 250 WO HCPCS SELF ADMINISTERED DRUGS (ALT 637 FOR MEDICARE OP): Performed by: INTERNAL MEDICINE

## 2024-04-03 PROCEDURE — 2500000002 HC RX 250 W HCPCS SELF ADMINISTERED DRUGS (ALT 637 FOR MEDICARE OP, ALT 636 FOR OP/ED): Performed by: PODIATRIST

## 2024-04-03 PROCEDURE — 2500000005 HC RX 250 GENERAL PHARMACY W/O HCPCS: Performed by: INTERNAL MEDICINE

## 2024-04-03 PROCEDURE — S4991 NICOTINE PATCH NONLEGEND: HCPCS | Performed by: PODIATRIST

## 2024-04-03 PROCEDURE — 85027 COMPLETE CBC AUTOMATED: CPT | Performed by: INTERNAL MEDICINE

## 2024-04-03 PROCEDURE — 2500000004 HC RX 250 GENERAL PHARMACY W/ HCPCS (ALT 636 FOR OP/ED): Performed by: PODIATRIST

## 2024-04-03 PROCEDURE — 82947 ASSAY GLUCOSE BLOOD QUANT: CPT

## 2024-04-03 PROCEDURE — 36415 COLL VENOUS BLD VENIPUNCTURE: CPT | Performed by: INTERNAL MEDICINE

## 2024-04-03 RX ORDER — OXYCODONE AND ACETAMINOPHEN 5; 325 MG/1; MG/1
1 TABLET ORAL EVERY 6 HOURS PRN
Qty: 20 TABLET | Refills: 0 | Status: SHIPPED | OUTPATIENT
Start: 2024-04-03

## 2024-04-03 RX ORDER — GABAPENTIN 600 MG/1
600 TABLET ORAL 3 TIMES DAILY
Qty: 90 TABLET | Refills: 0 | Status: SHIPPED | OUTPATIENT
Start: 2024-04-03 | End: 2024-05-03

## 2024-04-03 RX ORDER — IBUPROFEN 200 MG
1 TABLET ORAL DAILY
Qty: 30 PATCH | Refills: 0 | Status: SHIPPED | OUTPATIENT
Start: 2024-04-04 | End: 2024-05-04

## 2024-04-03 RX ADMIN — PIPERACILLIN SODIUM AND TAZOBACTAM SODIUM 3.38 G: 3; .375 INJECTION, SOLUTION INTRAVENOUS at 11:24

## 2024-04-03 RX ADMIN — HYDROCHLOROTHIAZIDE 25 MG: 25 TABLET ORAL at 09:23

## 2024-04-03 RX ADMIN — GABAPENTIN 600 MG: 300 CAPSULE ORAL at 09:23

## 2024-04-03 RX ADMIN — PIPERACILLIN SODIUM AND TAZOBACTAM SODIUM 3.38 G: 3; .375 INJECTION, SOLUTION INTRAVENOUS at 19:03

## 2024-04-03 RX ADMIN — SODIUM CHLORIDE 10 ML: 9 INJECTION, SOLUTION INTRAVENOUS at 05:52

## 2024-04-03 RX ADMIN — PIPERACILLIN SODIUM AND TAZOBACTAM SODIUM 3.38 G: 3; .375 INJECTION, SOLUTION INTRAVENOUS at 05:52

## 2024-04-03 RX ADMIN — LIDOCAINE HYDROCHLORIDE 10 MG: 10 INJECTION, SOLUTION INFILTRATION; PERINEURAL at 10:45

## 2024-04-03 RX ADMIN — NICOTINE 1 PATCH: 21 PATCH, EXTENDED RELEASE TRANSDERMAL at 09:23

## 2024-04-03 RX ADMIN — OXYCODONE HYDROCHLORIDE AND ACETAMINOPHEN 1 TABLET: 5; 325 TABLET ORAL at 11:22

## 2024-04-03 RX ADMIN — GABAPENTIN 600 MG: 300 CAPSULE ORAL at 15:23

## 2024-04-03 RX ADMIN — OXYCODONE HYDROCHLORIDE AND ACETAMINOPHEN 1 TABLET: 5; 325 TABLET ORAL at 02:31

## 2024-04-03 RX ADMIN — MORPHINE SULFATE 4 MG: 4 INJECTION, SOLUTION INTRAMUSCULAR; INTRAVENOUS at 04:30

## 2024-04-03 RX ADMIN — INSULIN LISPRO 2 UNITS: 100 INJECTION, SOLUTION INTRAVENOUS; SUBCUTANEOUS at 16:37

## 2024-04-03 RX ADMIN — MORPHINE SULFATE 4 MG: 4 INJECTION, SOLUTION INTRAMUSCULAR; INTRAVENOUS at 00:05

## 2024-04-03 RX ADMIN — OXYCODONE HYDROCHLORIDE AND ACETAMINOPHEN 1 TABLET: 5; 325 TABLET ORAL at 15:23

## 2024-04-03 RX ADMIN — OXYCODONE HYDROCHLORIDE AND ACETAMINOPHEN 1 TABLET: 5; 325 TABLET ORAL at 07:04

## 2024-04-03 RX ADMIN — OXYCODONE HYDROCHLORIDE AND ACETAMINOPHEN 1 TABLET: 5; 325 TABLET ORAL at 19:24

## 2024-04-03 RX ADMIN — LISINOPRIL 20 MG: 20 TABLET ORAL at 09:23

## 2024-04-03 ASSESSMENT — PAIN - FUNCTIONAL ASSESSMENT
PAIN_FUNCTIONAL_ASSESSMENT: 0-10

## 2024-04-03 ASSESSMENT — PAIN DESCRIPTION - LOCATION
LOCATION: FOOT

## 2024-04-03 ASSESSMENT — PAIN DESCRIPTION - ORIENTATION
ORIENTATION: RIGHT

## 2024-04-03 ASSESSMENT — COGNITIVE AND FUNCTIONAL STATUS - GENERAL: DAILY ACTIVITIY SCORE: 24

## 2024-04-03 ASSESSMENT — PAIN SCALES - GENERAL
PAINLEVEL_OUTOF10: 9
PAINLEVEL_OUTOF10: 5 - MODERATE PAIN
PAINLEVEL_OUTOF10: 4
PAINLEVEL_OUTOF10: 4
PAINLEVEL_OUTOF10: 8
PAINLEVEL_OUTOF10: 7
PAINLEVEL_OUTOF10: 7
PAINLEVEL_OUTOF10: 8
PAINLEVEL_OUTOF10: 7

## 2024-04-03 ASSESSMENT — ENCOUNTER SYMPTOMS
RESPIRATORY NEGATIVE: 1
CARDIOVASCULAR NEGATIVE: 1
WOUND: 1
GASTROINTESTINAL NEGATIVE: 1

## 2024-04-03 NOTE — PROGRESS NOTES
Primary MD: Callum Ruano MD    Reason For Consult  Diabetic foot infection  Right great toe osteomyelitis        History Of Present Illness  Dionicio Dyer is a 58 y.o. male      Right great toe osteomyelitis  Diabetic right foot infection status postdebridement  Cultures showing haemophilus mixed anaerobic bacteria        MRI -IMPRESSION:  Large air-filled soft tissue ulcer at the medial aspect of the great  toe with extension into the distal aspect of the 1st proximal  phalanx. There is also bone destruction at the distal tuft of the  great toe. There is diffuse bone marrow edema and enhancement in the  1st proximal and distal phalanx. These findings are consistent with  osteomyelitis.        4/1/2024     Procedures  Right partial first ray amputation with removal of hallux and metatarsal head right foot  The first metatarsal head was then examined and noted to be soft with areas of gray discoloration     Review of Systems   Respiratory: Negative.     Cardiovascular: Negative.    Gastrointestinal: Negative.    Skin:  Positive for wound.        Physical Exam  Cardiovascular:      Heart sounds: Normal heart sounds. No murmur heard.  Pulmonary:      Effort: Pulmonary effort is normal. No respiratory distress.      Breath sounds: Normal breath sounds. No wheezing, rhonchi or rales.   Abdominal:      General: Abdomen is flat. Bowel sounds are normal. There is no distension.      Tenderness: There is no abdominal tenderness. There is no guarding.   Musculoskeletal:      Comments: Right great toe is necrotic          Range of Vitals (last 24 hours)  Heart Rate:  [55-67]   Temp:  [36.4 °C (97.5 °F)-36.9 °C (98.4 °F)]   Resp:  [16]   BP: (144-167)/(77-85)   SpO2:  [96 %-99 %]     Relevant Results  Results from last 72 hours   Lab Units 04/03/24  0617   WBC AUTO x10*3/uL 10.8   HEMOGLOBIN g/dL 12.9*   HEMATOCRIT % 38.8*   PLATELETS AUTO x10*3/uL 191       Results from last 72 hours   Lab Units 04/03/24  0617    SODIUM mmol/L 138   POTASSIUM mmol/L 4.1   CHLORIDE mmol/L 108*   CO2 mmol/L 25   BUN mg/dL 18   CREATININE mg/dL 0.80   GLUCOSE mg/dL 134*   CALCIUM mg/dL 8.8   ANION GAP mmol/L 9*   EGFR mL/min/1.73m*2 >90             Estimated Creatinine Clearance: 105.9 mL/min (by C-G formula based on SCr of 0.8 mg/dL).  C-Reactive Protein   Date/Time Value Ref Range Status   03/25/2024 01:16 PM 2.10 (H) <1.00 mg/dL Final     CRP   Date/Time Value Ref Range Status   10/04/2019 08:58 AM 0.48 mg/dL Final     Comment:     REF VALUE  < 1.00       Sedimentation Rate   Date/Time Value Ref Range Status   10/04/2019 08:58 AM 11 0 - 20 mm/h Final       Cultures  Susceptibility data from last 14 days.  Collected Specimen Info Organism Ceftriaxone Penicillin Tetracycline Vancomycin   04/01/24 Swab from DIGIT FIRST, RIGHT FOOT Mixed Skin Microorganisms        04/01/24 Tissue from DIGIT FIRST, RIGHT FOOT Mixed Gram-Positive Bacteria        03/25/24 Tissue/Biopsy from Diabetic Foot Ulcer Streptococcus anginosus group S S R S     Haemophilus parainfluenzae         Mixed Anaerobic Bacteria                Assessment/Plan     Diabetic foot infection  Right great toe osteomyelitis    Had Right partial first ray amputation with removal of hallux and metatarsal head right foot  Await cult  Tissue/Wound Culture/Smear  Order: [id number]  Collected 4/1/2024 17:56       Status: Final result       Visible to patient: No (inaccessible [facility])       Dx: Osteomyelitis of great toe (CMS/[facility]);...    Specimen Information: DIGIT FIRST, RIGHT FOOT; Tissue   0 Result Notes  Tissue/Wound Culture/Smear (1+) Rare Mixed Gram-Positive Bacteria               Gram Stain (1+) Rare Polymorphonuclear leukocytes      No organisms seen              Resulting Agency: [facility]     On Zosyn plan 6 week

## 2024-04-03 NOTE — PROGRESS NOTES
REVIEWED PT INFO AS CORRECT.   DX...  diabetic right foot infection, right great toe osteomyelitis  REVIEWED ALLERGIES... nka  PMH...DM2, htn,   NO MEDICATION INTERACTIONS.  REVIEWED RELEVANT BASELINE VALUES  LAB ARE ... Weekly CBC, CMP, and CRP with results to dr Lawson   PT HAS …..  sl picc 47cm placed 4/3/24 ...  FLUSH PER [facility] PROTOCOL.  CONTINUE MED THRU TENTATIVE STOP DATE …. Piperacillin/Tazobactam 3.375gm iv q8h via elastomeric thru 5/14/24  CARE PLAN DONE TODAY    Patient is a new admit to [facility] for receipt of Zosyn q8h thru 5/14/24 for a diabetic foot infection with right great toe osteomyelitis. Patient presented with worsening R toe pain, blackening and foul purulent drainage. Patient seen at [facility] last last year with infection of same toe and completed 3 weeks of IV abx followed by 90days of oral abx. Imaging showed osteomyelitis of proximal and distal phalanges of right great toe. Right partial first ray amputation with removal of hallux and metatarsal head of right foot done. ID recommends 6 weeks of zosyn.    Weekly labs are ordered as above with results to dr Lawson/[facility] ID.    Confirmed with Dr Hill that Zosyn would continue as q8h at home over 30 minutes (on extended q8h infusions inpatient).    Processed fill for 21 x zosyn HP for mix and straight delivery 4/3/24 to cover doses 4/4 thru 4/10/24.    Follow up 4/9/24 with next fill ovn. Check progress and labs.

## 2024-04-03 NOTE — PROGRESS NOTES
"Dionicio Dyer is a 58 y.o. male on day 8 of admission presenting with Diabetic foot ulcer associated with diabetes mellitus due to underlying condition, unspecified laterality, unspecified part of foot, unspecified ulcer stage (CMS/[facility]).    Subjective   Patient is s/p POD #1 of right partial first ray amputation for treatment of diabetic foot infection and osteomyelitis.  Pain well controlled.  Dressing intact.  Denies any constitutional symptoms at this time. No other pedal complaint.        Physical Exam    Objective     REVIEW OF SYSTEMS  All negative except what is listed in HPI     Objective:    Neurovascular status remains unchanged from prior exam.  Surgical dressing removed and incision noted to be well intact with mild sharmaine site edema with no erythema.  Normal serosanguinous drainage noted.  Mild tenderness to palpation of surgical site.  No pain to palpation of posterior calf.  Patient able to ambulate in surgical shoe.       Last Recorded Vitals  Blood pressure 164/85, pulse 67, temperature 36.9 °C (98.4 °F), resp. rate 16, height 1.803 m (5' 11\"), weight 76.7 kg (169 lb 1.5 oz), SpO2 97 %.    Intake/Output last 3 Shifts:  I/O last 3 completed shifts:  In: 790 (10.3 mL/kg) [P.O.:680; IV Piggyback:110]  Out: 350 (4.6 mL/kg) [Urine:350 (0.1 mL/kg/hr)]  Weight: 76.7 kg     Relevant Results  Results for orders placed or performed during the hospital encounter of 03/25/24 (from the past 24 hour(s))   POCT GLUCOSE   Result Value Ref Range    POCT Glucose 155 (H) 74 - 99 mg/dL   POCT GLUCOSE   Result Value Ref Range    POCT Glucose 125 (H) 74 - 99 mg/dL   Basic Metabolic Panel   Result Value Ref Range    Glucose 134 (H) 74 - 99 mg/dL    Sodium 138 136 - 145 mmol/L    Potassium 4.1 3.5 - 5.3 mmol/L    Chloride 108 (H) 98 - 107 mmol/L    Bicarbonate 25 21 - 32 mmol/L    Anion Gap 9 (L) 10 - 20 mmol/L    Urea Nitrogen 18 6 - 23 mg/dL    Creatinine 0.80 0.50 - 1.30 mg/dL    eGFR >90 >60 mL/min/1.73m*2    Calcium " 8.8 8.6 - 10.3 mg/dL   CBC   Result Value Ref Range    WBC 10.8 4.4 - 11.3 x10*3/uL    nRBC 0.0 0.0 - 0.0 /100 WBCs    RBC 4.47 (L) 4.50 - 5.90 x10*6/uL    Hemoglobin 12.9 (L) 13.5 - 17.5 g/dL    Hematocrit 38.8 (L) 41.0 - 52.0 %    MCV 87 80 - 100 fL    MCH 28.9 26.0 - 34.0 pg    MCHC 33.2 32.0 - 36.0 g/dL    RDW 13.6 11.5 - 14.5 %    Platelets 191 150 - 450 x10*3/uL   SST TOP   Result Value Ref Range    Extra Tube Hold for add-ons.    POCT GLUCOSE   Result Value Ref Range    POCT Glucose 165 (H) 74 - 99 mg/dL   POCT GLUCOSE   Result Value Ref Range    POCT Glucose 192 (H) 74 - 99 mg/dL            Assessment/Plan   Principal Problem:    Diabetic foot ulcer associated with diabetes mellitus due to underlying condition, unspecified laterality, unspecified part of foot, unspecified ulcer stage (CMS/[facility])  Active Problems:    Osteomyelitis of great toe (CMS/[facility])    Diabetes mellitus, type 2 (CMS/[facility])    Current smoker    HTN (hypertension)      # S/P right partial 1st ray amputation   # DM foot infection with osteomyelitis     - Patient examined and evaluated  - Dressing removed and changed.  Dressed with betadine, adaptic, 4x4, abd, kerlix, ace   - Pain managed with oral percocet  - Intraoperative cultures and pathology pending  - Antibiotics per ID  - Keep dressing intact   - Patient is cleared from Podiatry standpoint for discharge with follow up in the wound care center   - Please reach out with any questions or concerns.     Signing off on patient            Soraya Kirby DPM     Off note, use of vocal Dragon dictation system was used to dictate this document.  All proper spelling and grammatical errors may not have been corrected prior to final submission.        Soraya Kirby DPM

## 2024-04-03 NOTE — HH CARE COORDINATION
Home Care received a Referral for Infusion and Nursing. We have processed the referral for a Start of Care on 4/4/2024.     If you have any questions or concerns, please feel free to contact us at [phone]. Follow the prompts, enter your five digit zip code, and you will be directed to your care team on WEST 1.

## 2024-04-03 NOTE — PROGRESS NOTES
ADULT 58Y.O. MALE ADMITTED TO [facility] WITH ORDER FOR IV ZOSYN THERAPY..PT HAS A SL PICC LINE IV ACCESS WITH ORDER TO FLUSH AND CATH DRSG PER [facility] PROTOCOL..INFUSION EVERY 8HRS VIA HP...SOC TOMORROW... SENDING STND SUPPLIES AND FLUSHES TO MATCH ORDERED DOSES PLUS INITIAL EXTRAS FOR DELIVERY TONIGHT... SPOKE W/PT AND ADDRESS CONFIRMED, LEFT DETAILED INFORMATION... PT STILL IN HOSPITAL WILL BE HOME AFTER 7PM...OK FOR DELIVERY CLOSER TO 9PM..NO QUESTIONS FOR [facility] AT THIS TIME...   
family member

## 2024-04-03 NOTE — PROGRESS NOTES
Rx for iv atb noted.  I spoke w/ dr england- pt will not need home dressing changes or wound are. She has appt w/ the pt for Friday in the wound center and she changed his dressing thi yuan. Pt to go for picc line soon. Pt will need soc tonight for [facility] or go home after tonights iv dose at 8:00pm.  Pt will get iv zoysn at 1200hr today. Pt has 100% coverage/  [facility]. Pt has no pt/ot needs. Awaiting [facility] orders.     [facility] confirmed iv soc tomorrow am. Pt will discharge after iv dose this evening.   Pt updated on [facility] iv soc .

## 2024-04-03 NOTE — DISCHARGE SUMMARY
DISCHARGE DIAGNOSIS     Diabetic foot ulcer of R 1st digit c/b wet gangrene and OM s/p partial R 1st ray amputation  DM2 with above complication  Tobacco use disorder  HTN    HOSPITAL COURSE AND DETAILS     Diabetic foot ulcer of R 1st digit c/b wet gangrene and OM s/p partial R 1st ray amputation  -p/w worsening R toe pain, skin necrosis, malodorous purulent drainage  -MRI R foot showed 1st digit OM, as well as possible metatarsal OM  -wound culture growing strep anginosus, haemophilus parainfluenzae. Bcx ngtd. Operative cx did not show growth, but had received extensive iv abx prior to this.  -ENRICO showed no sig PAD  -podiatry saw, s/p partial R 1st ray resection on 4/1  -Okay for WBAT in surgical shoe  -ID saw here, cont IV Zosyn x6w on discharge. Had RUE PICC placed. Weekly labs ordered. Fu with ID as outpt.  -pain control, sent Rx for short course Percocet  -will fu in wound care center on 4/4  -pt/ot saw, no needs as outpt for therapy  -will send with [facility] RN for IV abx     DM2  -A1C 8.9  -cont home regimen     Tobacco use disorder  -still smoking 1.5-2ppd  -cessation counseling  -nrt with patch, sent Rx     HTN  -cont home meds     Stable for dc to home. Total time spent on discharge services 31 minutes.        DISCHARGE PHYSICAL EXAM     Last Recorded Vitals:  Vitals:    04/02/24 1911 04/03/24 0003 04/03/24 0801 04/03/24 1146   BP: 158/79 144/79 167/77 154/80   BP Location:       Patient Position:       Pulse: 64 55 65 66   Resp:       Temp: 36.6 °C (97.9 °F) 36.6 °C (97.9 °F) 36.4 °C (97.5 °F) 36.6 °C (97.9 °F)   TempSrc:       SpO2: 97% 96% 97% 99%   Weight:       Height:           Physical Exam:  GEN: healthy appearing, appears stated age, NAD  CV: RRR, no m/r/g, no LE edema  LUNGS: CTAB, no w/r/c  NEURO: A+Ox3, no FND  PSYCH: appropriate mood, affect  MSK: R foot wrapped    PERTINENT LABS AND IMAGING     Results for orders placed or performed during the hospital encounter of 03/25/24 (from the past 96  hour(s))   POCT GLUCOSE   Result Value Ref Range    POCT Glucose 169 (H) 74 - 99 mg/dL   POCT GLUCOSE   Result Value Ref Range    POCT Glucose 134 (H) 74 - 99 mg/dL   POCT GLUCOSE   Result Value Ref Range    POCT Glucose 119 (H) 74 - 99 mg/dL   POCT GLUCOSE   Result Value Ref Range    POCT Glucose 131 (H) 74 - 99 mg/dL   POCT GLUCOSE   Result Value Ref Range    POCT Glucose 129 (H) 74 - 99 mg/dL   POCT GLUCOSE   Result Value Ref Range    POCT Glucose 182 (H) 74 - 99 mg/dL   CBC   Result Value Ref Range    WBC 11.2 4.4 - 11.3 x10*3/uL    nRBC 0.0 0.0 - 0.0 /100 WBCs    RBC 4.68 4.50 - 5.90 x10*6/uL    Hemoglobin 13.8 13.5 - 17.5 g/dL    Hematocrit 40.5 (L) 41.0 - 52.0 %    MCV 87 80 - 100 fL    MCH 29.5 26.0 - 34.0 pg    MCHC 34.1 32.0 - 36.0 g/dL    RDW 13.4 11.5 - 14.5 %    Platelets 235 150 - 450 x10*3/uL   Basic metabolic panel   Result Value Ref Range    Glucose 103 (H) 74 - 99 mg/dL    Sodium 141 136 - 145 mmol/L    Potassium 3.9 3.5 - 5.3 mmol/L    Chloride 111 (H) 98 - 107 mmol/L    Bicarbonate 24 21 - 32 mmol/L    Anion Gap 10 10 - 20 mmol/L    Urea Nitrogen 14 6 - 23 mg/dL    Creatinine 0.82 0.50 - 1.30 mg/dL    eGFR >90 >60 mL/min/1.73m*2    Calcium 8.8 8.6 - 10.3 mg/dL   Coagulation Screen   Result Value Ref Range    Protime 12.8 9.8 - 12.8 seconds    INR 1.1 0.9 - 1.1    aPTT 34 27 - 38 seconds   POCT GLUCOSE   Result Value Ref Range    POCT Glucose 104 (H) 74 - 99 mg/dL   POCT GLUCOSE   Result Value Ref Range    POCT Glucose 105 (H) 74 - 99 mg/dL   Tissue/Wound Culture/Smear    Specimen: DIGIT FIRST, RIGHT FOOT; Swab   Result Value Ref Range    Tissue/Wound Culture/Smear No growth to date     Gram Stain (2+) Few Polymorphonuclear leukocytes     Gram Stain No organisms seen    Tissue/Wound Culture/Smear    Specimen: DIGIT FIRST, RIGHT FOOT; Tissue   Result Value Ref Range    Tissue/Wound Culture/Smear No growth to date     Gram Stain (1+) Rare Polymorphonuclear leukocytes     Gram Stain No organisms seen     POCT GLUCOSE   Result Value Ref Range    POCT Glucose 86 74 - 99 mg/dL   SST TOP   Result Value Ref Range    Extra Tube Hold for add-ons.    Magnesium   Result Value Ref Range    Magnesium 2.05 1.60 - 2.40 mg/dL   Basic Metabolic Panel   Result Value Ref Range    Glucose 138 (H) 74 - 99 mg/dL    Sodium 139 136 - 145 mmol/L    Potassium 3.8 3.5 - 5.3 mmol/L    Chloride 109 (H) 98 - 107 mmol/L    Bicarbonate 24 21 - 32 mmol/L    Anion Gap 10 10 - 20 mmol/L    Urea Nitrogen 17 6 - 23 mg/dL    Creatinine 0.87 0.50 - 1.30 mg/dL    eGFR >90 >60 mL/min/1.73m*2    Calcium 8.5 (L) 8.6 - 10.3 mg/dL   CBC   Result Value Ref Range    WBC 12.8 (H) 4.4 - 11.3 x10*3/uL    nRBC 0.0 0.0 - 0.0 /100 WBCs    RBC 4.63 4.50 - 5.90 x10*6/uL    Hemoglobin 13.4 (L) 13.5 - 17.5 g/dL    Hematocrit 39.8 (L) 41.0 - 52.0 %    MCV 86 80 - 100 fL    MCH 28.9 26.0 - 34.0 pg    MCHC 33.7 32.0 - 36.0 g/dL    RDW 13.5 11.5 - 14.5 %    Platelets 216 150 - 450 x10*3/uL   POCT GLUCOSE   Result Value Ref Range    POCT Glucose 144 (H) 74 - 99 mg/dL   POCT GLUCOSE   Result Value Ref Range    POCT Glucose 147 (H) 74 - 99 mg/dL   POCT GLUCOSE   Result Value Ref Range    POCT Glucose 201 (H) 74 - 99 mg/dL   POCT GLUCOSE   Result Value Ref Range    POCT Glucose 155 (H) 74 - 99 mg/dL   POCT GLUCOSE   Result Value Ref Range    POCT Glucose 125 (H) 74 - 99 mg/dL   Basic Metabolic Panel   Result Value Ref Range    Glucose 134 (H) 74 - 99 mg/dL    Sodium 138 136 - 145 mmol/L    Potassium 4.1 3.5 - 5.3 mmol/L    Chloride 108 (H) 98 - 107 mmol/L    Bicarbonate 25 21 - 32 mmol/L    Anion Gap 9 (L) 10 - 20 mmol/L    Urea Nitrogen 18 6 - 23 mg/dL    Creatinine 0.80 0.50 - 1.30 mg/dL    eGFR >90 >60 mL/min/1.73m*2    Calcium 8.8 8.6 - 10.3 mg/dL   CBC   Result Value Ref Range    WBC 10.8 4.4 - 11.3 x10*3/uL    nRBC 0.0 0.0 - 0.0 /100 WBCs    RBC 4.47 (L) 4.50 - 5.90 x10*6/uL    Hemoglobin 12.9 (L) 13.5 - 17.5 g/dL    Hematocrit 38.8 (L) 41.0 - 52.0 %    MCV 87 80 - 100  fL    MCH 28.9 26.0 - 34.0 pg    MCHC 33.2 32.0 - 36.0 g/dL    RDW 13.6 11.5 - 14.5 %    Platelets 191 150 - 450 x10*3/uL   SST TOP   Result Value Ref Range    Extra Tube Hold for add-ons.    POCT GLUCOSE   Result Value Ref Range    POCT Glucose 165 (H) 74 - 99 mg/dL        Bedside PICC Imaging   Final Result      XR foot right 3+ views   Final Result   Expected findings post great toe amputation. See discussion above.             MACRO:   None        Signed by: Joseph Schoenberger 4/3/2024 9:50 AM   Dictation workstation:   [id number]      XR hand left 3+ views   Final Result   No definite radiopaque packing material. There is an area of   increased attenuation dorsal to the wrist on the lateral radiograph   which would not be typical for foreign body.             MACRO:   None        Signed by: Joseph Schoenberger 3/29/2024 8:16 AM   Dictation workstation:   [id number]      MR foot right w and wo IV contrast   Final Result   Large air-filled soft tissue ulcer at the medial aspect of the great   toe with extension into the distal aspect of the 1st proximal   phalanx. There is also bone destruction at the distal tuft of the   great toe. There is diffuse bone marrow edema and enhancement in the   1st proximal and distal phalanx. These findings are consistent with   osteomyelitis.        There is diffuse cellulitis of the great toe extending around the 1st   and 2nd metatarsal. Nonspecific edema and fluid is also identified in   the dorsal subcutaneous fat and within the deep soft tissues of the   mid to forefoot. There is no sign of abscess.        Subchondral bone marrow edema in the 1st metatarsal head and neck and   sesamoid bones. These findings may be degenerative in nature though   early changes of osteomyelitis can not be excluded given the adjacent   soft tissue and bony changes.        Mild heterogenous bone marrow edema and enhancement in the 5th   metatarsal head, more likely degenerative.        The  toes are limited by fat saturation failure. There are no   additional sites of diminished T1 weighted signal however.        Multifocal midfoot arthrosis.                  MACRO:   None        Signed by: Cuco Raya 3/26/2024 3:47 PM   Dictation workstation:   [id number]      Vascular US ankle brachial index (ENRICO) without exercise   Final Result      XR toe right 2+ views   Final Result   Osteomyelitis of the proximal and distal phalanges of the great toe   with pathologic fractures.   Signed by Fernando Santos MD          No echocardiogram results found for the past 14 days    DISCHARGE MEDICATIONS        Your medication list        START taking these medications        Instructions Last Dose Given Next Dose Due   nicotine 21 mg/24 hr patch  Commonly known as: Nicoderm CQ  Start taking on: April 4, 2024      Place 1 patch over 24 hours on the skin once daily. Do not start before April 4, 2024.       oxyCODONE-acetaminophen 5-325 mg tablet  Commonly known as: Percocet      Take 1 tablet by mouth every 6 hours if needed for severe pain (7 - 10).       piperacillin-tazo 60  mg/mL injection  Commonly known as: Zosyn      Infuse 50 mL (3.375 g) over 30 minutes into a venous catheter every 8 hours.              CHANGE how you take these medications        Instructions Last Dose Given Next Dose Due   gabapentin 600 mg tablet  Commonly known as: Neurontin      Take 1 tablet (600 mg) by mouth 3 times a day.              CONTINUE taking these medications        Instructions Last Dose Given Next Dose Due   albuterol sulfate 90 mcg/actuation aero powdr breath act w/sensor inhaler  Commonly known as: Proair Digihaler           Lantus U-100 Insulin 100 unit/mL injection  Generic drug: insulin glargine           lisinopriL-hydrochlorothiazide 20-25 mg tablet           metFORMIN 500 mg tablet  Commonly known as: Glucophage                     Where to Get Your Medications        These medications were sent to [facility] - [place], [place] - [address]  [address], [place] [place] [place] [address]      Phone: [phone]   gabapentin 600 mg tablet  nicotine 21 mg/24 hr patch  oxyCODONE-acetaminophen 5-325 mg tablet       These medications were sent to  [facility]  [address], [facility] [address]      Phone: [phone]   piperacillin-tazo 60  mg/mL injection         OUTPATIENT FOLLOW-UP     Future Appointments   Date Time Provider Department Center   4/5/2024  1:30 PM Woundcare Provider DARA Shea

## 2024-04-04 ENCOUNTER — HOME CARE VISIT (OUTPATIENT)
Dept: HOME HEALTH SERVICES | Facility: HOME HEALTH | Age: 59
End: 2024-04-04
Payer: COMMERCIAL

## 2024-04-04 ENCOUNTER — PATIENT OUTREACH (OUTPATIENT)
Dept: CARE COORDINATION | Facility: CLINIC | Age: 59
End: 2024-04-04
Payer: COMMERCIAL

## 2024-04-04 VITALS
HEART RATE: 80 BPM | RESPIRATION RATE: 16 BRPM | DIASTOLIC BLOOD PRESSURE: 64 MMHG | TEMPERATURE: 98 F | SYSTOLIC BLOOD PRESSURE: 110 MMHG

## 2024-04-04 VITALS
HEART RATE: 72 BPM | RESPIRATION RATE: 16 BRPM | TEMPERATURE: 98.1 F | SYSTOLIC BLOOD PRESSURE: 100 MMHG | OXYGEN SATURATION: 97 % | DIASTOLIC BLOOD PRESSURE: 66 MMHG

## 2024-04-04 PROCEDURE — G0299 HHS/HOSPICE OF RN EA 15 MIN: HCPCS

## 2024-04-04 PROCEDURE — 0023 HH SOC

## 2024-04-04 RX ORDER — OXYCODONE HYDROCHLORIDE 5 MG/1
TABLET ORAL
COMMUNITY
Start: 2023-12-06

## 2024-04-04 RX ORDER — IBUPROFEN 200 MG
1 TABLET ORAL DAILY
COMMUNITY
Start: 2023-12-03

## 2024-04-04 ASSESSMENT — ENCOUNTER SYMPTOMS
HIGHEST PAIN SEVERITY IN PAST 24 HOURS: 10/10
PERSON REPORTING PAIN: PATIENT
LOWEST PAIN SEVERITY IN PAST 24 HOURS: 3/10
MUSCLE WEAKNESS: 1
PAIN LOCATION: RIGHT FOOT
CHANGE IN APPETITE: UNCHANGED
PAIN: 1
PAIN LOCATION - PAIN SEVERITY: 7/10
PAIN SEVERITY GOAL: 4/10

## 2024-04-04 NOTE — PROGRESS NOTES
Music Therapy Note    Dionicio Dyer was referred by Pain rounds    Therapy Session  Referral Type: Pain rounds  Visit Type: New visit  Session Start Time: 1325  Session End Time: 1333  Intervention Delivery: In-person  Conflict of Service: None  Family Present for Session: None     Pre-assessment  Pain Score: 4  Mood/Affect: Calm, Appropriate  Verbalized Emotional State: Acceptance         Treatment/Interventions  Music Therapy Interventions: Assessment    Post-assessment  Unable to Assess Reason: Did not provide expressive therapy intervention  Total Session Time (min): 8 minutes    Narrative  Assessment Detail: Pt sitting up in chair talking with PT and their family in bed A. PT doing well at this time with no signs of discomfort or distress. PT stated their pain was well managed and they were working on getting off the Dilaudid in order to manage pain at home. PT without concerns for coping, stress or anxiety at this time.  Intervention: MT introduced service. PT stated they listen to music at home to help cope with other situations they work through. PT doing well at this time and declined services.  Follow-up: MT will not follow up as PT is getting discharged.    Education Documentation  Pain Management, taught by Dayana Pichardo at 4/4/2024 12:48 PM.  Learner: Patient  Readiness: Acceptance  Method: Explanation  Response: Verbalizes Understanding

## 2024-04-04 NOTE — PROGRESS NOTES
Discharge Facility:   [facility] [place]  Discharge Diagnosis:   Osteomyelitis   Admission Date:   3/25/2024  Discharge Date:  4/3/2024    PCP Appointment Date:   TBD  Specialist Appointment Date:    Wound Care 4/5/2024  Hospital Encounter and Summary: Linked   See discharge assessment below for further details Engagement  Call Start Time: 0957 (4/4/2024 10:00 AM)    Medications  Medications reviewed with patient/caregiver?: Yes (pt has [facility] nurse at side to review and administer medications at the time of this call) (4/4/2024 10:00 AM)  Is the patient having any side effects they believe may be caused by any medication additions or changes?: No (4/4/2024 10:00 AM)  Does the patient have all medications ordered at discharge?: Yes (4/4/2024 10:00 AM)  Care Management Interventions: No intervention needed (4/4/2024 10:00 AM)  Prescription Comments: Nicoderm CQ, Oxycodone-acetaminophen, piperacillin-tazo IV, change gabapentin (4/4/2024 10:00 AM)  Is the patient taking all medications as directed (includes completed medication regime)?: Yes (4/4/2024 10:00 AM)    Appointments  Does the patient have a primary care provider?: Yes (4/4/2024 10:00 AM)  Care Management Interventions: Educated patient on importance of making appointment; Advised patient to make appointment (Offered to make follow up appointment for carter but pt requests to make the call/appointment himself today) (4/4/2024 10:00 AM)  Care Management Interventions: Advised patient to keep appointment; Educated on importance of keeping appointment (4/4/2024 10:00 AM)    Self Management  What is the home health agency?: Follow up with  [facility]  Associated diagnoses: Diabetic foot ulcer  associated with diabetes mellitus due to  underlying condition, unspecified  laterality, unspecified part of foot,  unspecified ulcer stage  [address] [facility] [id number] [phone] (4/4/2024 10:00 AM)  Has  home health visited the patient within 72 hours of discharge?: Yes (Nurse with the patinet at time of this CM call) (4/4/2024 10:00 AM)  What Durable Medical Equipment (DME) was ordered?: pt states he has what he needs at this time (4/4/2024 10:00 AM)    Patient Teaching  Does the patient have access to their discharge instructions?: Yes (4/4/2024 10:00 AM)  Patient/Caregiver Education Comments: Pt is well organized and has his discharge papers and a list of instructions near him and is aware of importance of follow up with multiple healthcare providers (4/4/2024 10:00 AM)    Wrap Up  Wrap Up Additional Comments: This CM spoke with pt via phone. Pt reports doing well at home since discharge. New meds reviewed. Patient denies any further discharge questions/needs at this time.   Emphasized that Follow up is needed after discharge to review the hospital recommendations, assess your response to your treatment.  Pt aware of my availability for non-emergent concerns. Contact info provided to patient (4/4/2024 10:00 AM)

## 2024-04-04 NOTE — CARE PLAN
The patient's goals for the shift include Pt will have pain controlled with score under 4.    The clinical goals for the shift include pt will receive picc line today for d/c

## 2024-04-05 ENCOUNTER — OFFICE VISIT (OUTPATIENT)
Dept: WOUND CARE | Facility: CLINIC | Age: 59
End: 2024-04-05
Payer: COMMERCIAL

## 2024-04-05 ENCOUNTER — HOME CARE VISIT (OUTPATIENT)
Dept: HOME HEALTH SERVICES | Facility: HOME HEALTH | Age: 59
End: 2024-04-05
Payer: COMMERCIAL

## 2024-04-05 PROCEDURE — 99213 OFFICE O/P EST LOW 20 MIN: CPT

## 2024-04-05 PROCEDURE — G0299 HHS/HOSPICE OF RN EA 15 MIN: HCPCS

## 2024-04-06 VITALS
DIASTOLIC BLOOD PRESSURE: 62 MMHG | SYSTOLIC BLOOD PRESSURE: 100 MMHG | TEMPERATURE: 98 F | RESPIRATION RATE: 16 BRPM | HEART RATE: 80 BPM

## 2024-04-06 ASSESSMENT — ENCOUNTER SYMPTOMS
HIGHEST PAIN SEVERITY IN PAST 24 HOURS: 9/10
PAIN LOCATION - PAIN SEVERITY: 8/10
APPETITE LEVEL: GOOD
PAIN: 1
PAIN LOCATION - PAIN SEVERITY: 7/10
PERSON REPORTING PAIN: PATIENT
MUSCLE WEAKNESS: 1
LOWEST PAIN SEVERITY IN PAST 24 HOURS: 4/10
HIGHEST PAIN SEVERITY IN PAST 24 HOURS: 10/10
PAIN: 1
PAIN LOCATION: RIGHT FOOT
MUSCLE WEAKNESS: 1
PERSON REPORTING PAIN: PATIENT
CHANGE IN APPETITE: UNCHANGED
PAIN SEVERITY GOAL: 4/10
PAIN LOCATION: RIGHT FOOT
LOWEST PAIN SEVERITY IN PAST 24 HOURS: 4/10
PAIN SEVERITY GOAL: 4/10

## 2024-04-06 ASSESSMENT — ACTIVITIES OF DAILY LIVING (ADL)
ENTERING_EXITING_HOME: INDEPENDENT
OASIS_M1830: 04

## 2024-04-08 ENCOUNTER — HOME CARE VISIT (OUTPATIENT)
Dept: HOME HEALTH SERVICES | Facility: HOME HEALTH | Age: 59
End: 2024-04-08
Payer: COMMERCIAL

## 2024-04-08 ENCOUNTER — LAB REQUISITION (OUTPATIENT)
Dept: LAB | Facility: LAB | Age: 59
End: 2024-04-08
Payer: COMMERCIAL

## 2024-04-08 VITALS
DIASTOLIC BLOOD PRESSURE: 64 MMHG | TEMPERATURE: 97.3 F | RESPIRATION RATE: 18 BRPM | SYSTOLIC BLOOD PRESSURE: 116 MMHG | OXYGEN SATURATION: 95 % | HEART RATE: 93 BPM

## 2024-04-08 DIAGNOSIS — M86.171 OTHER ACUTE OSTEOMYELITIS, RIGHT ANKLE AND FOOT (MULTI): ICD-10-CM

## 2024-04-08 LAB
ALBUMIN SERPL BCP-MCNC: 4.1 G/DL (ref 3.4–5)
ALP SERPL-CCNC: 66 U/L (ref 33–120)
ALT SERPL W P-5'-P-CCNC: 17 U/L (ref 10–52)
ANION GAP SERPL CALC-SCNC: 16 MMOL/L (ref 10–20)
AST SERPL W P-5'-P-CCNC: 20 U/L (ref 9–39)
BILIRUB SERPL-MCNC: 0.3 MG/DL (ref 0–1.2)
BUN SERPL-MCNC: 33 MG/DL (ref 6–23)
CALCIUM SERPL-MCNC: 10.1 MG/DL (ref 8.6–10.3)
CHLORIDE SERPL-SCNC: 103 MMOL/L (ref 98–107)
CO2 SERPL-SCNC: 24 MMOL/L (ref 21–32)
CREAT SERPL-MCNC: 2.04 MG/DL (ref 0.5–1.3)
CRP SERPL-MCNC: 3.96 MG/DL
EGFRCR SERPLBLD CKD-EPI 2021: 37 ML/MIN/1.73M*2
ERYTHROCYTE [DISTWIDTH] IN BLOOD BY AUTOMATED COUNT: 14.3 % (ref 11.5–14.5)
GLUCOSE SERPL-MCNC: 160 MG/DL (ref 74–99)
HCT VFR BLD AUTO: 44 % (ref 41–52)
HGB BLD-MCNC: 14 G/DL (ref 13.5–17.5)
LABORATORY COMMENT REPORT: NORMAL
MCH RBC QN AUTO: 29.2 PG (ref 26–34)
MCHC RBC AUTO-ENTMCNC: 31.8 G/DL (ref 32–36)
MCV RBC AUTO: 92 FL (ref 80–100)
NRBC BLD-RTO: 0 /100 WBCS (ref 0–0)
PATH REPORT.FINAL DX SPEC: NORMAL
PATH REPORT.GROSS SPEC: NORMAL
PATH REPORT.TOTAL CANCER: NORMAL
PLATELET # BLD AUTO: 253 X10*3/UL (ref 150–450)
POTASSIUM SERPL-SCNC: 5.4 MMOL/L (ref 3.5–5.3)
PROT SERPL-MCNC: 8.1 G/DL (ref 6.4–8.2)
RBC # BLD AUTO: 4.8 X10*6/UL (ref 4.5–5.9)
SODIUM SERPL-SCNC: 138 MMOL/L (ref 136–145)
WBC # BLD AUTO: 17.2 X10*3/UL (ref 4.4–11.3)

## 2024-04-08 PROCEDURE — 80053 COMPREHEN METABOLIC PANEL: CPT

## 2024-04-08 PROCEDURE — 86140 C-REACTIVE PROTEIN: CPT

## 2024-04-08 PROCEDURE — 85027 COMPLETE CBC AUTOMATED: CPT

## 2024-04-08 PROCEDURE — G0299 HHS/HOSPICE OF RN EA 15 MIN: HCPCS

## 2024-04-08 ASSESSMENT — ENCOUNTER SYMPTOMS
DEPRESSION: 0
APPETITE LEVEL: GOOD
CHANGE IN APPETITE: UNCHANGED
OCCASIONAL FEELINGS OF UNSTEADINESS: 0
LOSS OF SENSATION IN FEET: 0
DENIES PAIN: 1
PERSON REPORTING PAIN: PATIENT

## 2024-04-08 ASSESSMENT — PAIN SCALES - PAIN ASSESSMENT IN ADVANCED DEMENTIA (PAINAD)
BODYLANGUAGE: 0
NEGVOCALIZATION: 0 - NONE.
CONSOLABILITY: 0 - NO NEED TO CONSOLE.
FACIALEXPRESSION: 0 - SMILING OR INEXPRESSIVE.
BODYLANGUAGE: 0 - RELAXED.
BREATHING: 0
NEGVOCALIZATION: 0
TOTALSCORE: 0
FACIALEXPRESSION: 0
CONSOLABILITY: 0

## 2024-04-09 ENCOUNTER — HOME INFUSION (OUTPATIENT)
Dept: INFUSION THERAPY | Age: 59
End: 2024-04-09
Payer: COMMERCIAL

## 2024-04-09 NOTE — PROGRESS NOTES
PATIENT CONTINUES IV ZOSYN THROUGH 5/14 FOR DIABETIC FOOT INFECTION.  REVEIWED LABS FROM 4/9 DRAW AND THEY ARE STABLE, CRP IS HIGH AT 3.96 AND WBC IS HIGH AT 17.2.  SPOKE WITH PATIENT AND HE IS DOING FINE WITH INFUSIONS.  NO SIDE EFFECTS OR INFUSION RELATED PROBLEMS TO REPORT.  HE SAID DELIVERY WED 6-8 IS FINE WITH STANDARD SUPPLIES.   TO CALL WHEN ON WAY.  PROCESSED FILL FOR 21 ZOSYN FOR MIX TODAY AND DEL. WED.  DOS 4/11 THRU 4/17.  NF FOR 4/16 FOR OVN DEL. DSL

## 2024-04-10 ENCOUNTER — HOME CARE VISIT (OUTPATIENT)
Dept: HOME HEALTH SERVICES | Facility: HOME HEALTH | Age: 59
End: 2024-04-10
Payer: COMMERCIAL

## 2024-04-10 VITALS
DIASTOLIC BLOOD PRESSURE: 66 MMHG | RESPIRATION RATE: 16 BRPM | HEART RATE: 93 BPM | TEMPERATURE: 98.3 F | SYSTOLIC BLOOD PRESSURE: 138 MMHG

## 2024-04-10 PROCEDURE — G0299 HHS/HOSPICE OF RN EA 15 MIN: HCPCS

## 2024-04-10 ASSESSMENT — ENCOUNTER SYMPTOMS
MUSCLE WEAKNESS: 1
CHANGE IN APPETITE: UNCHANGED

## 2024-04-11 ENCOUNTER — OFFICE VISIT (OUTPATIENT)
Dept: FAMILY MEDICINE CLINIC | Age: 59
End: 2024-04-11
Payer: COMMERCIAL

## 2024-04-11 VITALS
HEART RATE: 77 BPM | TEMPERATURE: 98 F | WEIGHT: 176 LBS | DIASTOLIC BLOOD PRESSURE: 84 MMHG | OXYGEN SATURATION: 96 % | HEIGHT: 71 IN | BODY MASS INDEX: 24.64 KG/M2 | SYSTOLIC BLOOD PRESSURE: 134 MMHG

## 2024-04-11 DIAGNOSIS — M86.9 OSTEOMYELITIS OF RIGHT FOOT, UNSPECIFIED TYPE (HCC): Primary | ICD-10-CM

## 2024-04-11 DIAGNOSIS — E11.65 TYPE 2 DIABETES MELLITUS WITH HYPERGLYCEMIA, UNSPECIFIED WHETHER LONG TERM INSULIN USE (HCC): ICD-10-CM

## 2024-04-11 DIAGNOSIS — S98.111A AMPUTATION OF RIGHT GREAT TOE (HCC): ICD-10-CM

## 2024-04-11 PROBLEM — E11.9 TYPE 2 DIABETES MELLITUS WITHOUT COMPLICATION, WITHOUT LONG-TERM CURRENT USE OF INSULIN (HCC): Status: RESOLVED | Noted: 2018-05-24 | Resolved: 2024-04-11

## 2024-04-11 PROCEDURE — 1036F TOBACCO NON-USER: CPT | Performed by: NURSE PRACTITIONER

## 2024-04-11 PROCEDURE — 3017F COLORECTAL CA SCREEN DOC REV: CPT | Performed by: NURSE PRACTITIONER

## 2024-04-11 PROCEDURE — 2022F DILAT RTA XM EVC RTNOPTHY: CPT | Performed by: NURSE PRACTITIONER

## 2024-04-11 PROCEDURE — G8427 DOCREV CUR MEDS BY ELIG CLIN: HCPCS | Performed by: NURSE PRACTITIONER

## 2024-04-11 PROCEDURE — 3079F DIAST BP 80-89 MM HG: CPT | Performed by: NURSE PRACTITIONER

## 2024-04-11 PROCEDURE — 3075F SYST BP GE 130 - 139MM HG: CPT | Performed by: NURSE PRACTITIONER

## 2024-04-11 PROCEDURE — G8420 CALC BMI NORM PARAMETERS: HCPCS | Performed by: NURSE PRACTITIONER

## 2024-04-11 PROCEDURE — 99214 OFFICE O/P EST MOD 30 MIN: CPT | Performed by: NURSE PRACTITIONER

## 2024-04-11 PROCEDURE — 3052F HG A1C>EQUAL 8.0%<EQUAL 9.0%: CPT | Performed by: NURSE PRACTITIONER

## 2024-04-11 RX ORDER — OXYCODONE HYDROCHLORIDE AND ACETAMINOPHEN 5; 325 MG/1; MG/1
1 TABLET ORAL EVERY 6 HOURS PRN
COMMUNITY
Start: 2024-04-03 | End: 2024-04-11 | Stop reason: ALTCHOICE

## 2024-04-11 RX ORDER — TRAMADOL HYDROCHLORIDE 50 MG/1
50 TABLET ORAL EVERY 8 HOURS PRN
Qty: 30 TABLET | Refills: 0 | Status: SHIPPED | OUTPATIENT
Start: 2024-04-11 | End: 2024-04-21

## 2024-04-11 ASSESSMENT — ENCOUNTER SYMPTOMS
ABDOMINAL PAIN: 0
TROUBLE SWALLOWING: 0
SINUS PAIN: 0
BLOOD IN STOOL: 0
PAIN LOCATION - PAIN SEVERITY: 5/10
EYE REDNESS: 0
PERSON REPORTING PAIN: PATIENT
VOMITING: 0
CONSTIPATION: 0
SORE THROAT: 0
SHORTNESS OF BREATH: 0
PHOTOPHOBIA: 0
SINUS PRESSURE: 0
DIARRHEA: 0
CHEST TIGHTNESS: 0
LOWEST PAIN SEVERITY IN PAST 24 HOURS: 3/10
PAIN LOCATION: RIGHT FOOT
HIGHEST PAIN SEVERITY IN PAST 24 HOURS: 8/10
NAUSEA: 0
PAIN SEVERITY GOAL: 4/10
PAIN: 1
RHINORRHEA: 0
EYE PAIN: 0
COUGH: 0

## 2024-04-11 NOTE — PROGRESS NOTES
metformin to 1000 mg twice daily, continue with Lantus 20 units and will be referring to endocrinology and ophthalmologist for assistance in management of condition..  Continue with scheduled appointment with nutritionist/diabetic education.      .On this date 4/11/2024 I have spent 30 minutes reviewing previous notes, test results and face to face with the patient discussing the diagnosis and importance of compliance with the treatment plan as well as documenting on the day of the visit.      No orders of the defined types were placed in this encounter.    Orders Placed This Encounter   Medications    metFORMIN (GLUCOPHAGE) 1000 MG tablet     Sig: Take 1 tablet by mouth 2 times daily (with meals)     Dispense:  180 tablet     Refill:  1    traMADol (ULTRAM) 50 MG tablet     Sig: Take 1 tablet by mouth every 8 hours as needed for Pain for up to 10 days. Intended supply: 5 days. Take lowest dose possible to manage pain Max Daily Amount: 150 mg     Dispense:  30 tablet     Refill:  0     Reduce doses taken as pain becomes manageable     Medications Discontinued During This Encounter   Medication Reason    oxyCODONE-acetaminophen (PERCOCET) 5-325 MG per tablet Therapy completed    metFORMIN (GLUCOPHAGE-XR) 500 MG extended release tablet Therapy completed     No follow-ups on file.      Reviewed with the patient: current clinical status, medications, activities and diet.     Side effects, adverse effects of the medication prescribed today, as well as treatment plan/ rationale and result expectations have been discussed with the patient who expresses understanding and desires to proceed.    Close follow up to evaluate treatment results and for coordination of care.  I have reviewed the patient's medical history in detail and updated the computerized patient record.    Geovanni Ford, APRN - CNP

## 2024-04-12 ENCOUNTER — TELEPHONE (OUTPATIENT)
Dept: FAMILY MEDICINE CLINIC | Age: 59
End: 2024-04-12

## 2024-04-12 ENCOUNTER — APPOINTMENT (OUTPATIENT)
Dept: WOUND CARE | Facility: CLINIC | Age: 59
End: 2024-04-12
Payer: COMMERCIAL

## 2024-04-12 RX ORDER — ONDANSETRON 4 MG/1
4 TABLET, ORALLY DISINTEGRATING ORAL 3 TIMES DAILY PRN
Qty: 21 TABLET | Refills: 0 | Status: SHIPPED | OUTPATIENT
Start: 2024-04-12

## 2024-04-12 NOTE — TELEPHONE ENCOUNTER
I contacted patient and discussed likely related to metformin, he can discontinue and start his lower dose of metformin that he was taking without any issues on Sunday.  Did send in for short course of ondansetron to be used also for nausea/vomiting.  If symptoms should become severely worsening please refer to ED

## 2024-04-12 NOTE — TELEPHONE ENCOUNTER
Patient started metformin and tramadol last night he has been vomiting ever since and some diarrhea. He is requesting a call back asap please advise.

## 2024-04-13 ENCOUNTER — APPOINTMENT (OUTPATIENT)
Dept: RADIOLOGY | Facility: HOSPITAL | Age: 59
End: 2024-04-13
Payer: COMMERCIAL

## 2024-04-13 ENCOUNTER — HOSPITAL ENCOUNTER (EMERGENCY)
Facility: HOSPITAL | Age: 59
Discharge: HOME | End: 2024-04-13
Payer: COMMERCIAL

## 2024-04-13 VITALS
HEART RATE: 62 BPM | TEMPERATURE: 98.4 F | SYSTOLIC BLOOD PRESSURE: 158 MMHG | BODY MASS INDEX: 23.52 KG/M2 | WEIGHT: 168 LBS | RESPIRATION RATE: 17 BRPM | OXYGEN SATURATION: 96 % | DIASTOLIC BLOOD PRESSURE: 75 MMHG | HEIGHT: 71 IN

## 2024-04-13 DIAGNOSIS — E87.6 HYPOKALEMIA: ICD-10-CM

## 2024-04-13 DIAGNOSIS — R11.2 NAUSEA VOMITING AND DIARRHEA: Primary | ICD-10-CM

## 2024-04-13 DIAGNOSIS — R19.7 NAUSEA VOMITING AND DIARRHEA: Primary | ICD-10-CM

## 2024-04-13 LAB
ALBUMIN SERPL BCP-MCNC: 3.9 G/DL (ref 3.4–5)
ALP SERPL-CCNC: 57 U/L (ref 33–120)
ALT SERPL W P-5'-P-CCNC: 17 U/L (ref 10–52)
ANION GAP SERPL CALC-SCNC: 14 MMOL/L (ref 10–20)
AST SERPL W P-5'-P-CCNC: 15 U/L (ref 9–39)
B-OH-BUTYR SERPL-SCNC: 0.86 MMOL/L (ref 0.02–0.27)
BASE EXCESS BLDV CALC-SCNC: 4.9 MMOL/L (ref -2–3)
BASOPHILS # BLD AUTO: 0.07 X10*3/UL (ref 0–0.1)
BASOPHILS NFR BLD AUTO: 0.8 %
BILIRUB SERPL-MCNC: 0.5 MG/DL (ref 0–1.2)
BODY TEMPERATURE: ABNORMAL
BUN SERPL-MCNC: 18 MG/DL (ref 6–23)
CALCIUM SERPL-MCNC: 9.5 MG/DL (ref 8.6–10.3)
CHLORIDE SERPL-SCNC: 98 MMOL/L (ref 98–107)
CO2 SERPL-SCNC: 25 MMOL/L (ref 21–32)
CREAT SERPL-MCNC: 1.05 MG/DL (ref 0.5–1.3)
EGFRCR SERPLBLD CKD-EPI 2021: 82 ML/MIN/1.73M*2
EOSINOPHIL # BLD AUTO: 0.25 X10*3/UL (ref 0–0.7)
EOSINOPHIL NFR BLD AUTO: 2.9 %
ERYTHROCYTE [DISTWIDTH] IN BLOOD BY AUTOMATED COUNT: 13.2 % (ref 11.5–14.5)
FLUAV RNA RESP QL NAA+PROBE: NOT DETECTED
FLUBV RNA RESP QL NAA+PROBE: NOT DETECTED
GLUCOSE SERPL-MCNC: 150 MG/DL (ref 74–99)
HCO3 BLDV-SCNC: 29.2 MMOL/L (ref 22–26)
HCT VFR BLD AUTO: 40.3 % (ref 41–52)
HGB BLD-MCNC: 13.8 G/DL (ref 13.5–17.5)
HOLD SPECIMEN: NORMAL
IMM GRANULOCYTES # BLD AUTO: 0.03 X10*3/UL (ref 0–0.7)
IMM GRANULOCYTES NFR BLD AUTO: 0.3 % (ref 0–0.9)
INHALED O2 CONCENTRATION: 98 %
LACTATE SERPL-SCNC: 0.8 MMOL/L (ref 0.4–2)
LIPASE SERPL-CCNC: 15 U/L (ref 9–82)
LYMPHOCYTES # BLD AUTO: 1.97 X10*3/UL (ref 1.2–4.8)
LYMPHOCYTES NFR BLD AUTO: 22.6 %
MAGNESIUM SERPL-MCNC: 1.65 MG/DL (ref 1.6–2.4)
MCH RBC QN AUTO: 29.4 PG (ref 26–34)
MCHC RBC AUTO-ENTMCNC: 34.2 G/DL (ref 32–36)
MCV RBC AUTO: 86 FL (ref 80–100)
MONOCYTES # BLD AUTO: 0.47 X10*3/UL (ref 0.1–1)
MONOCYTES NFR BLD AUTO: 5.4 %
NEUTROPHILS # BLD AUTO: 5.91 X10*3/UL (ref 1.2–7.7)
NEUTROPHILS NFR BLD AUTO: 68 %
NRBC BLD-RTO: 0 /100 WBCS (ref 0–0)
OXYHGB MFR BLDV: 69.5 % (ref 45–75)
PCO2 BLDV: 41 MM HG (ref 41–51)
PH BLDV: 7.46 PH (ref 7.33–7.43)
PLATELET # BLD AUTO: 230 X10*3/UL (ref 150–450)
PO2 BLDV: 41 MM HG (ref 35–45)
POTASSIUM SERPL-SCNC: 3.4 MMOL/L (ref 3.5–5.3)
PROT SERPL-MCNC: 8 G/DL (ref 6.4–8.2)
RBC # BLD AUTO: 4.69 X10*6/UL (ref 4.5–5.9)
SAO2 % BLDV: 71 % (ref 45–75)
SARS-COV-2 RNA RESP QL NAA+PROBE: NOT DETECTED
SODIUM SERPL-SCNC: 134 MMOL/L (ref 136–145)
WBC # BLD AUTO: 8.7 X10*3/UL (ref 4.4–11.3)

## 2024-04-13 PROCEDURE — 87636 SARSCOV2 & INF A&B AMP PRB: CPT | Performed by: PHYSICIAN ASSISTANT

## 2024-04-13 PROCEDURE — 82010 KETONE BODYS QUAN: CPT | Performed by: PHYSICIAN ASSISTANT

## 2024-04-13 PROCEDURE — 83690 ASSAY OF LIPASE: CPT | Performed by: PHYSICIAN ASSISTANT

## 2024-04-13 PROCEDURE — 83605 ASSAY OF LACTIC ACID: CPT | Performed by: PHYSICIAN ASSISTANT

## 2024-04-13 PROCEDURE — 85025 COMPLETE CBC W/AUTO DIFF WBC: CPT | Performed by: PHYSICIAN ASSISTANT

## 2024-04-13 PROCEDURE — 82805 BLOOD GASES W/O2 SATURATION: CPT | Performed by: PHYSICIAN ASSISTANT

## 2024-04-13 PROCEDURE — 96375 TX/PRO/DX INJ NEW DRUG ADDON: CPT

## 2024-04-13 PROCEDURE — 96361 HYDRATE IV INFUSION ADD-ON: CPT

## 2024-04-13 PROCEDURE — 96376 TX/PRO/DX INJ SAME DRUG ADON: CPT

## 2024-04-13 PROCEDURE — 74176 CT ABD & PELVIS W/O CONTRAST: CPT

## 2024-04-13 PROCEDURE — 83735 ASSAY OF MAGNESIUM: CPT | Performed by: PHYSICIAN ASSISTANT

## 2024-04-13 PROCEDURE — 99284 EMERGENCY DEPT VISIT MOD MDM: CPT | Mod: 25

## 2024-04-13 PROCEDURE — 80053 COMPREHEN METABOLIC PANEL: CPT | Performed by: PHYSICIAN ASSISTANT

## 2024-04-13 PROCEDURE — 2500000002 HC RX 250 W HCPCS SELF ADMINISTERED DRUGS (ALT 637 FOR MEDICARE OP, ALT 636 FOR OP/ED): Performed by: PHYSICIAN ASSISTANT

## 2024-04-13 PROCEDURE — 2500000004 HC RX 250 GENERAL PHARMACY W/ HCPCS (ALT 636 FOR OP/ED): Performed by: PHYSICIAN ASSISTANT

## 2024-04-13 PROCEDURE — 74176 CT ABD & PELVIS W/O CONTRAST: CPT | Performed by: STUDENT IN AN ORGANIZED HEALTH CARE EDUCATION/TRAINING PROGRAM

## 2024-04-13 PROCEDURE — 96365 THER/PROPH/DIAG IV INF INIT: CPT

## 2024-04-13 RX ORDER — PROMETHAZINE HYDROCHLORIDE 25 MG/1
25 SUPPOSITORY RECTAL EVERY 6 HOURS PRN
Qty: 12 SUPPOSITORY | Refills: 0 | Status: SHIPPED | OUTPATIENT
Start: 2024-04-13 | End: 2024-04-16

## 2024-04-13 RX ORDER — ONDANSETRON HYDROCHLORIDE 2 MG/ML
4 INJECTION, SOLUTION INTRAVENOUS ONCE
Status: COMPLETED | OUTPATIENT
Start: 2024-04-13 | End: 2024-04-13

## 2024-04-13 RX ORDER — POTASSIUM CHLORIDE 20 MEQ/1
40 TABLET, EXTENDED RELEASE ORAL ONCE
Status: COMPLETED | OUTPATIENT
Start: 2024-04-13 | End: 2024-04-13

## 2024-04-13 RX ORDER — HYDROMORPHONE HYDROCHLORIDE 1 MG/ML
1 INJECTION, SOLUTION INTRAMUSCULAR; INTRAVENOUS; SUBCUTANEOUS ONCE
Status: COMPLETED | OUTPATIENT
Start: 2024-04-13 | End: 2024-04-13

## 2024-04-13 RX ORDER — PANTOPRAZOLE SODIUM 20 MG/1
20 TABLET, DELAYED RELEASE ORAL DAILY
Qty: 20 TABLET | Refills: 0 | Status: SHIPPED | OUTPATIENT
Start: 2024-04-13 | End: 2024-05-03

## 2024-04-13 RX ADMIN — HYDROMORPHONE HYDROCHLORIDE 1 MG: 1 INJECTION, SOLUTION INTRAMUSCULAR; INTRAVENOUS; SUBCUTANEOUS at 09:28

## 2024-04-13 RX ADMIN — PIPERACILLIN SODIUM AND TAZOBACTAM SODIUM 3.38 G: 3; .375 INJECTION, SOLUTION INTRAVENOUS at 11:08

## 2024-04-13 RX ADMIN — HYDROMORPHONE HYDROCHLORIDE 1 MG: 1 INJECTION, SOLUTION INTRAMUSCULAR; INTRAVENOUS; SUBCUTANEOUS at 11:25

## 2024-04-13 RX ADMIN — POTASSIUM CHLORIDE 40 MEQ: 1500 TABLET, EXTENDED RELEASE ORAL at 11:41

## 2024-04-13 RX ADMIN — SODIUM CHLORIDE 1000 ML: 9 INJECTION, SOLUTION INTRAVENOUS at 09:28

## 2024-04-13 RX ADMIN — ONDANSETRON 4 MG: 2 INJECTION INTRAMUSCULAR; INTRAVENOUS at 09:28

## 2024-04-13 ASSESSMENT — COLUMBIA-SUICIDE SEVERITY RATING SCALE - C-SSRS
1. IN THE PAST MONTH, HAVE YOU WISHED YOU WERE DEAD OR WISHED YOU COULD GO TO SLEEP AND NOT WAKE UP?: NO
6. HAVE YOU EVER DONE ANYTHING, STARTED TO DO ANYTHING, OR PREPARED TO DO ANYTHING TO END YOUR LIFE?: NO
2. HAVE YOU ACTUALLY HAD ANY THOUGHTS OF KILLING YOURSELF?: NO

## 2024-04-13 ASSESSMENT — LIFESTYLE VARIABLES
HAVE PEOPLE ANNOYED YOU BY CRITICIZING YOUR DRINKING: NO
EVER HAD A DRINK FIRST THING IN THE MORNING TO STEADY YOUR NERVES TO GET RID OF A HANGOVER: NO
EVER FELT BAD OR GUILTY ABOUT YOUR DRINKING: NO
TOTAL SCORE: 0
HAVE YOU EVER FELT YOU SHOULD CUT DOWN ON YOUR DRINKING: NO

## 2024-04-13 ASSESSMENT — PAIN - FUNCTIONAL ASSESSMENT
PAIN_FUNCTIONAL_ASSESSMENT: 0-10

## 2024-04-13 ASSESSMENT — PAIN SCALES - GENERAL
PAINLEVEL_OUTOF10: 6
PAINLEVEL_OUTOF10: 6
PAINLEVEL_OUTOF10: 10 - WORST POSSIBLE PAIN

## 2024-04-13 NOTE — ED PROVIDER NOTES
"He is HPI   Chief Complaint   Patient presents with    Abdominal Pain     \"Here for stomach pian and vomiting.  I had my toe amputated last week and that is hurting too.  I called my docotr and he told me to get here.\"    Post-op Problem       A 58-year-old male patient comes in the emergency department today with complaints of nausea and vomiting over the last 3 days.  States this started after his metformin was increased.  States he is associate abdominal pain with this.  Rates pain 10 out of 10 on the pain scale.  States he has chills without any fevers.  States he does have history of diabetes.  Otherwise has no other complaints this present time for this purpose comes in the emergency department today for the evaluation.                          [place] Coma Scale Score: 15                     Patient History   Past Medical History:   Diagnosis Date    Diabetes mellitus (Multi)     Hypertension     Other specified health status     No pertinent past medical history     Past Surgical History:   Procedure Laterality Date    JOINT REPLACEMENT      OTHER SURGICAL HISTORY  11/02/2020    Knee replacement     Family History   Problem Relation Name Age of Onset    Diabetes type II Mother      Cancer Father       Social History     Tobacco Use    Smoking status: Every Day     Current packs/day: 1.50     Average packs/day: 1.5 packs/day for 45.0 years (67.5 ttl pk-yrs)     Types: Cigarettes    Smokeless tobacco: Never   Vaping Use    Vaping status: Never Used   Substance Use Topics    Alcohol use: Never    Drug use: Never       Physical Exam   ED Triage Vitals [04/13/24 0837]   Temperature Heart Rate Respirations BP   36.9 °C (98.4 °F) 76 18 (!) 174/95      Pulse Ox Temp Source Heart Rate Source Patient Position   97 % Temporal Monitor Sitting      BP Location FiO2 (%)     Right arm --       Physical Exam  Constitutional:       General: He is in acute distress (Moderate).      Appearance: Normal appearance. He is " ill-appearing. He is not diaphoretic.   HENT:      Head: Normocephalic and atraumatic.      Nose: Nose normal.   Eyes:      Extraocular Movements: Extraocular movements intact.      Conjunctiva/sclera: Conjunctivae normal.      Pupils: Pupils are equal, round, and reactive to light.   Cardiovascular:      Rate and Rhythm: Normal rate and regular rhythm.   Pulmonary:      Effort: Pulmonary effort is normal. No respiratory distress.      Breath sounds: Normal breath sounds. No stridor. No wheezing.   Abdominal:      General: Bowel sounds are decreased.      Tenderness: There is generalized abdominal tenderness. There is guarding. There is no rebound.   Musculoskeletal:         General: Normal range of motion.      Cervical back: Normal range of motion.   Skin:     General: Skin is warm and dry.   Neurological:      General: No focal deficit present.      Mental Status: He is alert and oriented to person, place, and time. Mental status is at baseline.   Psychiatric:         Mood and Affect: Mood normal.         ED Course & MDM   Diagnoses as of 04/13/24 1203   Nausea vomiting and diarrhea   Hypokalemia       Medical Decision Making  A 58-year-old male patient comes in the emergency department today with complaints of nausea and vomiting over the last 3 days.  States this started after his metformin was increased.  States he is associate abdominal pain with this.  Rates pain 10 out of 10 on the pain scale.  States he has chills without any fevers.  States he does have history of diabetes.  Otherwise has no other complaints this present time for this purpose comes in the emergency department today for the evaluation.    CT studies abdomen pelvis ordered as well as laboratory studies drawn to any acute intra-abdominal surgical need as well as electrolyte abnormalities, leukocytosis, acute kidney injury.  IV Dilaudid IV Zofran ordered for the patient.  IV Zosyn ordered for the patient is he has not been able to take his  PICC line Zosyn for recent toe amputation.    Patient negative for COVID-19 and influenza.  Patient potassium 3.4 p.o. potassium ordered for the patient.  Otherwise no other electrolyte abnormalities.  Lipase negative beta-hydroxybutyrate is at 0.86 magnesium 1.65 lactate 0.8 no elevated white blood cell count or left shift.  Bicarb and anion gap are normal.  Venous blood gas pH is not acidotic.  No DKA.  Patient still having pain IV Dilaudid ordered.  CT study of the abdomen pelvis shows constipation but no bowel obstruction.    Patient doing much better.  Patient will be discharged home with rectal suppository Phenergan as well as p.o. Protonix.  Patient agrees with this plan expressed full verbal understanding.  Questions answered.    Historians patient    Diagnosis: Nausea, vomiting, diarrhea.      Labs Reviewed   CBC WITH AUTO DIFFERENTIAL - Abnormal       Result Value    WBC 8.7      nRBC 0.0      RBC 4.69      Hemoglobin 13.8      Hematocrit 40.3 (*)     MCV 86      MCH 29.4      MCHC 34.2      RDW 13.2      Platelets 230      Neutrophils % 68.0      Immature Granulocytes %, Automated 0.3      Lymphocytes % 22.6      Monocytes % 5.4      Eosinophils % 2.9      Basophils % 0.8      Neutrophils Absolute 5.91      Immature Granulocytes Absolute, Automated 0.03      Lymphocytes Absolute 1.97      Monocytes Absolute 0.47      Eosinophils Absolute 0.25      Basophils Absolute 0.07     COMPREHENSIVE METABOLIC PANEL - Abnormal    Glucose 150 (*)     Sodium 134 (*)     Potassium 3.4 (*)     Chloride 98      Bicarbonate 25      Anion Gap 14      Urea Nitrogen 18      Creatinine 1.05      eGFR 82      Calcium 9.5      Albumin 3.9      Alkaline Phosphatase 57      Total Protein 8.0      AST 15      Bilirubin, Total 0.5      ALT 17     BETA HYDROXYBUTYRATE - Abnormal    Beta-Hydroxybutyrate 0.86 (*)     Narrative:     The beta-hydroxybutyrate test performance characteristics have been validated by  [facility]. This test has not been approved by the FDA; however,such approval is not necessary.     BLOOD GAS VENOUS - Abnormal    POCT pH, Venous 7.46 (*)     POCT pCO2, Venous 41      POCT pO2, Venous 41      POCT SO2, Venous 71      POCT Oxy Hemoglobin, Venous 69.5      POCT Base Excess, Venous 4.9 (*)     POCT HCO3 Calculated, Venous 29.2 (*)     Patient Temperature        FiO2 98     LIPASE - Normal    Lipase 15      Narrative:     Venipuncture immediately after or during the administration of Metamizole may lead to falsely low results. Testing should be performed immediately prior to Metamizole dosing.   LACTATE - Normal    Lactate 0.8      Narrative:     Venipuncture immediately after or during the administration of Metamizole may lead to falsely low results. Testing should be performed immediately  prior to Metamizole dosing.   MAGNESIUM - Normal    Magnesium 1.65     SARS-COV-2 PCR - Normal    Coronavirus 2019, PCR Not Detected      Narrative:     This assay has received FDA Emergency Use Authorization (EUA) and is only authorized for the duration of time that circumstances exist to justify the authorization of the emergency use of in vitro diagnostic tests for the detection of SARS-CoV-2 virus and/or diagnosis of COVID-19 infection under section 564(b)(1) of the Act, 21 U.S.C. 360bbb-3(b)(1). This assay is an in vitro diagnostic nucleic acid amplification test for the qualitative detection of SARS-CoV-2 from nasopharyngeal specimens and has been validated for use at [facility]. Negative results do not preclude COVID-19 infections and should not be used as the sole basis for diagnosis, treatment, or other management decisions.     INFLUENZA A AND B PCR - Normal    Flu A Result Not Detected      Flu B Result Not Detected      Narrative:     This assay is an in vitro diagnostic multiplex nucleic acid amplification test for the detection and discrimination of  Influenza A & B from nasopharyngeal specimens, and has been validated for use at [facility]. Negative results do not preclude Influenza A/B infections, and should not be used as the sole basis for diagnosis, treatment, or other management decisions. If Influenza A/B and RSV PCR results are negative, testing for Parainfluenza virus, Adenovirus and Metapneumovirus is routinely performed for [facility] pediatric oncology and intensive care inpatients, and is available on other patients by placing an add-on request.   URINALYSIS WITH REFLEX CULTURE AND MICROSCOPIC    Narrative:     The following orders were created for panel order Urinalysis with Reflex Culture and Microscopic.  Procedure                               Abnormality         Status                     ---------                               -----------         ------                     Urinalysis with Reflex C...[[id number]]                                                 Extra Urine Gray Tube[[id number]]                                                         Please view results for these tests on the individual orders.   URINALYSIS WITH REFLEX CULTURE AND MICROSCOPIC   EXTRA URINE GRAY TUBE        CT abdomen pelvis wo IV contrast   Final Result        Severe constipation. No bowel obstruction.        Right lower lobe ground-glass opacity, likely infection/inflammation.   Consider follow-up in 3-6 months.        MACRO:   None        Signed by: Essie Cain 4/13/2024 10:34 AM   Dictation workstation:   [id number]          Procedure  Procedures     Vladimir Abdalla PA-C  04/13/24 1202       Vladimir Abdalla PA-C  04/13/24 1202

## 2024-04-15 ENCOUNTER — HOME CARE VISIT (OUTPATIENT)
Dept: HOME HEALTH SERVICES | Facility: HOME HEALTH | Age: 59
End: 2024-04-15
Payer: COMMERCIAL

## 2024-04-15 ENCOUNTER — LAB (OUTPATIENT)
Dept: LAB | Facility: LAB | Age: 59
End: 2024-04-15
Payer: COMMERCIAL

## 2024-04-15 VITALS
RESPIRATION RATE: 16 BRPM | SYSTOLIC BLOOD PRESSURE: 122 MMHG | DIASTOLIC BLOOD PRESSURE: 70 MMHG | HEART RATE: 83 BPM | TEMPERATURE: 97.5 F

## 2024-04-15 DIAGNOSIS — L97.509 DIABETIC FOOT ULCER ASSOCIATED WITH DIABETES MELLITUS DUE TO UNDERLYING CONDITION, UNSPECIFIED LATERALITY, UNSPECIFIED PART OF FOOT, UNSPECIFIED ULCER STAGE (MULTI): ICD-10-CM

## 2024-04-15 DIAGNOSIS — E08.621 DIABETIC FOOT ULCER ASSOCIATED WITH DIABETES MELLITUS DUE TO UNDERLYING CONDITION, UNSPECIFIED LATERALITY, UNSPECIFIED PART OF FOOT, UNSPECIFIED ULCER STAGE (MULTI): ICD-10-CM

## 2024-04-15 LAB
ALBUMIN SERPL BCP-MCNC: 3.7 G/DL (ref 3.4–5)
ALP SERPL-CCNC: 69 U/L (ref 33–120)
ALT SERPL W P-5'-P-CCNC: 18 U/L (ref 10–52)
ANION GAP SERPL CALC-SCNC: 11 MMOL/L (ref 10–20)
AST SERPL W P-5'-P-CCNC: 15 U/L (ref 9–39)
BILIRUB SERPL-MCNC: 0.3 MG/DL (ref 0–1.2)
BUN SERPL-MCNC: 25 MG/DL (ref 6–23)
CALCIUM SERPL-MCNC: 8.6 MG/DL (ref 8.6–10.3)
CHLORIDE SERPL-SCNC: 104 MMOL/L (ref 98–107)
CO2 SERPL-SCNC: 26 MMOL/L (ref 21–32)
CREAT SERPL-MCNC: 1.29 MG/DL (ref 0.5–1.3)
CRP SERPL-MCNC: 0.73 MG/DL
EGFRCR SERPLBLD CKD-EPI 2021: 64 ML/MIN/1.73M*2
ERYTHROCYTE [DISTWIDTH] IN BLOOD BY AUTOMATED COUNT: 13.5 % (ref 11.5–14.5)
GLUCOSE SERPL-MCNC: 130 MG/DL (ref 74–99)
HCT VFR BLD AUTO: 37.8 % (ref 41–52)
HGB BLD-MCNC: 12.4 G/DL (ref 13.5–17.5)
MCH RBC QN AUTO: 29.2 PG (ref 26–34)
MCHC RBC AUTO-ENTMCNC: 32.8 G/DL (ref 32–36)
MCV RBC AUTO: 89 FL (ref 80–100)
NRBC BLD-RTO: 0 /100 WBCS (ref 0–0)
PLATELET # BLD AUTO: 214 X10*3/UL (ref 150–450)
POTASSIUM SERPL-SCNC: 4.2 MMOL/L (ref 3.5–5.3)
PROT SERPL-MCNC: 7 G/DL (ref 6.4–8.2)
RBC # BLD AUTO: 4.25 X10*6/UL (ref 4.5–5.9)
SODIUM SERPL-SCNC: 137 MMOL/L (ref 136–145)
WBC # BLD AUTO: 10.7 X10*3/UL (ref 4.4–11.3)

## 2024-04-15 PROCEDURE — G0299 HHS/HOSPICE OF RN EA 15 MIN: HCPCS

## 2024-04-15 PROCEDURE — 85027 COMPLETE CBC AUTOMATED: CPT

## 2024-04-15 PROCEDURE — 86140 C-REACTIVE PROTEIN: CPT

## 2024-04-15 PROCEDURE — 80053 COMPREHEN METABOLIC PANEL: CPT

## 2024-04-15 ASSESSMENT — ENCOUNTER SYMPTOMS
MUSCLE WEAKNESS: 1
HIGHEST PAIN SEVERITY IN PAST 24 HOURS: 7/10
PAIN: 1
APPETITE LEVEL: FAIR
LOWEST PAIN SEVERITY IN PAST 24 HOURS: 3/10
PAIN LOCATION - PAIN SEVERITY: 5/10
PAIN LOCATION: RIGHT FOOT
PAIN SEVERITY GOAL: 4/10
PERSON REPORTING PAIN: PATIENT

## 2024-04-16 ENCOUNTER — HOME INFUSION (OUTPATIENT)
Dept: INFUSION THERAPY | Age: 59
End: 2024-04-16
Payer: COMMERCIAL

## 2024-04-16 ENCOUNTER — TELEPHONE (OUTPATIENT)
Dept: FAMILY MEDICINE CLINIC | Age: 59
End: 2024-04-16

## 2024-04-16 DIAGNOSIS — M86.9 OSTEOMYELITIS OF RIGHT FOOT, UNSPECIFIED TYPE (HCC): Primary | ICD-10-CM

## 2024-04-16 DIAGNOSIS — S98.111A AMPUTATION OF RIGHT GREAT TOE (HCC): ICD-10-CM

## 2024-04-16 RX ORDER — HYDROCODONE BITARTRATE AND ACETAMINOPHEN 5; 325 MG/1; MG/1
1 TABLET ORAL EVERY 8 HOURS PRN
Qty: 15 TABLET | Refills: 0 | Status: SHIPPED | OUTPATIENT
Start: 2024-04-16 | End: 2024-04-21

## 2024-04-16 NOTE — PROGRESS NOTES
Review of chart. Patient with order for zosyn 3.375gm q8h thru 5/14/24. Weekly labs are ordered with results to dr Lawson.    Review of labs from 4/15/24. CRP wtl. RBC H and H slightly low.    Review of RN visit notes. No problems noted with line or infusions. Patient still waiting for ID to call with appt date.    Tel call with patient. He states infusions continue to go well and he confirmed need for delivery of further meds tomorrow with standard supplies.  to call on the way.    Processed fill for 21 x zosyn hp for mix 4/16 and 4/17 delivery to cover doses 4/8 thru 4/24/24.    Follow up 4/23/24 with next fill ovn. Check labs.

## 2024-04-16 NOTE — TELEPHONE ENCOUNTER
Pt calling stating that the tramadol is not helping the pt at all, pt states that he is not able to walk on his foot, only able to put weight on his heel. Please advise pt uses [facility]. Pt phone number is [phone].

## 2024-04-17 ENCOUNTER — HOME CARE VISIT (OUTPATIENT)
Dept: HOME HEALTH SERVICES | Facility: HOME HEALTH | Age: 59
End: 2024-04-17
Payer: COMMERCIAL

## 2024-04-17 ENCOUNTER — PATIENT OUTREACH (OUTPATIENT)
Dept: CARE COORDINATION | Facility: CLINIC | Age: 59
End: 2024-04-17
Payer: COMMERCIAL

## 2024-04-17 VITALS
HEART RATE: 76 BPM | RESPIRATION RATE: 18 BRPM | OXYGEN SATURATION: 97 % | SYSTOLIC BLOOD PRESSURE: 144 MMHG | TEMPERATURE: 97.7 F | DIASTOLIC BLOOD PRESSURE: 70 MMHG

## 2024-04-17 PROCEDURE — G0299 HHS/HOSPICE OF RN EA 15 MIN: HCPCS

## 2024-04-17 ASSESSMENT — ENCOUNTER SYMPTOMS: DENIES PAIN: 1

## 2024-04-17 NOTE — PROGRESS NOTES
Unable to reach patient for call back after patient's follow up appointment with PCP.   KYLE with call back number for patient to call if needed   If no voicemail available call attempts x 2 were made to contact the patient to assist with any questions or concerns patient may have.    No follow up appointment seen with dr Ruano post discharge

## 2024-04-22 ENCOUNTER — LAB (OUTPATIENT)
Dept: LAB | Facility: LAB | Age: 59
End: 2024-04-22
Payer: COMMERCIAL

## 2024-04-22 ENCOUNTER — HOME CARE VISIT (OUTPATIENT)
Dept: HOME HEALTH SERVICES | Facility: HOME HEALTH | Age: 59
End: 2024-04-22
Payer: COMMERCIAL

## 2024-04-22 ENCOUNTER — HOME INFUSION (OUTPATIENT)
Dept: INFUSION THERAPY | Age: 59
End: 2024-04-22

## 2024-04-22 VITALS
HEART RATE: 65 BPM | DIASTOLIC BLOOD PRESSURE: 96 MMHG | RESPIRATION RATE: 16 BRPM | SYSTOLIC BLOOD PRESSURE: 152 MMHG | TEMPERATURE: 98.5 F

## 2024-04-22 DIAGNOSIS — L97.509 DIABETIC FOOT ULCER ASSOCIATED WITH DIABETES MELLITUS DUE TO UNDERLYING CONDITION, UNSPECIFIED LATERALITY, UNSPECIFIED PART OF FOOT, UNSPECIFIED ULCER STAGE (MULTI): ICD-10-CM

## 2024-04-22 DIAGNOSIS — E08.621 DIABETIC FOOT ULCER ASSOCIATED WITH DIABETES MELLITUS DUE TO UNDERLYING CONDITION, UNSPECIFIED LATERALITY, UNSPECIFIED PART OF FOOT, UNSPECIFIED ULCER STAGE (MULTI): ICD-10-CM

## 2024-04-22 LAB
ALBUMIN SERPL BCP-MCNC: 3.7 G/DL (ref 3.4–5)
ALP SERPL-CCNC: 87 U/L (ref 33–120)
ALT SERPL W P-5'-P-CCNC: 10 U/L (ref 10–52)
ANION GAP SERPL CALC-SCNC: 13 MMOL/L (ref 10–20)
AST SERPL W P-5'-P-CCNC: 11 U/L (ref 9–39)
BILIRUB SERPL-MCNC: 0.3 MG/DL (ref 0–1.2)
BUN SERPL-MCNC: 25 MG/DL (ref 6–23)
CALCIUM SERPL-MCNC: 8.9 MG/DL (ref 8.6–10.3)
CHLORIDE SERPL-SCNC: 99 MMOL/L (ref 98–107)
CO2 SERPL-SCNC: 26 MMOL/L (ref 21–32)
CREAT SERPL-MCNC: 1.12 MG/DL (ref 0.5–1.3)
CRP SERPL-MCNC: 2.02 MG/DL
EGFRCR SERPLBLD CKD-EPI 2021: 76 ML/MIN/1.73M*2
ERYTHROCYTE [DISTWIDTH] IN BLOOD BY AUTOMATED COUNT: 13.9 % (ref 11.5–14.5)
GLUCOSE SERPL-MCNC: 177 MG/DL (ref 74–99)
HCT VFR BLD AUTO: 37.9 % (ref 41–52)
HGB BLD-MCNC: 12.1 G/DL (ref 13.5–17.5)
MCH RBC QN AUTO: 29.1 PG (ref 26–34)
MCHC RBC AUTO-ENTMCNC: 31.9 G/DL (ref 32–36)
MCV RBC AUTO: 91 FL (ref 80–100)
NRBC BLD-RTO: 0 /100 WBCS (ref 0–0)
PLATELET # BLD AUTO: 197 X10*3/UL (ref 150–450)
POTASSIUM SERPL-SCNC: 4.3 MMOL/L (ref 3.5–5.3)
PROT SERPL-MCNC: 7 G/DL (ref 6.4–8.2)
RBC # BLD AUTO: 4.16 X10*6/UL (ref 4.5–5.9)
SODIUM SERPL-SCNC: 134 MMOL/L (ref 136–145)
WBC # BLD AUTO: 16 X10*3/UL (ref 4.4–11.3)

## 2024-04-22 PROCEDURE — 85027 COMPLETE CBC AUTOMATED: CPT

## 2024-04-22 PROCEDURE — 86140 C-REACTIVE PROTEIN: CPT

## 2024-04-22 PROCEDURE — 80053 COMPREHEN METABOLIC PANEL: CPT

## 2024-04-22 PROCEDURE — G0299 HHS/HOSPICE OF RN EA 15 MIN: HCPCS

## 2024-04-22 ASSESSMENT — ENCOUNTER SYMPTOMS
PERSON REPORTING PAIN: PATIENT
PAIN: 1
LOWEST PAIN SEVERITY IN PAST 24 HOURS: 3/10
MUSCLE WEAKNESS: 1
PAIN SEVERITY GOAL: 4/10
LOWER EXTREMITY EDEMA: 1
HIGHEST PAIN SEVERITY IN PAST 24 HOURS: 6/10
PAIN LOCATION - PAIN SEVERITY: 4/10
PAIN LOCATION: RIGHT FOOT
CHANGE IN APPETITE: UNCHANGED

## 2024-04-22 NOTE — PROGRESS NOTES
Review of chart. Patient with order for zosyn 3.375gm q8h thru 5/14/24. Weekly labs are ordered with results to Dr Lawson.     No new labs to review. 4/22 results pending as of this writing.      Review of RN visit notes. No problems noted with line or infusions.     [facility] tried to call pt, no answer and phone not taking calls at this time. Unable to leave . Pharmacy will proceed with Wed 4/24 delivery.     Processed fill for 21x Zosyn HP for mix 4/23 and 4/24 delivery to cover doses 4/25 thru 5/1/24.     Follow up 4/30/24 with next fill OVN. Check labs.

## 2024-04-24 ENCOUNTER — HOME CARE VISIT (OUTPATIENT)
Dept: HOME HEALTH SERVICES | Facility: HOME HEALTH | Age: 59
End: 2024-04-24
Payer: COMMERCIAL

## 2024-04-24 PROCEDURE — G0299 HHS/HOSPICE OF RN EA 15 MIN: HCPCS

## 2024-04-25 ENCOUNTER — OFFICE VISIT (OUTPATIENT)
Dept: FAMILY MEDICINE CLINIC | Age: 59
End: 2024-04-25
Payer: COMMERCIAL

## 2024-04-25 ENCOUNTER — APPOINTMENT (OUTPATIENT)
Dept: NUTRITION | Facility: HOSPITAL | Age: 59
End: 2024-04-25
Payer: COMMERCIAL

## 2024-04-25 VITALS
HEIGHT: 71 IN | DIASTOLIC BLOOD PRESSURE: 100 MMHG | BODY MASS INDEX: 23.49 KG/M2 | WEIGHT: 167.8 LBS | OXYGEN SATURATION: 98 % | SYSTOLIC BLOOD PRESSURE: 192 MMHG | HEART RATE: 76 BPM

## 2024-04-25 VITALS
DIASTOLIC BLOOD PRESSURE: 72 MMHG | TEMPERATURE: 97.7 F | RESPIRATION RATE: 16 BRPM | SYSTOLIC BLOOD PRESSURE: 160 MMHG | HEART RATE: 80 BPM

## 2024-04-25 DIAGNOSIS — M47.817 LUMBOSACRAL SPONDYLOSIS WITHOUT MYELOPATHY: ICD-10-CM

## 2024-04-25 DIAGNOSIS — S98.111A AMPUTATION OF RIGHT GREAT TOE (HCC): Primary | ICD-10-CM

## 2024-04-25 DIAGNOSIS — M86.9 OSTEOMYELITIS OF RIGHT FOOT, UNSPECIFIED TYPE (HCC): ICD-10-CM

## 2024-04-25 PROCEDURE — G8427 DOCREV CUR MEDS BY ELIG CLIN: HCPCS | Performed by: NURSE PRACTITIONER

## 2024-04-25 PROCEDURE — 3017F COLORECTAL CA SCREEN DOC REV: CPT | Performed by: NURSE PRACTITIONER

## 2024-04-25 PROCEDURE — 99213 OFFICE O/P EST LOW 20 MIN: CPT | Performed by: NURSE PRACTITIONER

## 2024-04-25 PROCEDURE — 3077F SYST BP >= 140 MM HG: CPT | Performed by: NURSE PRACTITIONER

## 2024-04-25 PROCEDURE — 3080F DIAST BP >= 90 MM HG: CPT | Performed by: NURSE PRACTITIONER

## 2024-04-25 PROCEDURE — 4004F PT TOBACCO SCREEN RCVD TLK: CPT | Performed by: NURSE PRACTITIONER

## 2024-04-25 PROCEDURE — G8420 CALC BMI NORM PARAMETERS: HCPCS | Performed by: NURSE PRACTITIONER

## 2024-04-25 RX ORDER — PROMETHAZINE HYDROCHLORIDE 25 MG/1
SUPPOSITORY RECTAL
COMMUNITY
Start: 2024-04-13

## 2024-04-25 RX ORDER — HYDROCODONE BITARTRATE AND ACETAMINOPHEN 5; 325 MG/1; MG/1
1 TABLET ORAL EVERY 8 HOURS PRN
Qty: 15 TABLET | Refills: 0 | Status: SHIPPED | OUTPATIENT
Start: 2024-04-25 | End: 2024-04-30

## 2024-04-25 RX ORDER — PIPERACILLIN SODIUM, TAZOBACTAM SODIUM 3; .375 G/15ML; G/15ML
INJECTION, POWDER, LYOPHILIZED, FOR SOLUTION INTRAVENOUS
COMMUNITY
Start: 2024-04-23

## 2024-04-25 RX ORDER — OXYCODONE HYDROCHLORIDE 5 MG/1
TABLET ORAL
COMMUNITY
Start: 2023-12-06

## 2024-04-25 RX ORDER — INSULIN LISPRO 100 [IU]/ML
INJECTION, SOLUTION INTRAVENOUS; SUBCUTANEOUS
COMMUNITY
Start: 2024-03-25

## 2024-04-25 RX ORDER — GABAPENTIN 300 MG/1
600 CAPSULE ORAL 3 TIMES DAILY
Qty: 120 CAPSULE | Refills: 0 | Status: SHIPPED | OUTPATIENT
Start: 2024-04-25 | End: 2024-05-15

## 2024-04-25 RX ORDER — PANTOPRAZOLE SODIUM 20 MG/1
20 TABLET, DELAYED RELEASE ORAL
COMMUNITY
Start: 2024-04-13

## 2024-04-25 RX ORDER — DEXTROSE MONOHYDRATE 25 G/50ML
12.5 INJECTION, SOLUTION INTRAVENOUS
COMMUNITY
Start: 2024-03-25

## 2024-04-25 RX ORDER — HYDROCHLOROTHIAZIDE 25 MG/1
25 TABLET ORAL
COMMUNITY
Start: 2024-04-02

## 2024-04-25 RX ORDER — ACETAMINOPHEN 325 MG/1
650 TABLET ORAL
COMMUNITY
Start: 2024-03-25

## 2024-04-25 ASSESSMENT — ENCOUNTER SYMPTOMS
PERSON REPORTING PAIN: PATIENT
PAIN: 1
PAIN SEVERITY GOAL: 4/10
PAIN LOCATION: RIGHT FOOT
LOWEST PAIN SEVERITY IN PAST 24 HOURS: 3/10
HIGHEST PAIN SEVERITY IN PAST 24 HOURS: 7/10
LOWER EXTREMITY EDEMA: 1
APPETITE LEVEL: GOOD
CHANGE IN APPETITE: UNCHANGED
MUSCLE WEAKNESS: 1
PAIN LOCATION - PAIN SEVERITY: 5/10

## 2024-04-25 NOTE — PROGRESS NOTES
Subjective  Tee Jj, 58 y.o. male presents today with:  Chief Complaint   Patient presents with    1 Month Follow-Up     Patient states continued pain to the area. He has been taking ibuprofen that does not seem to work. Pt will f/u with wound care clinic on Friday.        I reviewed staff HPI/chief complaint and do agree with above    Patient in hospital from 3/25 through 4/6 for a diabetic foot ulcer of the right great toe did have a partial right first digit amputation.    Patient states continues with pain to the area has been using over-the-counter medication ibuprofen with no effectiveness.  Was prescribed a short term pain medication during last visit which was effective for the pain has been on the medication for a few days and pain has significantly increased.  He continues following with home health/wound care and intravenous antibiotics.  The area is wrapped/dressed today in office, patient states that has told him that the area is healing well with no significant delays in healing or signs of secondary infection.  He does have a significantly elevated blood pressure today in office, he does take hydrochlorothiazide at home for hypertension, he denies any headache/vision changes, dizziness, shortness of breath/troubles breathing, chest pain, palpitations today in office.    Patient's last hemoglobin A1c 8.9% on 3/25, states sugars continue to run stable and usually below the 200s, continues on metformin 1000 mg twice daily and 20 units of Lantus at night.  During last visit he was referred to endocrinology has not had appointment to date.          Review of Systems   Constitutional:  Negative for activity change, appetite change, chills, fatigue and fever.   Respiratory:  Negative for cough, chest tightness and shortness of breath.    Cardiovascular:  Negative for chest pain, palpitations and leg swelling.   Gastrointestinal:  Negative for abdominal pain, blood in stool, constipation,

## 2024-04-29 ENCOUNTER — HOME INFUSION (OUTPATIENT)
Dept: INFUSION THERAPY | Age: 59
End: 2024-04-29
Payer: COMMERCIAL

## 2024-04-29 ENCOUNTER — LAB REQUISITION (OUTPATIENT)
Dept: LAB | Facility: LAB | Age: 59
End: 2024-04-29
Payer: COMMERCIAL

## 2024-04-29 ENCOUNTER — HOME CARE VISIT (OUTPATIENT)
Dept: HOME HEALTH SERVICES | Facility: HOME HEALTH | Age: 59
End: 2024-04-29
Payer: COMMERCIAL

## 2024-04-29 VITALS
RESPIRATION RATE: 20 BRPM | HEART RATE: 64 BPM | TEMPERATURE: 98.3 F | DIASTOLIC BLOOD PRESSURE: 80 MMHG | SYSTOLIC BLOOD PRESSURE: 150 MMHG | OXYGEN SATURATION: 98 %

## 2024-04-29 DIAGNOSIS — M86.9 OSTEOMYELITIS, UNSPECIFIED (MULTI): ICD-10-CM

## 2024-04-29 DIAGNOSIS — E11.621 TYPE 2 DIABETES MELLITUS WITH FOOT ULCER (CODE) (MULTI): ICD-10-CM

## 2024-04-29 LAB
ALBUMIN SERPL BCP-MCNC: 3.8 G/DL (ref 3.4–5)
ALP SERPL-CCNC: 93 U/L (ref 33–120)
ALT SERPL W P-5'-P-CCNC: 10 U/L (ref 10–52)
ANION GAP SERPL CALC-SCNC: 16 MMOL/L (ref 10–20)
AST SERPL W P-5'-P-CCNC: 12 U/L (ref 9–39)
BILIRUB SERPL-MCNC: 0.3 MG/DL (ref 0–1.2)
BUN SERPL-MCNC: 19 MG/DL (ref 6–23)
CALCIUM SERPL-MCNC: 8.6 MG/DL (ref 8.6–10.3)
CHLORIDE SERPL-SCNC: 101 MMOL/L (ref 98–107)
CO2 SERPL-SCNC: 25 MMOL/L (ref 21–32)
CREAT SERPL-MCNC: 0.9 MG/DL (ref 0.5–1.3)
CRP SERPL-MCNC: 0.81 MG/DL
EGFRCR SERPLBLD CKD-EPI 2021: >90 ML/MIN/1.73M*2
ERYTHROCYTE [DISTWIDTH] IN BLOOD BY AUTOMATED COUNT: 14.3 % (ref 11.5–14.5)
GLUCOSE SERPL-MCNC: 158 MG/DL (ref 74–99)
HCT VFR BLD AUTO: 39 % (ref 41–52)
HGB BLD-MCNC: 12.7 G/DL (ref 13.5–17.5)
MCH RBC QN AUTO: 29.1 PG (ref 26–34)
MCHC RBC AUTO-ENTMCNC: 32.6 G/DL (ref 32–36)
MCV RBC AUTO: 89 FL (ref 80–100)
NRBC BLD-RTO: 0 /100 WBCS (ref 0–0)
PLATELET # BLD AUTO: 202 X10*3/UL (ref 150–450)
POTASSIUM SERPL-SCNC: 3.5 MMOL/L (ref 3.5–5.3)
PROT SERPL-MCNC: 7.2 G/DL (ref 6.4–8.2)
RBC # BLD AUTO: 4.36 X10*6/UL (ref 4.5–5.9)
SODIUM SERPL-SCNC: 138 MMOL/L (ref 136–145)
WBC # BLD AUTO: 18.7 X10*3/UL (ref 4.4–11.3)

## 2024-04-29 PROCEDURE — G0299 HHS/HOSPICE OF RN EA 15 MIN: HCPCS

## 2024-04-29 PROCEDURE — 85027 COMPLETE CBC AUTOMATED: CPT

## 2024-04-29 PROCEDURE — 86140 C-REACTIVE PROTEIN: CPT

## 2024-04-29 PROCEDURE — 80053 COMPREHEN METABOLIC PANEL: CPT

## 2024-04-29 ASSESSMENT — ENCOUNTER SYMPTOMS
PAIN SEVERITY GOAL: 0/10
LOWEST PAIN SEVERITY IN PAST 24 HOURS: 4/10
HIGHEST PAIN SEVERITY IN PAST 24 HOURS: 6/10
LOWER EXTREMITY EDEMA: 1
APPETITE LEVEL: GOOD
LIMITED RANGE OF MOTION: 1
SUBJECTIVE PAIN PROGRESSION: WAXING AND WANING
LAST BOWEL MOVEMENT: 66959

## 2024-04-29 ASSESSMENT — ACTIVITIES OF DAILY LIVING (ADL)
CURRENT_FUNCTION: STAND BY ASSIST
AMBULATION ASSISTANCE: STAND BY ASSIST

## 2024-04-29 NOTE — PROGRESS NOTES
Review of chart. Patient with order for zosyn 3.375gm q8h thru 5/14/24. Weekly labs are ordered with results to Dr Lawson.     Review of labs from 4/22. Crp  and Wbc is elevated.      Review of RN visit notes. No problems noted with line or infusions. Patient reported to have BLE edema. He reported that he had been doing a lot of housework the previous few days. RN advised need to keep BLE elevated and balance rest and activity periods. Patient still waiting for call from ID regarding follow up appointment.     Processed fill for 21x Zosyn HP for mix 4/30 and 5/1 delivery to cover doses 5/2 thru 5/8/24.     Follow up 5/7/24 with refill of remainder OVN. Check labs.

## 2024-05-01 ENCOUNTER — HOME CARE VISIT (OUTPATIENT)
Dept: HOME HEALTH SERVICES | Facility: HOME HEALTH | Age: 59
End: 2024-05-01
Payer: COMMERCIAL

## 2024-05-01 VITALS
DIASTOLIC BLOOD PRESSURE: 88 MMHG | TEMPERATURE: 98 F | RESPIRATION RATE: 16 BRPM | HEART RATE: 68 BPM | SYSTOLIC BLOOD PRESSURE: 142 MMHG

## 2024-05-01 PROCEDURE — G0299 HHS/HOSPICE OF RN EA 15 MIN: HCPCS

## 2024-05-01 ASSESSMENT — ENCOUNTER SYMPTOMS
PAIN: 1
LOWEST PAIN SEVERITY IN PAST 24 HOURS: 4/10
VOMITING: 0
PAIN SEVERITY GOAL: 4/10
CHEST TIGHTNESS: 0
DIARRHEA: 0
CHANGE IN APPETITE: UNCHANGED
CONSTIPATION: 0
PAIN LOCATION: RIGHT FOOT
HIGHEST PAIN SEVERITY IN PAST 24 HOURS: 7/10
COLOR CHANGE: 0
MUSCLE WEAKNESS: 1
PAIN LOCATION - PAIN SEVERITY: 5/10
LOWER EXTREMITY EDEMA: 1
BACK PAIN: 0
NAUSEA: 0
SHORTNESS OF BREATH: 0
COUGH: 0
ABDOMINAL PAIN: 0
BLOOD IN STOOL: 0

## 2024-05-03 ENCOUNTER — OFFICE VISIT (OUTPATIENT)
Dept: WOUND CARE | Facility: CLINIC | Age: 59
End: 2024-05-03
Payer: COMMERCIAL

## 2024-05-03 PROCEDURE — 11042 DBRDMT SUBQ TIS 1ST 20SQCM/<: CPT

## 2024-05-06 ENCOUNTER — LAB (OUTPATIENT)
Dept: LAB | Facility: LAB | Age: 59
End: 2024-05-06
Payer: COMMERCIAL

## 2024-05-06 ENCOUNTER — HOME CARE VISIT (OUTPATIENT)
Dept: HOME HEALTH SERVICES | Facility: HOME HEALTH | Age: 59
End: 2024-05-06
Payer: COMMERCIAL

## 2024-05-06 ENCOUNTER — LAB REQUISITION (OUTPATIENT)
Dept: LAB | Facility: LAB | Age: 59
End: 2024-05-06
Payer: COMMERCIAL

## 2024-05-06 VITALS
DIASTOLIC BLOOD PRESSURE: 76 MMHG | TEMPERATURE: 97.8 F | RESPIRATION RATE: 16 BRPM | SYSTOLIC BLOOD PRESSURE: 134 MMHG | HEART RATE: 77 BPM | OXYGEN SATURATION: 98 %

## 2024-05-06 DIAGNOSIS — E08.621 DIABETIC FOOT ULCER ASSOCIATED WITH DIABETES MELLITUS DUE TO UNDERLYING CONDITION, UNSPECIFIED LATERALITY, UNSPECIFIED PART OF FOOT, UNSPECIFIED ULCER STAGE (MULTI): ICD-10-CM

## 2024-05-06 DIAGNOSIS — L97.509 DIABETIC FOOT ULCER ASSOCIATED WITH DIABETES MELLITUS DUE TO UNDERLYING CONDITION, UNSPECIFIED LATERALITY, UNSPECIFIED PART OF FOOT, UNSPECIFIED ULCER STAGE (MULTI): ICD-10-CM

## 2024-05-06 LAB
ALBUMIN SERPL BCP-MCNC: 3.8 G/DL (ref 3.4–5)
ALP SERPL-CCNC: 88 U/L (ref 33–120)
ALT SERPL W P-5'-P-CCNC: 9 U/L (ref 10–52)
ANION GAP SERPL CALC-SCNC: 10 MMOL/L (ref 10–20)
AST SERPL W P-5'-P-CCNC: 12 U/L (ref 9–39)
BILIRUB SERPL-MCNC: 0.4 MG/DL (ref 0–1.2)
BUN SERPL-MCNC: 22 MG/DL (ref 6–23)
CALCIUM SERPL-MCNC: 9.3 MG/DL (ref 8.6–10.3)
CHLORIDE SERPL-SCNC: 102 MMOL/L (ref 98–107)
CO2 SERPL-SCNC: 28 MMOL/L (ref 21–32)
CREAT SERPL-MCNC: 0.98 MG/DL (ref 0.5–1.3)
CRP SERPL-MCNC: 1.01 MG/DL
EGFRCR SERPLBLD CKD-EPI 2021: 89 ML/MIN/1.73M*2
ERYTHROCYTE [DISTWIDTH] IN BLOOD BY AUTOMATED COUNT: 14 % (ref 11.5–14.5)
GLUCOSE SERPL-MCNC: 163 MG/DL (ref 74–99)
HCT VFR BLD AUTO: 40.7 % (ref 41–52)
HGB BLD-MCNC: 13.2 G/DL (ref 13.5–17.5)
MCH RBC QN AUTO: 28.8 PG (ref 26–34)
MCHC RBC AUTO-ENTMCNC: 32.4 G/DL (ref 32–36)
MCV RBC AUTO: 89 FL (ref 80–100)
NRBC BLD-RTO: 0 /100 WBCS (ref 0–0)
PLATELET # BLD AUTO: 233 X10*3/UL (ref 150–450)
POTASSIUM SERPL-SCNC: 4.4 MMOL/L (ref 3.5–5.3)
PROT SERPL-MCNC: 7.2 G/DL (ref 6.4–8.2)
RBC # BLD AUTO: 4.59 X10*6/UL (ref 4.5–5.9)
SODIUM SERPL-SCNC: 136 MMOL/L (ref 136–145)
WBC # BLD AUTO: 9.3 X10*3/UL (ref 4.4–11.3)

## 2024-05-06 PROCEDURE — 80053 COMPREHEN METABOLIC PANEL: CPT

## 2024-05-06 PROCEDURE — G0299 HHS/HOSPICE OF RN EA 15 MIN: HCPCS

## 2024-05-06 PROCEDURE — 0023 HH SOC

## 2024-05-06 PROCEDURE — 85027 COMPLETE CBC AUTOMATED: CPT

## 2024-05-06 PROCEDURE — 86140 C-REACTIVE PROTEIN: CPT

## 2024-05-07 ENCOUNTER — HOME INFUSION (OUTPATIENT)
Dept: INFUSION THERAPY | Age: 59
End: 2024-05-07
Payer: COMMERCIAL

## 2024-05-07 ASSESSMENT — ENCOUNTER SYMPTOMS
MUSCLE WEAKNESS: 1
PERSON REPORTING PAIN: PATIENT
PAIN LOCATION - PAIN SEVERITY: 5/10
HIGHEST PAIN SEVERITY IN PAST 24 HOURS: 6/10
PAIN: 1
CHANGE IN APPETITE: UNCHANGED
PAIN LOCATION: RIGHT FOOT
LOWEST PAIN SEVERITY IN PAST 24 HOURS: 3/10
LOWER EXTREMITY EDEMA: 1
PAIN SEVERITY GOAL: 4/10

## 2024-05-07 NOTE — PROGRESS NOTES
Review of chart. Patient with order for zosyn 3.375gm q8h thru 5/14/24. Weekly labs are ordered with results to Dr Lawson.     5/6 labs reviewed. CRP 1.01.     Review of 5/6 RN visit notes. No FU appt scheduled yet with ID, advised to call office.     [facility] tried to call pt, no answer and phone not taking calls at this time. Unable to leave . Pharmacy will proceed with Wed 5/8 delivery.     Processed fill for 21x Zosyn HP for mix 5/7 and 5/8 delivery to cover doses 5/9 thru 5/14/24.     Follow up 5/24/24 POC Dr Lawson

## 2024-05-08 ENCOUNTER — HOME CARE VISIT (OUTPATIENT)
Dept: HOME HEALTH SERVICES | Facility: HOME HEALTH | Age: 59
End: 2024-05-08
Payer: COMMERCIAL

## 2024-05-08 VITALS
DIASTOLIC BLOOD PRESSURE: 72 MMHG | TEMPERATURE: 98 F | RESPIRATION RATE: 16 BRPM | SYSTOLIC BLOOD PRESSURE: 142 MMHG | HEART RATE: 80 BPM

## 2024-05-08 PROCEDURE — G0299 HHS/HOSPICE OF RN EA 15 MIN: HCPCS

## 2024-05-08 ASSESSMENT — ENCOUNTER SYMPTOMS
DENIES PAIN: 1
CHANGE IN APPETITE: UNCHANGED
MUSCLE WEAKNESS: 1
LOWER EXTREMITY EDEMA: 1

## 2024-05-09 ENCOUNTER — OFFICE VISIT (OUTPATIENT)
Dept: FAMILY MEDICINE CLINIC | Age: 59
End: 2024-05-09
Payer: COMMERCIAL

## 2024-05-09 VITALS
BODY MASS INDEX: 23.49 KG/M2 | HEART RATE: 55 BPM | SYSTOLIC BLOOD PRESSURE: 154 MMHG | OXYGEN SATURATION: 98 % | DIASTOLIC BLOOD PRESSURE: 92 MMHG | HEIGHT: 71 IN | WEIGHT: 167.8 LBS

## 2024-05-09 DIAGNOSIS — Z79.4 TYPE 2 DIABETES MELLITUS WITH OTHER SPECIFIED COMPLICATION, WITH LONG-TERM CURRENT USE OF INSULIN (HCC): Primary | ICD-10-CM

## 2024-05-09 DIAGNOSIS — E11.69 TYPE 2 DIABETES MELLITUS WITH OTHER SPECIFIED COMPLICATION, WITH LONG-TERM CURRENT USE OF INSULIN (HCC): Primary | ICD-10-CM

## 2024-05-09 DIAGNOSIS — M86.9 OSTEOMYELITIS OF RIGHT FOOT, UNSPECIFIED TYPE (HCC): ICD-10-CM

## 2024-05-09 PROCEDURE — 99213 OFFICE O/P EST LOW 20 MIN: CPT | Performed by: NURSE PRACTITIONER

## 2024-05-09 PROCEDURE — 3052F HG A1C>EQUAL 8.0%<EQUAL 9.0%: CPT | Performed by: NURSE PRACTITIONER

## 2024-05-09 PROCEDURE — 2022F DILAT RTA XM EVC RTNOPTHY: CPT | Performed by: NURSE PRACTITIONER

## 2024-05-09 PROCEDURE — 3017F COLORECTAL CA SCREEN DOC REV: CPT | Performed by: NURSE PRACTITIONER

## 2024-05-09 PROCEDURE — 4004F PT TOBACCO SCREEN RCVD TLK: CPT | Performed by: NURSE PRACTITIONER

## 2024-05-09 PROCEDURE — G8420 CALC BMI NORM PARAMETERS: HCPCS | Performed by: NURSE PRACTITIONER

## 2024-05-09 PROCEDURE — 3080F DIAST BP >= 90 MM HG: CPT | Performed by: NURSE PRACTITIONER

## 2024-05-09 PROCEDURE — 3077F SYST BP >= 140 MM HG: CPT | Performed by: NURSE PRACTITIONER

## 2024-05-09 PROCEDURE — G8427 DOCREV CUR MEDS BY ELIG CLIN: HCPCS | Performed by: NURSE PRACTITIONER

## 2024-05-09 RX ORDER — HYDROCODONE BITARTRATE AND ACETAMINOPHEN 5; 325 MG/1; MG/1
1 TABLET ORAL 2 TIMES DAILY PRN
Qty: 14 TABLET | Refills: 0 | Status: SHIPPED | OUTPATIENT
Start: 2024-05-09 | End: 2024-05-16

## 2024-05-09 NOTE — PROGRESS NOTES
Subjective  Tee Jj, 58 y.o. male presents today with:  Chief Complaint   Patient presents with    2 Week Follow-Up     Patient continued on the gabapentin 600 mg. Norco completed. Continued pain to the area. Rates pain 7/10        I reviewed staff HPI/chief complaint and do agree with above    Continues following with home health and IV antibiotics  To the PICC line in the right arm.  He has followed with surgeon/wound care and did have the sutures removed from the amputation site of right great toe.  He states was told most of it was scabbed over there are a few that are still open still continues to have pain to amputation site.  Has been taking gabapentin and has had 2 short-term courses of Norco to help control breakthrough pain.  Denies any fevers, swelling at the site, bleeding through the dressing, swelling of the lower extremities, shortness of breath/troubles breathing, any nausea/vomiting/diarrhea.    Patient states sugars have been stable they still do run in the 150s/160s regularly, will get some readings intermittently around 190s to 200s.  Currently taking metformin 500 mg twice daily, was unable to tolerate 1000 mg twice daily due to GI symptoms, taking 20 units of Lantus daily.  He was referred to an endocrinologist and ophthalmologist for diabetes management during last visit however has not scheduled these appointments.          Review of Systems   Constitutional:  Negative for chills, diaphoresis, fatigue, fever and unexpected weight change.   Respiratory:  Negative for chest tightness and shortness of breath.    Cardiovascular:  Negative for chest pain and palpitations.   Gastrointestinal:  Negative for abdominal pain, constipation, diarrhea, nausea and vomiting.   Endocrine: Negative for polydipsia, polyphagia and polyuria.   Genitourinary:  Negative for difficulty urinating, dysuria, hematuria and urgency.   Musculoskeletal:  Negative for arthralgias, back pain, joint swelling,

## 2024-05-10 ENCOUNTER — OFFICE VISIT (OUTPATIENT)
Dept: WOUND CARE | Facility: CLINIC | Age: 59
End: 2024-05-10
Payer: COMMERCIAL

## 2024-05-10 PROCEDURE — 99213 OFFICE O/P EST LOW 20 MIN: CPT

## 2024-05-13 ENCOUNTER — OFFICE VISIT (OUTPATIENT)
Dept: INFECTIOUS DISEASES | Age: 59
End: 2024-05-13
Payer: COMMERCIAL

## 2024-05-13 ENCOUNTER — LAB REQUISITION (OUTPATIENT)
Dept: LAB | Facility: LAB | Age: 59
End: 2024-05-13
Payer: COMMERCIAL

## 2024-05-13 ENCOUNTER — HOME INFUSION (OUTPATIENT)
Dept: INFUSION THERAPY | Age: 59
End: 2024-05-13

## 2024-05-13 ENCOUNTER — HOME CARE VISIT (OUTPATIENT)
Dept: HOME HEALTH SERVICES | Facility: HOME HEALTH | Age: 59
End: 2024-05-13
Payer: COMMERCIAL

## 2024-05-13 ENCOUNTER — LAB (OUTPATIENT)
Dept: LAB | Facility: LAB | Age: 59
End: 2024-05-13
Payer: COMMERCIAL

## 2024-05-13 VITALS
BODY MASS INDEX: 23.77 KG/M2 | SYSTOLIC BLOOD PRESSURE: 169 MMHG | RESPIRATION RATE: 11 BRPM | HEIGHT: 71 IN | WEIGHT: 169.8 LBS | OXYGEN SATURATION: 97 % | HEART RATE: 56 BPM | DIASTOLIC BLOOD PRESSURE: 92 MMHG | TEMPERATURE: 97.9 F

## 2024-05-13 VITALS
DIASTOLIC BLOOD PRESSURE: 72 MMHG | RESPIRATION RATE: 16 BRPM | SYSTOLIC BLOOD PRESSURE: 146 MMHG | HEART RATE: 74 BPM | TEMPERATURE: 98.3 F

## 2024-05-13 DIAGNOSIS — L97.509 DIABETIC FOOT ULCER ASSOCIATED WITH DIABETES MELLITUS DUE TO UNDERLYING CONDITION, UNSPECIFIED LATERALITY, UNSPECIFIED PART OF FOOT, UNSPECIFIED ULCER STAGE (MULTI): Primary | ICD-10-CM

## 2024-05-13 DIAGNOSIS — L97.509 DIABETIC FOOT ULCER ASSOCIATED WITH DIABETES MELLITUS DUE TO UNDERLYING CONDITION, UNSPECIFIED LATERALITY, UNSPECIFIED PART OF FOOT, UNSPECIFIED ULCER STAGE (MULTI): ICD-10-CM

## 2024-05-13 DIAGNOSIS — M86.171 ACUTE OSTEOMYELITIS OF TOE, RIGHT (HCC): Primary | ICD-10-CM

## 2024-05-13 DIAGNOSIS — E11.628 TYPE 2 DIABETES MELLITUS WITH RIGHT DIABETIC FOOT INFECTION (HCC): ICD-10-CM

## 2024-05-13 DIAGNOSIS — E08.621 DIABETIC FOOT ULCER ASSOCIATED WITH DIABETES MELLITUS DUE TO UNDERLYING CONDITION, UNSPECIFIED LATERALITY, UNSPECIFIED PART OF FOOT, UNSPECIFIED ULCER STAGE (MULTI): ICD-10-CM

## 2024-05-13 DIAGNOSIS — E08.621 DIABETIC FOOT ULCER ASSOCIATED WITH DIABETES MELLITUS DUE TO UNDERLYING CONDITION, UNSPECIFIED LATERALITY, UNSPECIFIED PART OF FOOT, UNSPECIFIED ULCER STAGE (MULTI): Primary | ICD-10-CM

## 2024-05-13 DIAGNOSIS — L08.9 TYPE 2 DIABETES MELLITUS WITH RIGHT DIABETIC FOOT INFECTION (HCC): ICD-10-CM

## 2024-05-13 LAB
ALBUMIN SERPL BCP-MCNC: 3.8 G/DL (ref 3.4–5)
ALP SERPL-CCNC: 87 U/L (ref 33–120)
ALT SERPL W P-5'-P-CCNC: 10 U/L (ref 10–52)
ANION GAP SERPL CALC-SCNC: 11 MMOL/L (ref 10–20)
AST SERPL W P-5'-P-CCNC: 11 U/L (ref 9–39)
BILIRUB SERPL-MCNC: 0.3 MG/DL (ref 0–1.2)
BUN SERPL-MCNC: 20 MG/DL (ref 6–23)
CALCIUM SERPL-MCNC: 8.9 MG/DL (ref 8.6–10.3)
CHLORIDE SERPL-SCNC: 103 MMOL/L (ref 98–107)
CO2 SERPL-SCNC: 27 MMOL/L (ref 21–32)
CREAT SERPL-MCNC: 0.96 MG/DL (ref 0.5–1.3)
CRP SERPL-MCNC: 1.02 MG/DL
EGFRCR SERPLBLD CKD-EPI 2021: >90 ML/MIN/1.73M*2
ERYTHROCYTE [DISTWIDTH] IN BLOOD BY AUTOMATED COUNT: 14.6 % (ref 11.5–14.5)
GLUCOSE SERPL-MCNC: 147 MG/DL (ref 74–99)
HCT VFR BLD AUTO: 40.8 % (ref 41–52)
HGB BLD-MCNC: 13 G/DL (ref 13.5–17.5)
MCH RBC QN AUTO: 29.4 PG (ref 26–34)
MCHC RBC AUTO-ENTMCNC: 31.9 G/DL (ref 32–36)
MCV RBC AUTO: 92 FL (ref 80–100)
NRBC BLD-RTO: 0 /100 WBCS (ref 0–0)
PLATELET # BLD AUTO: 184 X10*3/UL (ref 150–450)
POTASSIUM SERPL-SCNC: 4.3 MMOL/L (ref 3.5–5.3)
PROT SERPL-MCNC: 6.9 G/DL (ref 6.4–8.2)
RBC # BLD AUTO: 4.42 X10*6/UL (ref 4.5–5.9)
SODIUM SERPL-SCNC: 137 MMOL/L (ref 136–145)
WBC # BLD AUTO: 8.9 X10*3/UL (ref 4.4–11.3)

## 2024-05-13 PROCEDURE — G8420 CALC BMI NORM PARAMETERS: HCPCS | Performed by: INTERNAL MEDICINE

## 2024-05-13 PROCEDURE — 3077F SYST BP >= 140 MM HG: CPT | Performed by: INTERNAL MEDICINE

## 2024-05-13 PROCEDURE — 3079F DIAST BP 80-89 MM HG: CPT | Performed by: INTERNAL MEDICINE

## 2024-05-13 PROCEDURE — 4004F PT TOBACCO SCREEN RCVD TLK: CPT | Performed by: INTERNAL MEDICINE

## 2024-05-13 PROCEDURE — 85027 COMPLETE CBC AUTOMATED: CPT

## 2024-05-13 PROCEDURE — 86140 C-REACTIVE PROTEIN: CPT

## 2024-05-13 PROCEDURE — 2022F DILAT RTA XM EVC RTNOPTHY: CPT | Performed by: INTERNAL MEDICINE

## 2024-05-13 PROCEDURE — 3052F HG A1C>EQUAL 8.0%<EQUAL 9.0%: CPT | Performed by: INTERNAL MEDICINE

## 2024-05-13 PROCEDURE — G8428 CUR MEDS NOT DOCUMENT: HCPCS | Performed by: INTERNAL MEDICINE

## 2024-05-13 PROCEDURE — 99213 OFFICE O/P EST LOW 20 MIN: CPT | Performed by: INTERNAL MEDICINE

## 2024-05-13 PROCEDURE — 80053 COMPREHEN METABOLIC PANEL: CPT

## 2024-05-13 PROCEDURE — G0299 HHS/HOSPICE OF RN EA 15 MIN: HCPCS

## 2024-05-13 PROCEDURE — 3017F COLORECTAL CA SCREEN DOC REV: CPT | Performed by: INTERNAL MEDICINE

## 2024-05-13 ASSESSMENT — PATIENT HEALTH QUESTIONNAIRE - PHQ9
SUM OF ALL RESPONSES TO PHQ QUESTIONS 1-9: 0
1. LITTLE INTEREST OR PLEASURE IN DOING THINGS: NOT AT ALL
SUM OF ALL RESPONSES TO PHQ QUESTIONS 1-9: 0
SUM OF ALL RESPONSES TO PHQ QUESTIONS 1-9: 0
2. FEELING DOWN, DEPRESSED OR HOPELESS: NOT AT ALL
SUM OF ALL RESPONSES TO PHQ QUESTIONS 1-9: 0
SUM OF ALL RESPONSES TO PHQ9 QUESTIONS 1 & 2: 0

## 2024-05-13 ASSESSMENT — ENCOUNTER SYMPTOMS
PAIN LOCATION - PAIN SEVERITY: 5/10
CHANGE IN APPETITE: UNCHANGED
LOWEST PAIN SEVERITY IN PAST 24 HOURS: 3/10
HIGHEST PAIN SEVERITY IN PAST 24 HOURS: 6/10
PAIN SEVERITY GOAL: 4/10
PAIN LOCATION: RIGHT FOOT
MUSCLE WEAKNESS: 1
PERSON REPORTING PAIN: PATIENT
LOWER EXTREMITY EDEMA: 1
PAIN: 1

## 2024-05-13 NOTE — PROGRESS NOTES
Tee Jj (:  1965) is a 58 y.o. male,Established patient, here for evaluation of the following chief complaint(s):  Follow-up (Hosp f/u, Axel from [facility] )      Assessment & Plan   Right great toe acute osteomyelitis and wet gangrene, improving status post right partial first ray amputation with removal of hallux and metatarsal head on   Insulin-dependent diabetes mellitus 2 with right great toe diabetic foot infection status postdebridement, good wound healing  Haemophilus parainfluenza and Streptococcus anginosus, Anaerobic bacteria as cause of infection    Finish current supply of IV Zosyn, patient reports he has less than 1 week course left  DC PICC line after goals  Follow-up as needed  Patient was instructed to continue with local wound care and to stop cigarette smoking  I over emphasized the need to get his diabetes well-controlled  Follow-up with Dr. Fortune  Subjective   HPI  Follow-up [facility] hospitalization for right great toe acute osteomyelitis status post amputation on , discharged by Dr. Feliciano on IV Zosyn, well-tolerated.   Patient reports his diabetes mellitus 2 to be better controlled on insulin and metformin.   Glucose levels ranging around 140.   He continues to smoke cigarettes  Right great toe amputation wound is healing.  He has a dry scab.     Review of Systems   No fevers or chills  No GI symptoms  No rash or mouth soreness  No pain  Right foot incision with good healing  No redness or drainage  No issues with PICC line    Objective   Physical Exam     Vitals:    24 1126 24 1130   BP: (!) 164/82 (!) 169/92   Site: Right Upper Arm Right Upper Arm   Position: Sitting Sitting   Cuff Size: Medium Adult Medium Adult   Pulse: 56 56   Resp: 11    Temp: 97.9 °F (36.6 °C)    TempSrc: Temporal    SpO2: 97%    Weight: 77 kg (169 lb 12.8 oz)    Height: 1.803 m (5' 11\")      General Appearance: alert and oriented, well-developed and

## 2024-05-13 NOTE — PROGRESS NOTES
[facility] rec'd orders from [facility] ID: therapy complete after all doses infused on 5/14    HC RN to remove PICC after last dose    PICC pull orders entered into [employer], gold copy to intake, secure chat to HC RN     Chart forwarded to patient care rep to discharge from pharmacy service

## 2024-05-15 ENCOUNTER — HOME CARE VISIT (OUTPATIENT)
Dept: HOME HEALTH SERVICES | Facility: HOME HEALTH | Age: 59
End: 2024-05-15
Payer: COMMERCIAL

## 2024-05-15 ENCOUNTER — PATIENT OUTREACH (OUTPATIENT)
Dept: CARE COORDINATION | Facility: CLINIC | Age: 59
End: 2024-05-15
Payer: COMMERCIAL

## 2024-05-15 PROCEDURE — G0299 HHS/HOSPICE OF RN EA 15 MIN: HCPCS

## 2024-05-15 ASSESSMENT — ENCOUNTER SYMPTOMS
SHORTNESS OF BREATH: 0
VOMITING: 0
BACK PAIN: 0
ABDOMINAL PAIN: 0
NAUSEA: 0
COLOR CHANGE: 0
CONSTIPATION: 0

## 2024-05-16 VITALS
SYSTOLIC BLOOD PRESSURE: 132 MMHG | DIASTOLIC BLOOD PRESSURE: 86 MMHG | HEART RATE: 80 BPM | TEMPERATURE: 98 F | RESPIRATION RATE: 16 BRPM

## 2024-05-16 ASSESSMENT — ENCOUNTER SYMPTOMS: DENIES PAIN: 1

## 2024-05-16 ASSESSMENT — ACTIVITIES OF DAILY LIVING (ADL)
HOME_HEALTH_OASIS: 00
OASIS_M1830: 00

## 2024-05-24 DIAGNOSIS — S98.111A AMPUTATION OF RIGHT GREAT TOE (HCC): ICD-10-CM

## 2024-05-24 DIAGNOSIS — E11.9 TYPE 2 DIABETES MELLITUS WITHOUT COMPLICATION, WITHOUT LONG-TERM CURRENT USE OF INSULIN (HCC): ICD-10-CM

## 2024-05-24 RX ORDER — GABAPENTIN 300 MG/1
600 CAPSULE ORAL 3 TIMES DAILY
Qty: 120 CAPSULE | Refills: 0 | Status: CANCELLED | OUTPATIENT
Start: 2024-05-24 | End: 2024-06-13

## 2024-05-24 NOTE — TELEPHONE ENCOUNTER
Rx request   Requested Prescriptions     Pending Prescriptions Disp Refills    gabapentin (NEURONTIN) 300 MG capsule 120 capsule 0     Sig: Take 2 capsules by mouth 3 times daily for 20 days. Start at night    insulin glargine (LANTUS SOLOSTAR) 100 UNIT/ML injection pen 5 Adjustable Dose Pre-filled Pen Syringe 5     Sig: Inject 20 Units into the skin nightly    oxyCODONE (ROXICODONE) 5 MG immediate release tablet       Sig: Take by mouth.     LOV 5/9/2024  Next Visit Date:  Future Appointments   Date Time Provider Department Center   7/11/2024  3:45 PM Geovanni Ford, APRN - CNP [facility]

## 2024-05-29 DIAGNOSIS — E11.9 TYPE 2 DIABETES MELLITUS WITHOUT COMPLICATION, WITHOUT LONG-TERM CURRENT USE OF INSULIN (HCC): ICD-10-CM

## 2024-05-29 DIAGNOSIS — S98.111A AMPUTATION OF RIGHT GREAT TOE (HCC): ICD-10-CM

## 2024-05-29 RX ORDER — INSULIN LISPRO 100 [IU]/ML
INJECTION, SOLUTION INTRAVENOUS; SUBCUTANEOUS
OUTPATIENT
Start: 2024-05-29

## 2024-05-29 RX ORDER — OXYCODONE HYDROCHLORIDE 5 MG/1
TABLET ORAL
OUTPATIENT
Start: 2024-05-29

## 2024-05-29 RX ORDER — INSULIN GLARGINE 100 [IU]/ML
20 INJECTION, SOLUTION SUBCUTANEOUS NIGHTLY
Qty: 5 ADJUSTABLE DOSE PRE-FILLED PEN SYRINGE | Refills: 5 | OUTPATIENT
Start: 2024-05-29

## 2024-05-29 RX ORDER — GABAPENTIN 300 MG/1
600 CAPSULE ORAL 3 TIMES DAILY
Qty: 120 CAPSULE | Refills: 0 | Status: CANCELLED | OUTPATIENT
Start: 2024-05-29 | End: 2024-06-18

## 2024-05-29 RX ORDER — INSULIN GLARGINE 100 [IU]/ML
20 INJECTION, SOLUTION SUBCUTANEOUS NIGHTLY
Qty: 5 ADJUSTABLE DOSE PRE-FILLED PEN SYRINGE | Refills: 5 | Status: SHIPPED | OUTPATIENT
Start: 2024-05-29

## 2024-05-29 RX ORDER — GABAPENTIN 300 MG/1
600 CAPSULE ORAL 3 TIMES DAILY
Qty: 120 CAPSULE | Refills: 0 | Status: SHIPPED | OUTPATIENT
Start: 2024-05-29 | End: 2024-06-18

## 2024-05-29 NOTE — TELEPHONE ENCOUNTER
Please let pt. Know I sent in insulin and gabapentin.  As discussed during last appt. We will start to titrate back on pain medication following surgery.  If pt. Is still having issues with intermittent pain can trial a short course of Tramadol.  If pain is still constant or severe would suggest following up with specialist/surgeon

## 2024-06-24 ENCOUNTER — TELEPHONE (OUTPATIENT)
Dept: FAMILY MEDICINE CLINIC | Age: 59
End: 2024-06-24

## 2024-06-24 NOTE — TELEPHONE ENCOUNTER
----- Message from GEN Mccarthy CNP sent at 6/19/2024 11:01 PM EDT -----  Please call patient and notify due for colorectal cancer screening, please complete Cologuard that was ordered previously and will call with results once completed.

## 2024-07-24 DIAGNOSIS — S98.111A AMPUTATION OF RIGHT GREAT TOE (HCC): ICD-10-CM

## 2024-07-24 NOTE — TELEPHONE ENCOUNTER
Comments:     Last Office Visit (last PCP visit):   5/9/2024    Next Visit Date:  Future Appointments   Date Time Provider Department Center   8/2/2024  4:15 PM Geovanni Ford APRN - CNP [facility]       **If hasn't been seen in over a year OR hasn't followed up according to last diabetes/ADHD visit, make appointment for patient before sending refill to provider.    Rx requested:  Requested Prescriptions     Pending Prescriptions Disp Refills    gabapentin (NEURONTIN) 300 MG capsule 120 capsule 0     Sig: Take 2 capsules by mouth 3 times daily for 20 days. Start at night

## 2024-07-25 RX ORDER — GABAPENTIN 300 MG/1
600 CAPSULE ORAL 3 TIMES DAILY
Qty: 120 CAPSULE | Refills: 0 | Status: SHIPPED | OUTPATIENT
Start: 2024-07-25 | End: 2024-08-14

## 2024-09-06 DIAGNOSIS — S98.111A AMPUTATION OF RIGHT GREAT TOE (HCC): ICD-10-CM

## 2024-09-06 RX ORDER — GABAPENTIN 300 MG/1
600 CAPSULE ORAL 3 TIMES DAILY
Qty: 120 CAPSULE | Refills: 0 | Status: SHIPPED | OUTPATIENT
Start: 2024-09-06 | End: 2024-09-26

## 2024-09-06 NOTE — TELEPHONE ENCOUNTER
Comments:     Last Office Visit (last PCP visit):   5/9/2024    Next Visit Date:  Future Appointments   Date Time Provider Department Center   9/20/2024  4:15 PM Geovanni Ford, GEN - CNP [facility]       **If hasn't been seen in over a year OR hasn't followed up according to last diabetes/ADHD visit, make appointment for patient before sending refill to provider.    Rx requested:  Requested Prescriptions     Pending Prescriptions Disp Refills    gabapentin (NEURONTIN) 300 MG capsule 120 capsule 0     Sig: Take 2 capsules by mouth 3 times daily for 20 days. Start at night

## 2024-10-30 DIAGNOSIS — S98.111A AMPUTATION OF RIGHT GREAT TOE (HCC): ICD-10-CM

## 2024-10-31 RX ORDER — GABAPENTIN 300 MG/1
600 CAPSULE ORAL 3 TIMES DAILY
Qty: 120 CAPSULE | Refills: 0 | Status: SHIPPED | OUTPATIENT
Start: 2024-10-31 | End: 2024-11-20

## 2024-11-04 ENCOUNTER — OFFICE VISIT (OUTPATIENT)
Dept: FAMILY MEDICINE CLINIC | Age: 59
End: 2024-11-04
Payer: COMMERCIAL

## 2024-11-04 VITALS
HEART RATE: 60 BPM | DIASTOLIC BLOOD PRESSURE: 80 MMHG | HEIGHT: 71 IN | SYSTOLIC BLOOD PRESSURE: 134 MMHG | WEIGHT: 184.4 LBS | OXYGEN SATURATION: 97 % | BODY MASS INDEX: 25.81 KG/M2

## 2024-11-04 DIAGNOSIS — I10 ESSENTIAL HYPERTENSION: Primary | ICD-10-CM

## 2024-11-04 DIAGNOSIS — L03.115 CELLULITIS OF RIGHT LEG: ICD-10-CM

## 2024-11-04 DIAGNOSIS — S91.301A OPEN WOUND OF RIGHT FOOT, INITIAL ENCOUNTER: ICD-10-CM

## 2024-11-04 PROCEDURE — 3075F SYST BP GE 130 - 139MM HG: CPT | Performed by: NURSE PRACTITIONER

## 2024-11-04 PROCEDURE — 3079F DIAST BP 80-89 MM HG: CPT | Performed by: NURSE PRACTITIONER

## 2024-11-04 PROCEDURE — 3017F COLORECTAL CA SCREEN DOC REV: CPT | Performed by: NURSE PRACTITIONER

## 2024-11-04 PROCEDURE — G8419 CALC BMI OUT NRM PARAM NOF/U: HCPCS | Performed by: NURSE PRACTITIONER

## 2024-11-04 PROCEDURE — 99214 OFFICE O/P EST MOD 30 MIN: CPT | Performed by: NURSE PRACTITIONER

## 2024-11-04 PROCEDURE — 4004F PT TOBACCO SCREEN RCVD TLK: CPT | Performed by: NURSE PRACTITIONER

## 2024-11-04 PROCEDURE — G8484 FLU IMMUNIZE NO ADMIN: HCPCS | Performed by: NURSE PRACTITIONER

## 2024-11-04 PROCEDURE — G8427 DOCREV CUR MEDS BY ELIG CLIN: HCPCS | Performed by: NURSE PRACTITIONER

## 2024-11-04 RX ORDER — LISINOPRIL AND HYDROCHLOROTHIAZIDE 20; 25 MG/1; MG/1
1 TABLET ORAL DAILY
Qty: 90 TABLET | Refills: 1 | Status: SHIPPED | OUTPATIENT
Start: 2024-11-04 | End: 2025-05-03

## 2024-11-04 RX ORDER — DICLOXACILLIN SODIUM 250 MG/1
250 CAPSULE ORAL 4 TIMES DAILY
Qty: 40 CAPSULE | Refills: 0 | Status: SHIPPED | OUTPATIENT
Start: 2024-11-04 | End: 2024-11-14

## 2024-11-04 NOTE — PROGRESS NOTES
and Isolation Panel [NHANES]     Frequency of Communication with Friends and Family: More than three times a week     Frequency of Social Gatherings with Friends and Family: Once a week     Attends Yarsani Services: Never     Active Member of Clubs or Organizations: No     Attends Club or Organization Meetings: Never     Marital Status:    Intimate Partner Violence: Not At Risk (3/26/2024)    [facility]    Humiliation, Afraid, Rape, and Kick questionnaire     Fear of Current or Ex-Partner: No     Emotionally Abused: No     Physically Abused: No     Sexually Abused: No   Housing Stability: Low Risk  (3/26/2024)    [facility]    Housing Stability Vital Sign     Unable to Pay for Housing in the Last Year: No     Number of Places Lived in the Last Year: 1     Unstable Housing in the Last Year: No        PHYSICAL EXAM     Vitals:    11/04/24 1617   BP: 134/80   Site: Right Upper Arm   Position: Sitting   Cuff Size: Medium Adult   Pulse: 60   SpO2: 97%   Weight: 83.6 kg (184 lb 6.4 oz)   Height: 1.803 m (5' 11\")     Physical Exam  Vitals and nursing note reviewed.   Constitutional:       General: He is not in acute distress.     Appearance: Normal appearance. He is not diaphoretic.   Eyes:      Extraocular Movements: Extraocular movements intact.      Conjunctiva/sclera: Conjunctivae normal.      Pupils: Pupils are equal, round, and reactive to light.   Neck:      Vascular: No carotid bruit.   Cardiovascular:      Rate and Rhythm: Normal rate and regular rhythm.      Pulses: Normal pulses.      Heart sounds: Normal heart sounds. No murmur heard.  Pulmonary:      Effort: Pulmonary effort is normal. No respiratory distress.      Breath sounds: Normal breath sounds. No wheezing, rhonchi or rales.   Abdominal:      Tenderness: There is no left CVA tenderness.

## 2024-11-05 DIAGNOSIS — L03.115 CELLULITIS OF RIGHT LEG: ICD-10-CM

## 2024-11-09 LAB
BACTERIA SPEC ANAEROBE+AEROBE CULT: ABNORMAL
ORGANISM: ABNORMAL
ORGANISM: ABNORMAL

## 2024-11-11 NOTE — RESULT ENCOUNTER NOTE
Pt aware of results states that the one wound is pretty closed up and the one that was worse is still looking the same but that it feels better.

## 2024-11-12 DIAGNOSIS — S91.309A: Primary | ICD-10-CM

## 2024-11-12 RX ORDER — LEVOFLOXACIN 750 MG/1
750 TABLET, FILM COATED ORAL DAILY
Qty: 7 TABLET | Refills: 0 | Status: SHIPPED | OUTPATIENT
Start: 2024-11-12 | End: 2024-11-19

## 2024-11-13 ASSESSMENT — ENCOUNTER SYMPTOMS
CHEST TIGHTNESS: 0
COLOR CHANGE: 1
DIARRHEA: 0
CONSTIPATION: 0
COUGH: 0
ABDOMINAL PAIN: 0
SHORTNESS OF BREATH: 0
VOMITING: 0
NAUSEA: 0
BLOOD IN STOOL: 0

## 2024-12-31 DIAGNOSIS — S98.111A AMPUTATION OF RIGHT GREAT TOE (HCC): ICD-10-CM

## 2024-12-31 RX ORDER — GABAPENTIN 300 MG/1
600 CAPSULE ORAL 3 TIMES DAILY
Qty: 120 CAPSULE | Refills: 0 | Status: SHIPPED | OUTPATIENT
Start: 2024-12-31 | End: 2025-01-20

## 2024-12-31 NOTE — TELEPHONE ENCOUNTER
Comments:     Last Office Visit (last PCP visit):   11/4/2024    Next Visit Date:  No future appointments.    **If hasn't been seen in over a year OR hasn't followed up according to last diabetes/ADHD visit, make appointment for patient before sending refill to provider.    Rx requested:  Requested Prescriptions     Pending Prescriptions Disp Refills    gabapentin (NEURONTIN) 300 MG capsule 120 capsule 0     Sig: Take 2 capsules by mouth 3 times daily for 20 days. Start at night

## 2025-01-19 ENCOUNTER — HOSPITAL ENCOUNTER (EMERGENCY)
Age: 60
Discharge: HOME | End: 2025-01-19
Attending: EMERGENCY MEDICINE
Payer: COMMERCIAL

## 2025-01-19 ENCOUNTER — APPOINTMENT (OUTPATIENT)
Dept: GENERAL RADIOLOGY | Age: 60
End: 2025-01-19
Payer: COMMERCIAL

## 2025-01-19 VITALS
SYSTOLIC BLOOD PRESSURE: 186 MMHG | RESPIRATION RATE: 18 BRPM | HEIGHT: 71 IN | BODY MASS INDEX: 25.2 KG/M2 | HEART RATE: 70 BPM | TEMPERATURE: 97.7 F | WEIGHT: 180 LBS | OXYGEN SATURATION: 99 % | DIASTOLIC BLOOD PRESSURE: 87 MMHG

## 2025-01-19 DIAGNOSIS — S63.501A SPRAIN OF RIGHT WRIST, INITIAL ENCOUNTER: Primary | ICD-10-CM

## 2025-01-19 PROCEDURE — 73110 X-RAY EXAM OF WRIST: CPT

## 2025-01-19 PROCEDURE — 6360000002 HC RX W HCPCS: Performed by: EMERGENCY MEDICINE

## 2025-01-19 PROCEDURE — 6370000000 HC RX 637 (ALT 250 FOR IP): Performed by: EMERGENCY MEDICINE

## 2025-01-19 PROCEDURE — 99283 EMERGENCY DEPT VISIT LOW MDM: CPT

## 2025-01-19 RX ORDER — PREDNISONE 20 MG/1
40 TABLET ORAL DAILY
Qty: 10 TABLET | Refills: 0 | Status: SHIPPED | OUTPATIENT
Start: 2025-01-19 | End: 2025-01-24

## 2025-01-19 RX ORDER — INDOMETHACIN 25 MG/1
50 CAPSULE ORAL ONCE
Status: COMPLETED | OUTPATIENT
Start: 2025-01-19 | End: 2025-01-19

## 2025-01-19 RX ORDER — INDOMETHACIN 50 MG/1
50 CAPSULE ORAL 2 TIMES DAILY WITH MEALS
Qty: 20 CAPSULE | Refills: 0 | Status: SHIPPED | OUTPATIENT
Start: 2025-01-19

## 2025-01-19 RX ORDER — DEXAMETHASONE 4 MG/1
8 TABLET ORAL ONCE
Status: COMPLETED | OUTPATIENT
Start: 2025-01-19 | End: 2025-01-19

## 2025-01-19 RX ADMIN — DEXAMETHASONE 8 MG: 4 TABLET ORAL at 18:51

## 2025-01-19 RX ADMIN — INDOMETHACIN 50 MG: 25 CAPSULE ORAL at 18:51

## 2025-01-19 ASSESSMENT — PAIN DESCRIPTION - FREQUENCY: FREQUENCY: CONTINUOUS

## 2025-01-19 ASSESSMENT — PAIN DESCRIPTION - DESCRIPTORS: DESCRIPTORS: THROBBING

## 2025-01-19 ASSESSMENT — PAIN DESCRIPTION - ORIENTATION
ORIENTATION: RIGHT
ORIENTATION: RIGHT

## 2025-01-19 ASSESSMENT — PAIN SCALES - GENERAL
PAINLEVEL_OUTOF10: 10
PAINLEVEL_OUTOF10: 10

## 2025-01-19 ASSESSMENT — PAIN - FUNCTIONAL ASSESSMENT: PAIN_FUNCTIONAL_ASSESSMENT: 0-10

## 2025-01-19 ASSESSMENT — PAIN DESCRIPTION - LOCATION
LOCATION: WRIST
LOCATION: WRIST

## 2025-01-19 ASSESSMENT — LIFESTYLE VARIABLES
HOW OFTEN DO YOU HAVE A DRINK CONTAINING ALCOHOL: NEVER
HOW MANY STANDARD DRINKS CONTAINING ALCOHOL DO YOU HAVE ON A TYPICAL DAY: PATIENT DOES NOT DRINK

## 2025-01-19 ASSESSMENT — PAIN DESCRIPTION - PAIN TYPE: TYPE: ACUTE PAIN

## 2025-01-19 NOTE — ED TRIAGE NOTES
Patient c/o of right wrist pain and swelling, that began last week.  Patient denied any known injury. Patient states took tylenol at approx 0900. Patient's right wrist appears swollen.

## 2025-01-19 NOTE — ED PROVIDER NOTES
[facility] EMERGENCY DEPARTMENT  eMERGENCY dEPARTMENT eNCOUnter      Pt Name: Tee Jj  MRN: [id number]  Birthdate 1965  Date of evaluation: 1/19/2025  Provider: Benson Garcia MD    CHIEF COMPLAINT       Chief Complaint   Patient presents with    Wrist Pain     Wrist pain, believes he injured it at work last week. Is unsure how it was injured.      TORY OF PRESENT ILLNESS   (Location/Symptom, Timing/Onset,Context/Setting, Quality, Duration, Modifying Factors, Severity)  Note limiting factors.   Tee Jj is a 59 y.o. male who presents to the emergency department patient come to emergency because of right hand swelling and pain patient is a  he has a pain yesterday denies any injury fall or previous fracture 10 history of gout has no numbness ting to the fingertips increasing pain brought him here rated pain a 6-7 out of 10 worse with    HPI    NursingNotes were reviewed.    REVIEW OF SYSTEMS    (2-9 systems for level 4, 10 or more for level 5)     Review of Systems   Constitutional: Negative.  Negative for activity change and fever.   HENT:  Negative for congestion, drooling, facial swelling, mouth sores, nosebleeds, sinus pressure, sore throat, trouble swallowing and voice change.    Eyes:  Negative for pain, discharge, redness and visual disturbance.   Respiratory:  Negative for cough, choking, chest tightness, shortness of breath, wheezing and stridor.    Cardiovascular:  Negative for chest pain, palpitations and leg swelling.   Gastrointestinal:  Negative for abdominal pain, blood in stool, constipation, diarrhea and vomiting.   Endocrine: Negative for cold intolerance, polyphagia and polyuria.   Genitourinary:  Negative for dysuria, flank pain, frequency, genital sores and urgency.   Musculoskeletal:  Positive for arthralgias and joint swelling. Negative for back pain, neck pain and neck stiffness.   Skin:  Negative for pallor and rash.   Neurological:  Negative for tremors,

## 2025-01-20 NOTE — ED NOTES
DC instructions explained and reviewed. Pt demonstrates understanding.  Patient ambulatory on discharge.

## 2025-01-24 ENCOUNTER — TELEPHONE (OUTPATIENT)
Dept: FAMILY MEDICINE CLINIC | Age: 60
End: 2025-01-24

## 2025-02-04 DIAGNOSIS — S98.111A AMPUTATION OF RIGHT GREAT TOE (HCC): ICD-10-CM

## 2025-02-07 RX ORDER — GABAPENTIN 300 MG/1
600 CAPSULE ORAL 3 TIMES DAILY
Qty: 120 CAPSULE | Refills: 0 | Status: SHIPPED | OUTPATIENT
Start: 2025-02-07 | End: 2025-02-27

## 2025-03-05 DIAGNOSIS — S98.111A AMPUTATION OF RIGHT GREAT TOE: ICD-10-CM

## 2025-03-05 NOTE — TELEPHONE ENCOUNTER
Comments: Refill     Last Office Visit (last PCP visit):   11/4/2024     Next Visit Date:  No future appointments.     **If hasn't been seen in over a year OR hasn't followed up according to last diabetes/ADHD visit, make appointment for patient before sending refill to provider.     Rx requested:    Requested Prescriptions     Pending Prescriptions Disp Refills    gabapentin (NEURONTIN) 300 MG capsule 120 capsule 0     Sig: Take 2 capsules by mouth 3 times daily for 20 days. Start at night    metFORMIN (GLUCOPHAGE) 500 MG tablet 60 tablet 5     Sig: Take 1 tablet by mouth 2 times daily (with meals)

## 2025-03-06 RX ORDER — GABAPENTIN 300 MG/1
600 CAPSULE ORAL 3 TIMES DAILY
Qty: 120 CAPSULE | Refills: 0 | Status: SHIPPED | OUTPATIENT
Start: 2025-03-06 | End: 2025-03-26

## 2025-03-11 ENCOUNTER — OFFICE VISIT (OUTPATIENT)
Dept: FAMILY MEDICINE CLINIC | Age: 60
End: 2025-03-11
Payer: COMMERCIAL

## 2025-03-11 VITALS
BODY MASS INDEX: 26.6 KG/M2 | HEIGHT: 71 IN | OXYGEN SATURATION: 98 % | SYSTOLIC BLOOD PRESSURE: 146 MMHG | HEART RATE: 70 BPM | TEMPERATURE: 97.8 F | WEIGHT: 190 LBS | DIASTOLIC BLOOD PRESSURE: 86 MMHG

## 2025-03-11 DIAGNOSIS — E11.621 DIABETIC ULCER OF RIGHT FOOT DUE TO TYPE 2 DIABETES MELLITUS (HCC): ICD-10-CM

## 2025-03-11 DIAGNOSIS — L97.519 DIABETIC ULCER OF RIGHT FOOT DUE TO TYPE 2 DIABETES MELLITUS (HCC): ICD-10-CM

## 2025-03-11 DIAGNOSIS — Z79.4 TYPE 2 DIABETES MELLITUS WITH OTHER SPECIFIED COMPLICATION, WITH LONG-TERM CURRENT USE OF INSULIN: Primary | ICD-10-CM

## 2025-03-11 DIAGNOSIS — E11.69 TYPE 2 DIABETES MELLITUS WITH OTHER SPECIFIED COMPLICATION, WITH LONG-TERM CURRENT USE OF INSULIN: Primary | ICD-10-CM

## 2025-03-11 LAB — HBA1C MFR BLD: 9 %

## 2025-03-11 PROCEDURE — 3052F HG A1C>EQUAL 8.0%<EQUAL 9.0%: CPT | Performed by: NURSE PRACTITIONER

## 2025-03-11 PROCEDURE — 83036 HEMOGLOBIN GLYCOSYLATED A1C: CPT | Performed by: NURSE PRACTITIONER

## 2025-03-11 PROCEDURE — 2022F DILAT RTA XM EVC RTNOPTHY: CPT | Performed by: NURSE PRACTITIONER

## 2025-03-11 PROCEDURE — 3079F DIAST BP 80-89 MM HG: CPT | Performed by: NURSE PRACTITIONER

## 2025-03-11 PROCEDURE — 3017F COLORECTAL CA SCREEN DOC REV: CPT | Performed by: NURSE PRACTITIONER

## 2025-03-11 PROCEDURE — 4004F PT TOBACCO SCREEN RCVD TLK: CPT | Performed by: NURSE PRACTITIONER

## 2025-03-11 PROCEDURE — G8419 CALC BMI OUT NRM PARAM NOF/U: HCPCS | Performed by: NURSE PRACTITIONER

## 2025-03-11 PROCEDURE — 99214 OFFICE O/P EST MOD 30 MIN: CPT | Performed by: NURSE PRACTITIONER

## 2025-03-11 PROCEDURE — 3077F SYST BP >= 140 MM HG: CPT | Performed by: NURSE PRACTITIONER

## 2025-03-11 PROCEDURE — G8427 DOCREV CUR MEDS BY ELIG CLIN: HCPCS | Performed by: NURSE PRACTITIONER

## 2025-03-11 RX ORDER — LEVOFLOXACIN 750 MG/1
750 TABLET, FILM COATED ORAL DAILY
Qty: 7 TABLET | Refills: 0 | Status: SHIPPED | OUTPATIENT
Start: 2025-03-11 | End: 2025-03-18

## 2025-03-11 RX ORDER — INSULIN GLARGINE 100 [IU]/ML
30 INJECTION, SOLUTION SUBCUTANEOUS NIGHTLY
Qty: 5 ADJUSTABLE DOSE PRE-FILLED PEN SYRINGE | Refills: 5 | Status: SHIPPED | OUTPATIENT
Start: 2025-03-11

## 2025-03-11 SDOH — ECONOMIC STABILITY: FOOD INSECURITY: WITHIN THE PAST 12 MONTHS, THE FOOD YOU BOUGHT JUST DIDN'T LAST AND YOU DIDN'T HAVE MONEY TO GET MORE.: NEVER TRUE

## 2025-03-11 SDOH — ECONOMIC STABILITY: FOOD INSECURITY: WITHIN THE PAST 12 MONTHS, YOU WORRIED THAT YOUR FOOD WOULD RUN OUT BEFORE YOU GOT MONEY TO BUY MORE.: NEVER TRUE

## 2025-03-11 ASSESSMENT — PATIENT HEALTH QUESTIONNAIRE - PHQ9
SUM OF ALL RESPONSES TO PHQ QUESTIONS 1-9: 0
2. FEELING DOWN, DEPRESSED OR HOPELESS: NOT AT ALL
1. LITTLE INTEREST OR PLEASURE IN DOING THINGS: NOT AT ALL

## 2025-03-11 NOTE — PROGRESS NOTES
Date of Visit:  3/18/2025  Patient Name: Tee Jj   Patient :  1965     CHIEF COMPLAINT:     Tee Jj is a 59 y.o. male who presents today for an general visit to be evaluated for the following condition(s):  Chief Complaint   Patient presents with    Foot Swelling     Onset- a week   Location- RT foot   Swelling- Y   Pain- Y   Warmth- Y   Treatments- none        HISTORY OF PRESENT ILLNESS     I reviewed staff HPI/chief complaint and do agree with above    Subjective:  - Presents with infection in right foot, same foot as previous osteomyelitis with right great toe amputation  - Areas of the right plantar region started as a callused area about a week ago, missed work Monday and today due to pain while wearing work boots and ambulating  - Has noticed increased redness and swelling to the right foot with symptoms.  - Pain does extend into the toes denies any pain up into the foot ankle or lower leg  - Drainage from wound on bottom of foot  - Does have neuropathy in bilateral feet  - Continues with use of gabapentin 600 mg 3 times daily, Lantus 30 mg nightly and metformin 500 mg twice daily.  We did attempt to increase this to 1000 mg twice daily but unfortunately due to GI side effects did not tolerate.  - Hemoglobin A1c completed today in office at 9.0%, not routinely checking blood sugars at home            REVIEW OF SYSTEM      Review of Systems   Constitutional:  Negative for chills, diaphoresis, fatigue and fever.   Respiratory:  Negative for chest tightness and shortness of breath.    Cardiovascular:  Negative for chest pain.   Endocrine: Negative for polydipsia, polyphagia and polyuria.   Musculoskeletal:  Positive for gait problem. Negative for joint swelling and myalgias.   Skin:  Positive for color change and wound. Negative for rash.   Neurological:  Negative for dizziness, weakness, light-headedness, numbness and headaches.   Hematological:  Does not bruise/bleed easily.

## 2025-03-15 LAB
BACTERIA SPEC ANAEROBE+AEROBE CULT: ABNORMAL
ORGANISM: ABNORMAL
ORGANISM: ABNORMAL

## 2025-03-16 ENCOUNTER — RESULTS FOLLOW-UP (OUTPATIENT)
Dept: FAMILY MEDICINE CLINIC | Age: 60
End: 2025-03-16

## 2025-03-17 ENCOUNTER — TELEPHONE (OUTPATIENT)
Dept: FAMILY MEDICINE CLINIC | Age: 60
End: 2025-03-17

## 2025-03-17 DIAGNOSIS — L08.9 TYPE 2 DIABETES MELLITUS WITH RIGHT DIABETIC FOOT INFECTION (HCC): Primary | ICD-10-CM

## 2025-03-17 DIAGNOSIS — E11.628 TYPE 2 DIABETES MELLITUS WITH RIGHT DIABETIC FOOT INFECTION (HCC): Primary | ICD-10-CM

## 2025-03-17 RX ORDER — SULFAMETHOXAZOLE AND TRIMETHOPRIM 800; 160 MG/1; MG/1
1 TABLET ORAL 2 TIMES DAILY
Qty: 20 TABLET | Refills: 0 | Status: SHIPPED | OUTPATIENT
Start: 2025-03-17 | End: 2025-03-27

## 2025-03-17 NOTE — TELEPHONE ENCOUNTER
Pt would like to have continuous glucose monitoring system.  A nurse with his insurance came out and said he could have one.  Pt uses the [facility] in [place].    Ankle is still swollen, it did come down some but is still swollen and wondering if another antibiotic should be sent in?     Comments:      Last Office Visit (last PCP visit):   3/11/2025     Next Visit Date:  No future appointments.     **If hasn't been seen in over a year OR hasn't followed up according to last diabetes/ADHD visit, make appointment for patient before sending refill to provider.     Rx requested:    Requested Prescriptions      No prescriptions requested or ordered in this encounter

## 2025-03-18 ASSESSMENT — ENCOUNTER SYMPTOMS
COLOR CHANGE: 1
CHEST TIGHTNESS: 0
SHORTNESS OF BREATH: 0

## 2025-03-19 DIAGNOSIS — E11.628 TYPE 2 DIABETES MELLITUS WITH RIGHT DIABETIC FOOT INFECTION (HCC): Primary | ICD-10-CM

## 2025-03-19 DIAGNOSIS — L08.9 TYPE 2 DIABETES MELLITUS WITH RIGHT DIABETIC FOOT INFECTION (HCC): Primary | ICD-10-CM

## 2025-03-19 RX ORDER — KETOROLAC TROMETHAMINE 30 MG/ML
1 INJECTION, SOLUTION INTRAMUSCULAR; INTRAVENOUS CONTINUOUS
Qty: 1 EACH | Refills: 0 | Status: SHIPPED | OUTPATIENT
Start: 2025-03-19

## 2025-03-19 RX ORDER — HYDROCHLOROTHIAZIDE 12.5 MG/1
1 CAPSULE ORAL
Qty: 6 EACH | Refills: 3 | Status: SHIPPED | OUTPATIENT
Start: 2025-03-19

## 2025-03-19 NOTE — TELEPHONE ENCOUNTER
Please call patient and let him know that I did send in for continuous glucose monitor to with his pharmacy it would be the freestyle chio 3.  They should let him know when it is ready to  if he has any questions about on how to use that he can ask the pharmacist are if he needs further instructions he can always come into the office and we can show patient how to apply the sensor.

## 2025-03-20 DIAGNOSIS — M86.9 OSTEOMYELITIS OF SECOND TOE OF RIGHT FOOT (HCC): Primary | ICD-10-CM

## 2025-03-31 ENCOUNTER — OFFICE VISIT (OUTPATIENT)
Dept: INFECTIOUS DISEASES | Age: 60
End: 2025-03-31
Payer: COMMERCIAL

## 2025-03-31 VITALS
BODY MASS INDEX: 26.6 KG/M2 | WEIGHT: 190 LBS | SYSTOLIC BLOOD PRESSURE: 168 MMHG | TEMPERATURE: 97.5 F | RESPIRATION RATE: 20 BRPM | DIASTOLIC BLOOD PRESSURE: 82 MMHG | HEIGHT: 71 IN | HEART RATE: 69 BPM

## 2025-03-31 DIAGNOSIS — M86.171 OTHER ACUTE OSTEOMYELITIS OF RIGHT FOOT: Primary | ICD-10-CM

## 2025-03-31 DIAGNOSIS — L08.9 TYPE 2 DIABETES MELLITUS WITH RIGHT DIABETIC FOOT INFECTION (HCC): ICD-10-CM

## 2025-03-31 DIAGNOSIS — E11.628 TYPE 2 DIABETES MELLITUS WITH RIGHT DIABETIC FOOT INFECTION (HCC): ICD-10-CM

## 2025-03-31 PROCEDURE — 3017F COLORECTAL CA SCREEN DOC REV: CPT | Performed by: INTERNAL MEDICINE

## 2025-03-31 PROCEDURE — 3079F DIAST BP 80-89 MM HG: CPT | Performed by: INTERNAL MEDICINE

## 2025-03-31 PROCEDURE — 3077F SYST BP >= 140 MM HG: CPT | Performed by: INTERNAL MEDICINE

## 2025-03-31 PROCEDURE — 3052F HG A1C>EQUAL 8.0%<EQUAL 9.0%: CPT | Performed by: INTERNAL MEDICINE

## 2025-03-31 PROCEDURE — G8419 CALC BMI OUT NRM PARAM NOF/U: HCPCS | Performed by: INTERNAL MEDICINE

## 2025-03-31 PROCEDURE — 99204 OFFICE O/P NEW MOD 45 MIN: CPT | Performed by: INTERNAL MEDICINE

## 2025-03-31 PROCEDURE — 4004F PT TOBACCO SCREEN RCVD TLK: CPT | Performed by: INTERNAL MEDICINE

## 2025-03-31 PROCEDURE — G8427 DOCREV CUR MEDS BY ELIG CLIN: HCPCS | Performed by: INTERNAL MEDICINE

## 2025-03-31 PROCEDURE — 2022F DILAT RTA XM EVC RTNOPTHY: CPT | Performed by: INTERNAL MEDICINE

## 2025-03-31 RX ORDER — CEPHALEXIN 500 MG/1
500 CAPSULE ORAL 4 TIMES DAILY
Qty: 120 CAPSULE | Refills: 0 | Status: SHIPPED | OUTPATIENT
Start: 2025-03-31 | End: 2025-04-30

## 2025-03-31 ASSESSMENT — ENCOUNTER SYMPTOMS
RESPIRATORY NEGATIVE: 1
GASTROINTESTINAL NEGATIVE: 1
COLOR CHANGE: 1

## 2025-03-31 NOTE — PROGRESS NOTES
Infectious Disease     Patient Name: Tee Jj  Date: 3/31/2025  YOB: 1965  Medical Record Number: [id number]      Osteomyelitis right 2nd metatarsal   Right diabetic foot infection      History of Present Illness:  Hypertension degenerative joint disease  History of gout diabetes neuropathy  History of amputation of right great toe 2024    Ulceration bottom right foot ongoing for several weeks became red and swollen initially presented family doctor 3/11/2025 cultures grew Streptococcus and Staphylococcus aureus was given 7 days of Bactrim    And debridement of the foot and podiatry office including subcutaneous tissue muscle and tendon bone according to Dr. Eason's note from the [facility] this was on 3/20/2025  Patient was placed on Levaquin    Patient was directed to be admitted several times by Dr. Eason patient apparently refused according to Dr. Eason's notes  Documents of note felt patient needed an MRI and very possibly transmetatarsal amputation        ntains abnormal data Culture, Wound (with Gram Stain)  Order: [id number]   Status: Final result       Next appt: None    Test Result Released: Yes (not seen)    Specimen Information: Foot   6 Result Notes       View Follow-Up Encounter      Component  Ref Range & Units (hover)    WOUND/ABSCESS  Abnormal   Direct Exam:  MODERATE NEUTROPHILS  Direct Exam:  MANY GRAM POSITIVE COCCI IN CLUSTERS  Cult,Wound:  NORMAL SKIN RAMÍREZ  Performed at [address] [address] ([phone]    Organism Beta Strep Group G Abnormal    WOUND/ABSCESS MODERATE GROWTH   Organism Staphylococcus aureus Abnormal    WOUND/ABSCESS HEAVY GROWTH  This isolate is methicillin susceptible.   Resulting Agency [facility]        Susceptibility    Staphylococcus aureus (2)    Antibiotic Interpretation LEANDRO  Method Status    benzylpenicillin Resistant >=0.5 mcg/mL BACTERIAL SUSCEPTIBILITY PANEL BY LEANDRO     clindamycin

## 2025-04-07 ENCOUNTER — TRANSCRIBE ORDERS (OUTPATIENT)
Dept: ADMINISTRATIVE | Age: 60
End: 2025-04-07

## 2025-04-07 DIAGNOSIS — M86.271 SUBACUTE OSTEOMYELITIS OF RIGHT FOOT (HCC): Primary | ICD-10-CM

## 2025-04-21 DIAGNOSIS — S98.111A AMPUTATION OF RIGHT GREAT TOE: ICD-10-CM

## 2025-04-21 NOTE — TELEPHONE ENCOUNTER
Comments:      Last Office Visit (last PCP visit):   3/11/2025     Next Visit Date:    Future Appointments   Date Time Provider Department Center   4/29/2025  8:00 AM [facility] ROOM 1 [facility]        **If hasn't been seen in over a year OR hasn't followed up according to last diabetes/ADHD visit, make appointment for patient before sending refill to provider.     Rx requested:    Requested Prescriptions     Pending Prescriptions Disp Refills    gabapentin (NEURONTIN) 300 MG capsule 120 capsule 0     Sig: Take 2 capsules by mouth 3 times daily for 20 days. Start at night

## 2025-04-22 RX ORDER — GABAPENTIN 300 MG/1
600 CAPSULE ORAL 3 TIMES DAILY
Qty: 120 CAPSULE | Refills: 0 | Status: SHIPPED | OUTPATIENT
Start: 2025-04-22 | End: 2025-05-12

## 2025-04-29 ENCOUNTER — HOSPITAL ENCOUNTER (OUTPATIENT)
Dept: MRI IMAGING | Age: 60
Discharge: HOME | End: 2025-05-01
Payer: COMMERCIAL

## 2025-04-29 DIAGNOSIS — M86.271 SUBACUTE OSTEOMYELITIS OF RIGHT FOOT (HCC): ICD-10-CM

## 2025-04-29 PROCEDURE — 73718 MRI LOWER EXTREMITY W/O DYE: CPT

## 2025-04-30 ENCOUNTER — COMMUNITY OUTREACH (OUTPATIENT)
Dept: FAMILY MEDICINE CLINIC | Age: 60
End: 2025-04-30

## 2025-05-01 ENCOUNTER — TELEPHONE (OUTPATIENT)
Dept: GASTROENTEROLOGY | Age: 60
End: 2025-05-01

## 2025-05-01 DIAGNOSIS — Z12.11 COLON CANCER SCREENING: Primary | ICD-10-CM

## 2025-05-01 NOTE — TELEPHONE ENCOUNTER
Spoke to patient, agreeable to colonoscopy. Patient scheduled 8/2/2025 at 10 AM. Miralax Prep mailed to patient. Referral pended to PCP. Info faxed to [facility].

## 2025-05-08 ENCOUNTER — HOSPITAL ENCOUNTER (OUTPATIENT)
Dept: RADIOLOGY | Facility: HOSPITAL | Age: 60
Discharge: HOME | End: 2025-05-08
Payer: COMMERCIAL

## 2025-05-08 ENCOUNTER — OFFICE VISIT (OUTPATIENT)
Dept: ORTHOPEDIC SURGERY | Facility: CLINIC | Age: 60
End: 2025-05-08
Payer: COMMERCIAL

## 2025-05-08 VITALS — HEIGHT: 71 IN | WEIGHT: 189 LBS | BODY MASS INDEX: 26.46 KG/M2

## 2025-05-08 DIAGNOSIS — M25.521 RIGHT ELBOW PAIN: ICD-10-CM

## 2025-05-08 DIAGNOSIS — M70.21 OLECRANON BURSITIS OF RIGHT ELBOW: ICD-10-CM

## 2025-05-08 PROCEDURE — 73080 X-RAY EXAM OF ELBOW: CPT | Mod: RIGHT SIDE | Performed by: RADIOLOGY

## 2025-05-08 PROCEDURE — 99203 OFFICE O/P NEW LOW 30 MIN: CPT | Performed by: STUDENT IN AN ORGANIZED HEALTH CARE EDUCATION/TRAINING PROGRAM

## 2025-05-08 PROCEDURE — 73080 X-RAY EXAM OF ELBOW: CPT | Mod: RT

## 2025-05-08 PROCEDURE — 3008F BODY MASS INDEX DOCD: CPT | Performed by: STUDENT IN AN ORGANIZED HEALTH CARE EDUCATION/TRAINING PROGRAM

## 2025-05-08 RX ORDER — NAPROXEN 500 MG/1
500 TABLET ORAL
Qty: 28 TABLET | Refills: 1 | Status: SHIPPED | OUTPATIENT
Start: 2025-05-08 | End: 2025-06-05

## 2025-05-08 ASSESSMENT — PATIENT HEALTH QUESTIONNAIRE - PHQ9
1. LITTLE INTEREST OR PLEASURE IN DOING THINGS: NOT AT ALL
SUM OF ALL RESPONSES TO PHQ9 QUESTIONS 1 AND 2: 0
2. FEELING DOWN, DEPRESSED OR HOPELESS: NOT AT ALL

## 2025-05-08 NOTE — PROGRESS NOTES
Acute Injury New Patient Visit    Patient ID: Dionicio Dyer is a 59 y.o. male.   History of Present Illness  The patient is a 59-year-old male presenting with right elbow pain and swelling.    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    PAST SURGICAL HISTORY:  Needs another toe removed.    SOCIAL HISTORY  - Exercise: Yard work       Assessment & Plan  1. Olecranon bursitis:  - Apply compression and padding to prevent further injury  - Provide Ace wrap  - Prescribed Aleve 500 mg, take with food  - Recommend ice application and continuation of anti-inflammatories for 10-14 days  - Educated on signs of infection (redness, heat, drainage, fever, nausea) and advised to seek medical attention if symptoms occur    Follow-up  - Follow up in 3-4 weeks       Assessment:   Problem List Items Addressed This Visit    None  Visit Diagnoses         Right elbow pain        Relevant Orders    XR elbow right 3+ views      Olecranon bursitis of right elbow                Diagnostics: Reviewed all relevant imaging including x-ray, MRI, CT, and US.    Results  Imaging   - Three view x-ray of the right elbow: 05/08/2025, No acute fracture or dislocation. Soft tissue swelling at the posterior elbow overlying the olecranon, concerning for olecranon bursitis.     Procedure:  Procedures  Physical Exam  - Musculoskeletal:    - Right Elbow: No redness or warmth    - Small to medium-sized swelling at the posterior elbow concerning for olecranon bursa with no signs of infection       Orders Placed This Encounter    XR elbow right 3+ views      At the conclusion of the visit there were no further questions by the patient/family regarding their plan of care.  Patient  was instructed to call or return with any issues, questions, or concerns regarding their injury and/or treatment plan described above.     05/08/25 at 8:40 AM - Agustin Ya,     Office: [phone]    This note was prepared using voice recognition software.  The details of this note are correct and have been reviewed, and corrected to the best of my ability.  Some grammatical errors may persist related to the Dragon software.  This medical note was created with the assistance of artificial intelligence (AI) for documentation purposes. The content has been reviewed and confirmed by the healthcare provider for accuracy and completeness. Patient consented to the use of audio recording and use of AI during their visit.

## 2025-05-14 ENCOUNTER — APPOINTMENT (OUTPATIENT)
Dept: ORTHOPEDIC SURGERY | Facility: CLINIC | Age: 60
End: 2025-05-14
Payer: COMMERCIAL

## 2025-05-26 ENCOUNTER — HOSPITAL ENCOUNTER (INPATIENT)
Age: 60
LOS: 2 days | Discharge: HOME | DRG: 469 | End: 2025-05-28
Attending: EMERGENCY MEDICINE | Admitting: INTERNAL MEDICINE
Payer: COMMERCIAL

## 2025-05-26 ENCOUNTER — APPOINTMENT (OUTPATIENT)
Dept: CT IMAGING | Age: 60
DRG: 469 | End: 2025-05-26
Payer: COMMERCIAL

## 2025-05-26 ENCOUNTER — APPOINTMENT (OUTPATIENT)
Dept: GENERAL RADIOLOGY | Age: 60
DRG: 469 | End: 2025-05-26
Attending: EMERGENCY MEDICINE
Payer: COMMERCIAL

## 2025-05-26 DIAGNOSIS — N28.9 RENAL INSUFFICIENCY: ICD-10-CM

## 2025-05-26 DIAGNOSIS — B34.8 PARAINFLUENZA: ICD-10-CM

## 2025-05-26 DIAGNOSIS — I82.B11 OCCLUSION OF RIGHT SUBCLAVIAN VEIN (HCC): ICD-10-CM

## 2025-05-26 DIAGNOSIS — I10 HYPERTENSION, UNSPECIFIED TYPE: ICD-10-CM

## 2025-05-26 DIAGNOSIS — F19.10 POLYSUBSTANCE ABUSE (HCC): ICD-10-CM

## 2025-05-26 DIAGNOSIS — E86.0 DEHYDRATION: Primary | ICD-10-CM

## 2025-05-26 DIAGNOSIS — R29.6 FREQUENT FALLS: ICD-10-CM

## 2025-05-26 LAB
ALBUMIN SERPL-MCNC: 4.3 G/DL (ref 3.5–4.6)
ALP SERPL-CCNC: 120 U/L (ref 35–104)
ALT SERPL-CCNC: 12 U/L (ref 0–41)
AMPHET UR QL SCN: ABNORMAL
ANION GAP SERPL CALCULATED.3IONS-SCNC: 19 MEQ/L (ref 9–15)
APTT PPP: 31.7 SEC (ref 24.4–36.8)
AST SERPL-CCNC: 13 U/L (ref 0–40)
B PARAP IS1001 DNA NPH QL NAA+NON-PROBE: NOT DETECTED
B PERT.PT PRMT NPH QL NAA+NON-PROBE: NOT DETECTED
B-OH-BUTYR SERPL-SCNC: 7.4 MG/DL (ref 0.2–2.8)
BACTERIA URNS QL MICRO: NEGATIVE /HPF
BARBITURATES UR QL SCN: ABNORMAL
BASOPHILS # BLD: 0.1 K/UL (ref 0–0.2)
BASOPHILS NFR BLD: 0.4 %
BENZODIAZ UR QL SCN: ABNORMAL
BILIRUB SERPL-MCNC: 0.9 MG/DL (ref 0.2–0.7)
BILIRUB UR QL STRIP: NEGATIVE
BUN SERPL-MCNC: 29 MG/DL (ref 8–23)
C PNEUM DNA NPH QL NAA+NON-PROBE: NOT DETECTED
CALCIUM SERPL-MCNC: 9.4 MG/DL (ref 8.5–9.9)
CANNABINOIDS UR QL SCN: ABNORMAL
CHLORIDE SERPL-SCNC: 95 MEQ/L (ref 95–107)
CHP ED QC CHECK: YES
CHP ED QC CHECK: YES
CLARITY UR: CLEAR
CO2 SERPL-SCNC: 20 MEQ/L (ref 20–31)
COCAINE UR QL SCN: POSITIVE
COLOR UR: ABNORMAL
CREAT SERPL-MCNC: 1.38 MG/DL (ref 0.7–1.2)
DRUG SCREEN COMMENT UR-IMP: ABNORMAL
EOSINOPHIL # BLD: 0 K/UL (ref 0–0.7)
EOSINOPHIL NFR BLD: 0.1 %
EPI CELLS #/AREA URNS AUTO: ABNORMAL /HPF (ref 0–5)
ERYTHROCYTE [DISTWIDTH] IN BLOOD BY AUTOMATED COUNT: 13.5 % (ref 11.5–14.5)
ETHANOL PERCENT: NORMAL G/DL
ETHANOLAMINE SERPL-MCNC: <10 MG/DL (ref 0–0.08)
FENTANYL SCREEN, URINE: POSITIVE
FLUAV RNA NPH QL NAA+NON-PROBE: NOT DETECTED
FLUBV RNA NPH QL NAA+NON-PROBE: NOT DETECTED
GLOBULIN SER CALC-MCNC: 4.6 G/DL (ref 2.3–3.5)
GLUCOSE BLD-MCNC: 230 MG/DL
GLUCOSE BLD-MCNC: 230 MG/DL (ref 70–99)
GLUCOSE BLD-MCNC: 345 MG/DL
GLUCOSE BLD-MCNC: 345 MG/DL (ref 70–99)
GLUCOSE SERPL-MCNC: 327 MG/DL (ref 70–99)
GLUCOSE UR STRIP-MCNC: 500 MG/DL
HADV DNA NPH QL NAA+NON-PROBE: NOT DETECTED
HCOV 229E RNA NPH QL NAA+NON-PROBE: NOT DETECTED
HCOV HKU1 RNA NPH QL NAA+NON-PROBE: NOT DETECTED
HCOV NL63 RNA NPH QL NAA+NON-PROBE: NOT DETECTED
HCOV OC43 RNA NPH QL NAA+NON-PROBE: NOT DETECTED
HCT VFR BLD AUTO: 49.2 % (ref 42–52)
HGB BLD-MCNC: 17.1 G/DL (ref 14–18)
HGB UR QL STRIP: ABNORMAL
HMPV RNA NPH QL NAA+NON-PROBE: NOT DETECTED
HPIV1 RNA NPH QL NAA+NON-PROBE: NOT DETECTED
HPIV2 RNA NPH QL NAA+NON-PROBE: NOT DETECTED
HPIV3 RNA NPH QL NAA+NON-PROBE: DETECTED
HPIV4 RNA NPH QL NAA+NON-PROBE: NOT DETECTED
HYALINE CASTS #/AREA URNS AUTO: ABNORMAL /HPF (ref 0–5)
INR PPP: 1.1
KETONES UR STRIP-MCNC: 15 MG/DL
LACTIC ACID, SEPSIS: 2.5 MMOL/L (ref 0.5–1.9)
LEUKOCYTE ESTERASE UR QL STRIP: NEGATIVE
LYMPHOCYTES # BLD: 2.2 K/UL (ref 1–4.8)
LYMPHOCYTES NFR BLD: 12.1 %
M PNEUMO DNA NPH QL NAA+NON-PROBE: NOT DETECTED
MAGNESIUM SERPL-MCNC: 2 MG/DL (ref 1.7–2.4)
MCH RBC QN AUTO: 29.5 PG (ref 27–31.3)
MCHC RBC AUTO-ENTMCNC: 34.8 % (ref 33–37)
MCV RBC AUTO: 84.8 FL (ref 79–92.2)
METHADONE UR QL SCN: ABNORMAL
MONOCYTES # BLD: 1.3 K/UL (ref 0.2–0.8)
MONOCYTES NFR BLD: 6.9 %
NEUTROPHILS # BLD: 14.7 K/UL (ref 1.4–6.5)
NEUTS SEG NFR BLD: 80.2 %
NITRITE UR QL STRIP: NEGATIVE
OPIATES UR QL SCN: POSITIVE
OXYCODONE UR QL SCN: ABNORMAL
PCP UR QL SCN: ABNORMAL
PERFORMED ON: ABNORMAL
PERFORMED ON: ABNORMAL
PH UR STRIP: 5 [PH] (ref 5–9)
PLATELET # BLD AUTO: 385 K/UL (ref 130–400)
POC CREATININE WHOLE BLOOD: 1.4
POTASSIUM SERPL-SCNC: 3.1 MEQ/L (ref 3.4–4.9)
PROCALCITONIN SERPL IA-MCNC: 0.06 NG/ML (ref 0–0.15)
PROPOXYPH UR QL SCN: ABNORMAL
PROT SERPL-MCNC: 8.9 G/DL (ref 6.3–8)
PROT UR STRIP-MCNC: 100 MG/DL
PROTHROMBIN TIME: 15.1 SEC (ref 12.3–14.9)
RBC # BLD AUTO: 5.8 M/UL (ref 4.7–6.1)
RBC #/AREA URNS AUTO: ABNORMAL /HPF (ref 0–5)
RSV RNA NPH QL NAA+NON-PROBE: NOT DETECTED
RV+EV RNA NPH QL NAA+NON-PROBE: NOT DETECTED
SARS-COV-2 RNA NPH QL NAA+NON-PROBE: NOT DETECTED
SODIUM SERPL-SCNC: 134 MEQ/L (ref 135–144)
SP GR UR STRIP: 1.03 (ref 1–1.03)
TROPONIN, HIGH SENSITIVITY: 25 NG/L (ref 0–19)
TROPONIN, HIGH SENSITIVITY: 26 NG/L (ref 0–19)
TSH REFLEX: 0.9 UIU/ML (ref 0.44–3.86)
URINE REFLEX TO CULTURE: ABNORMAL
UROBILINOGEN UR STRIP-ACNC: 1 E.U./DL
WBC # BLD AUTO: 18.4 K/UL (ref 4.8–10.8)
WBC #/AREA URNS AUTO: ABNORMAL /HPF (ref 0–5)

## 2025-05-26 PROCEDURE — 80053 COMPREHEN METABOLIC PANEL: CPT

## 2025-05-26 PROCEDURE — 74177 CT ABD & PELVIS W/CONTRAST: CPT

## 2025-05-26 PROCEDURE — 6360000004 HC RX CONTRAST MEDICATION: Performed by: EMERGENCY MEDICINE

## 2025-05-26 PROCEDURE — 87040 BLOOD CULTURE FOR BACTERIA: CPT

## 2025-05-26 PROCEDURE — 73560 X-RAY EXAM OF KNEE 1 OR 2: CPT

## 2025-05-26 PROCEDURE — 84145 PROCALCITONIN (PCT): CPT

## 2025-05-26 PROCEDURE — 6360000002 HC RX W HCPCS: Performed by: EMERGENCY MEDICINE

## 2025-05-26 PROCEDURE — 83735 ASSAY OF MAGNESIUM: CPT

## 2025-05-26 PROCEDURE — 96365 THER/PROPH/DIAG IV INF INIT: CPT

## 2025-05-26 PROCEDURE — 70496 CT ANGIOGRAPHY HEAD: CPT

## 2025-05-26 PROCEDURE — 2580000003 HC RX 258: Performed by: EMERGENCY MEDICINE

## 2025-05-26 PROCEDURE — 80307 DRUG TEST PRSMV CHEM ANLYZR: CPT

## 2025-05-26 PROCEDURE — 82010 KETONE BODYS QUAN: CPT

## 2025-05-26 PROCEDURE — 70498 CT ANGIOGRAPHY NECK: CPT

## 2025-05-26 PROCEDURE — 84443 ASSAY THYROID STIM HORMONE: CPT

## 2025-05-26 PROCEDURE — 93005 ELECTROCARDIOGRAM TRACING: CPT | Performed by: EMERGENCY MEDICINE

## 2025-05-26 PROCEDURE — 0202U NFCT DS 22 TRGT SARS-COV-2: CPT

## 2025-05-26 PROCEDURE — 73090 X-RAY EXAM OF FOREARM: CPT

## 2025-05-26 PROCEDURE — 1210000000 HC MED SURG R&B

## 2025-05-26 PROCEDURE — 85610 PROTHROMBIN TIME: CPT

## 2025-05-26 PROCEDURE — 71275 CT ANGIOGRAPHY CHEST: CPT

## 2025-05-26 PROCEDURE — 2500000003 HC RX 250 WO HCPCS: Performed by: EMERGENCY MEDICINE

## 2025-05-26 PROCEDURE — 51701 INSERT BLADDER CATHETER: CPT

## 2025-05-26 PROCEDURE — 81001 URINALYSIS AUTO W/SCOPE: CPT

## 2025-05-26 PROCEDURE — 36415 COLL VENOUS BLD VENIPUNCTURE: CPT

## 2025-05-26 PROCEDURE — 82077 ASSAY SPEC XCP UR&BREATH IA: CPT

## 2025-05-26 PROCEDURE — 96375 TX/PRO/DX INJ NEW DRUG ADDON: CPT

## 2025-05-26 PROCEDURE — 84484 ASSAY OF TROPONIN QUANT: CPT

## 2025-05-26 PROCEDURE — 83605 ASSAY OF LACTIC ACID: CPT

## 2025-05-26 PROCEDURE — 71045 X-RAY EXAM CHEST 1 VIEW: CPT

## 2025-05-26 PROCEDURE — 73080 X-RAY EXAM OF ELBOW: CPT

## 2025-05-26 PROCEDURE — 96361 HYDRATE IV INFUSION ADD-ON: CPT

## 2025-05-26 PROCEDURE — 99285 EMERGENCY DEPT VISIT HI MDM: CPT

## 2025-05-26 PROCEDURE — 85025 COMPLETE CBC W/AUTO DIFF WBC: CPT

## 2025-05-26 PROCEDURE — 85730 THROMBOPLASTIN TIME PARTIAL: CPT

## 2025-05-26 PROCEDURE — 6370000000 HC RX 637 (ALT 250 FOR IP): Performed by: EMERGENCY MEDICINE

## 2025-05-26 RX ORDER — LORAZEPAM 2 MG/ML
1 INJECTION INTRAMUSCULAR ONCE
Status: COMPLETED | OUTPATIENT
Start: 2025-05-26 | End: 2025-05-26

## 2025-05-26 RX ORDER — METRONIDAZOLE 500 MG/100ML
500 INJECTION, SOLUTION INTRAVENOUS ONCE
Status: COMPLETED | OUTPATIENT
Start: 2025-05-26 | End: 2025-05-26

## 2025-05-26 RX ORDER — 0.9 % SODIUM CHLORIDE 0.9 %
500 INTRAVENOUS SOLUTION INTRAVENOUS ONCE
Status: COMPLETED | OUTPATIENT
Start: 2025-05-26 | End: 2025-05-27

## 2025-05-26 RX ORDER — CLONIDINE HYDROCHLORIDE 0.1 MG/1
0.2 TABLET ORAL ONCE
Status: COMPLETED | OUTPATIENT
Start: 2025-05-26 | End: 2025-05-26

## 2025-05-26 RX ORDER — 0.9 % SODIUM CHLORIDE 0.9 %
2000 INTRAVENOUS SOLUTION INTRAVENOUS ONCE
Status: COMPLETED | OUTPATIENT
Start: 2025-05-26 | End: 2025-05-26

## 2025-05-26 RX ORDER — MORPHINE SULFATE 4 MG/ML
6 INJECTION, SOLUTION INTRAMUSCULAR; INTRAVENOUS ONCE
Status: COMPLETED | OUTPATIENT
Start: 2025-05-26 | End: 2025-05-26

## 2025-05-26 RX ORDER — ONDANSETRON 2 MG/ML
4 INJECTION INTRAMUSCULAR; INTRAVENOUS ONCE
Status: COMPLETED | OUTPATIENT
Start: 2025-05-26 | End: 2025-05-26

## 2025-05-26 RX ORDER — IOPAMIDOL 755 MG/ML
150 INJECTION, SOLUTION INTRAVASCULAR
Status: COMPLETED | OUTPATIENT
Start: 2025-05-26 | End: 2025-05-26

## 2025-05-26 RX ADMIN — METRONIDAZOLE 500 MG: 500 INJECTION, SOLUTION INTRAVENOUS at 22:01

## 2025-05-26 RX ADMIN — Medication 1 MG: at 22:40

## 2025-05-26 RX ADMIN — INSULIN HUMAN 8 UNITS: 100 INJECTION, SOLUTION PARENTERAL at 22:03

## 2025-05-26 RX ADMIN — MORPHINE SULFATE 6 MG: 4 INJECTION, SOLUTION INTRAMUSCULAR; INTRAVENOUS at 19:39

## 2025-05-26 RX ADMIN — SODIUM CHLORIDE 2000 ML: 0.9 INJECTION, SOLUTION INTRAVENOUS at 19:38

## 2025-05-26 RX ADMIN — Medication 1 MG: at 23:10

## 2025-05-26 RX ADMIN — ONDANSETRON 4 MG: 2 INJECTION, SOLUTION INTRAMUSCULAR; INTRAVENOUS at 19:39

## 2025-05-26 RX ADMIN — CLONIDINE HYDROCHLORIDE 0.2 MG: 0.1 TABLET ORAL at 23:09

## 2025-05-26 RX ADMIN — IOPAMIDOL 150 ML: 755 INJECTION, SOLUTION INTRAVENOUS at 21:52

## 2025-05-26 RX ADMIN — WATER 1000 MG: 1 INJECTION INTRAMUSCULAR; INTRAVENOUS; SUBCUTANEOUS at 21:58

## 2025-05-26 RX ADMIN — SODIUM CHLORIDE 500 ML: 0.9 INJECTION, SOLUTION INTRAVENOUS at 23:10

## 2025-05-26 ASSESSMENT — PAIN DESCRIPTION - ORIENTATION: ORIENTATION: LEFT

## 2025-05-26 ASSESSMENT — ENCOUNTER SYMPTOMS
VOMITING: 1
SHORTNESS OF BREATH: 0
DIARRHEA: 1
NAUSEA: 1
BACK PAIN: 0
ABDOMINAL PAIN: 0

## 2025-05-26 ASSESSMENT — PAIN DESCRIPTION - ONSET: ONSET: ON-GOING

## 2025-05-26 ASSESSMENT — PAIN DESCRIPTION - LOCATION: LOCATION: COCCYX;KNEE

## 2025-05-26 ASSESSMENT — PAIN DESCRIPTION - PAIN TYPE: TYPE: ACUTE PAIN

## 2025-05-26 ASSESSMENT — PAIN DESCRIPTION - FREQUENCY: FREQUENCY: CONTINUOUS

## 2025-05-26 ASSESSMENT — PAIN SCALES - GENERAL
PAINLEVEL_OUTOF10: 10
PAINLEVEL_OUTOF10: 10

## 2025-05-26 ASSESSMENT — PAIN - FUNCTIONAL ASSESSMENT: PAIN_FUNCTIONAL_ASSESSMENT: 0-10

## 2025-05-26 NOTE — ED NOTES
Pt states he has passed out and fallen every day for the past 5 days. Pt states he gets dizzy, then falls. Pt states his heart \"doesn't feel right\" and he has been losing weight. Pt states he has not gotten out of bed for a week due to weakness.

## 2025-05-26 NOTE — ED PROVIDER NOTES
[facility] EMERGENCY DEPARTMENT  EMERGENCY DEPARTMENT ENCOUNTER      Pt Name: Tee Jj  MRN: [id number]  Birthdate 1965  Date of evaluation: 5/26/2025  Provider: Wei Nuñez MD  Note Started: 5/26/25 6:58 PM EDT    CHIEF COMPLAINT       Chief Complaint   Patient presents with    Dizziness     N/V/D, hear palpations          HISTORY OF PRESENT ILLNESS   (Location/Symptom, Timing/Onset, Context/Setting, Quality, Duration, Modifying Factors, Severity)  Note limiting factors.   Tee Jj is a 59 y.o. male who presents to the emergency department with multiple complaints.  History comes from the patient.  He lives at home with his wife and daughter.  He says over the past 5 days he has fallen seven times.  He says that sometimes he actually passes out and is amnestic to events.  Says that sometimes he simply tripped because of his neuropathy, other times he feels dizzy like the room is spinning and other times he feels lightheaded like he might pass out.  Unsure if he hit his head.  He says that since the falls he has right elbow and left forearm and left knee and tailbone pain.  He has been having nausea and vomiting, nonbloody.  Says he has not eaten in 3 days.  Associated nonbloody diarrhea.  Denies fever, headache or double vision, speech difficulty, neck pain, chest pain, abdominal pain, testicular pain, back pain, new numbness or weakness.  He says that he is currently being scheduled to have a right toe amputation.  Chart review notes that he is established with podiatry for subacute osteomyelitis of the right foot had an MRI last month as well as sees orthopedics for  prev right elbow pain  HPI  Chart review notes history of osteoarthritis, hypertension, diabetes on gabapentin  Nursing Notes were reviewed.    REVIEW OF SYSTEMS    (2-9 systems for level 4, 10 or more for level 5)     Review of Systems   Constitutional:  Negative for fever.        Frequent falls   HENT:  Negative for

## 2025-05-27 PROBLEM — R55 SYNCOPE: Status: ACTIVE | Noted: 2025-05-27

## 2025-05-27 PROBLEM — B34.8 PARAINFLUENZA: Status: ACTIVE | Noted: 2025-05-27

## 2025-05-27 PROBLEM — K52.9 ACUTE GASTROENTERITIS: Status: ACTIVE | Noted: 2025-05-27

## 2025-05-27 LAB
ALBUMIN SERPL-MCNC: 3.5 G/DL (ref 3.5–4.6)
ALP SERPL-CCNC: 94 U/L (ref 35–104)
ALT SERPL-CCNC: 6 U/L (ref 0–41)
ANION GAP SERPL CALCULATED.3IONS-SCNC: 13 MEQ/L (ref 9–15)
AST SERPL-CCNC: 12 U/L (ref 0–40)
BASOPHILS # BLD: 0.1 K/UL (ref 0–0.2)
BASOPHILS NFR BLD: 0.4 %
BILIRUB SERPL-MCNC: 0.7 MG/DL (ref 0.2–0.7)
BUN SERPL-MCNC: 21 MG/DL (ref 8–23)
CALCIUM SERPL-MCNC: 8.3 MG/DL (ref 8.5–9.9)
CHLORIDE SERPL-SCNC: 108 MEQ/L (ref 95–107)
CO2 SERPL-SCNC: 20 MEQ/L (ref 20–31)
CREAT SERPL-MCNC: 0.95 MG/DL (ref 0.7–1.2)
EOSINOPHIL # BLD: 0.1 K/UL (ref 0–0.7)
EOSINOPHIL NFR BLD: 0.4 %
ERYTHROCYTE [DISTWIDTH] IN BLOOD BY AUTOMATED COUNT: 13.8 % (ref 11.5–14.5)
GLOBULIN SER CALC-MCNC: 3.6 G/DL (ref 2.3–3.5)
GLUCOSE BLD-MCNC: 165 MG/DL (ref 70–99)
GLUCOSE BLD-MCNC: 175 MG/DL (ref 70–99)
GLUCOSE BLD-MCNC: 179 MG/DL (ref 70–99)
GLUCOSE BLD-MCNC: 212 MG/DL (ref 70–99)
GLUCOSE BLD-MCNC: 240 MG/DL (ref 70–99)
GLUCOSE SERPL-MCNC: 183 MG/DL (ref 70–99)
HCT VFR BLD AUTO: 41.4 % (ref 42–52)
HGB BLD-MCNC: 14.5 G/DL (ref 14–18)
LACTATE BLDV-SCNC: 0.8 MMOL/L (ref 0.5–2.2)
LYMPHOCYTES # BLD: 2.6 K/UL (ref 1–4.8)
LYMPHOCYTES NFR BLD: 15.9 %
MCH RBC QN AUTO: 29.5 PG (ref 27–31.3)
MCHC RBC AUTO-ENTMCNC: 35 % (ref 33–37)
MCV RBC AUTO: 84.1 FL (ref 79–92.2)
MONOCYTES # BLD: 1.2 K/UL (ref 0.2–0.8)
MONOCYTES NFR BLD: 7.1 %
NEUTROPHILS # BLD: 12.6 K/UL (ref 1.4–6.5)
NEUTS SEG NFR BLD: 75.9 %
PERFORMED ON: ABNORMAL
PLATELET # BLD AUTO: 304 K/UL (ref 130–400)
POC CREATININE: 1.4 MG/DL (ref 0.8–1.3)
POC SAMPLE TYPE: ABNORMAL
POTASSIUM SERPL-SCNC: 3.6 MEQ/L (ref 3.4–4.9)
PROT SERPL-MCNC: 7.1 G/DL (ref 6.3–8)
RBC # BLD AUTO: 4.92 M/UL (ref 4.7–6.1)
SODIUM SERPL-SCNC: 141 MEQ/L (ref 135–144)
WBC # BLD AUTO: 16.5 K/UL (ref 4.8–10.8)

## 2025-05-27 PROCEDURE — 85025 COMPLETE CBC W/AUTO DIFF WBC: CPT

## 2025-05-27 PROCEDURE — 99221 1ST HOSP IP/OBS SF/LOW 40: CPT | Performed by: PAIN MEDICINE

## 2025-05-27 PROCEDURE — 6370000000 HC RX 637 (ALT 250 FOR IP): Performed by: INTERNAL MEDICINE

## 2025-05-27 PROCEDURE — 36415 COLL VENOUS BLD VENIPUNCTURE: CPT

## 2025-05-27 PROCEDURE — 87449 NOS EACH ORGANISM AG IA: CPT

## 2025-05-27 PROCEDURE — 97166 OT EVAL MOD COMPLEX 45 MIN: CPT

## 2025-05-27 PROCEDURE — 99222 1ST HOSP IP/OBS MODERATE 55: CPT | Performed by: INTERNAL MEDICINE

## 2025-05-27 PROCEDURE — 2580000003 HC RX 258: Performed by: INTERNAL MEDICINE

## 2025-05-27 PROCEDURE — 2500000003 HC RX 250 WO HCPCS: Performed by: INTERNAL MEDICINE

## 2025-05-27 PROCEDURE — 97161 PT EVAL LOW COMPLEX 20 MIN: CPT

## 2025-05-27 PROCEDURE — 6370000000 HC RX 637 (ALT 250 FOR IP): Performed by: PAIN MEDICINE

## 2025-05-27 PROCEDURE — 83605 ASSAY OF LACTIC ACID: CPT

## 2025-05-27 PROCEDURE — 6360000002 HC RX W HCPCS: Performed by: INTERNAL MEDICINE

## 2025-05-27 PROCEDURE — 1210000000 HC MED SURG R&B

## 2025-05-27 PROCEDURE — 87324 CLOSTRIDIUM AG IA: CPT

## 2025-05-27 PROCEDURE — 80053 COMPREHEN METABOLIC PANEL: CPT

## 2025-05-27 PROCEDURE — 87507 IADNA-DNA/RNA PROBE TQ 12-25: CPT

## 2025-05-27 RX ORDER — POTASSIUM CHLORIDE 1500 MG/1
40 TABLET, EXTENDED RELEASE ORAL PRN
Status: DISCONTINUED | OUTPATIENT
Start: 2025-05-27 | End: 2025-05-28 | Stop reason: HOSPADM

## 2025-05-27 RX ORDER — INSULIN LISPRO 100 [IU]/ML
0-8 INJECTION, SOLUTION INTRAVENOUS; SUBCUTANEOUS
Status: DISCONTINUED | OUTPATIENT
Start: 2025-05-27 | End: 2025-05-28 | Stop reason: HOSPADM

## 2025-05-27 RX ORDER — ONDANSETRON 4 MG/1
4 TABLET, ORALLY DISINTEGRATING ORAL EVERY 8 HOURS PRN
Status: DISCONTINUED | OUTPATIENT
Start: 2025-05-27 | End: 2025-05-28 | Stop reason: HOSPADM

## 2025-05-27 RX ORDER — KETOROLAC TROMETHAMINE 30 MG/ML
30 INJECTION, SOLUTION INTRAMUSCULAR; INTRAVENOUS EVERY 6 HOURS PRN
Status: DISCONTINUED | OUTPATIENT
Start: 2025-05-27 | End: 2025-05-28 | Stop reason: HOSPADM

## 2025-05-27 RX ORDER — SODIUM CHLORIDE 0.9 % (FLUSH) 0.9 %
5-40 SYRINGE (ML) INJECTION EVERY 12 HOURS SCHEDULED
Status: DISCONTINUED | OUTPATIENT
Start: 2025-05-27 | End: 2025-05-28 | Stop reason: HOSPADM

## 2025-05-27 RX ORDER — MAGNESIUM SULFATE IN WATER 40 MG/ML
2000 INJECTION, SOLUTION INTRAVENOUS PRN
Status: DISCONTINUED | OUTPATIENT
Start: 2025-05-27 | End: 2025-05-28 | Stop reason: HOSPADM

## 2025-05-27 RX ORDER — LISINOPRIL AND HYDROCHLOROTHIAZIDE 20; 25 MG/1; MG/1
1 TABLET ORAL DAILY
Status: DISCONTINUED | OUTPATIENT
Start: 2025-05-27 | End: 2025-05-28 | Stop reason: HOSPADM

## 2025-05-27 RX ORDER — GABAPENTIN 300 MG/1
600 CAPSULE ORAL 3 TIMES DAILY
Status: DISCONTINUED | OUTPATIENT
Start: 2025-05-27 | End: 2025-05-28 | Stop reason: HOSPADM

## 2025-05-27 RX ORDER — SODIUM CHLORIDE 0.9 % (FLUSH) 0.9 %
5-40 SYRINGE (ML) INJECTION PRN
Status: DISCONTINUED | OUTPATIENT
Start: 2025-05-27 | End: 2025-05-28 | Stop reason: HOSPADM

## 2025-05-27 RX ORDER — ACETAMINOPHEN 650 MG/1
650 SUPPOSITORY RECTAL EVERY 6 HOURS PRN
Status: DISCONTINUED | OUTPATIENT
Start: 2025-05-27 | End: 2025-05-27

## 2025-05-27 RX ORDER — SODIUM CHLORIDE 9 MG/ML
INJECTION, SOLUTION INTRAVENOUS CONTINUOUS
Status: DISCONTINUED | OUTPATIENT
Start: 2025-05-27 | End: 2025-05-28

## 2025-05-27 RX ORDER — ONDANSETRON 2 MG/ML
4 INJECTION INTRAMUSCULAR; INTRAVENOUS EVERY 6 HOURS PRN
Status: DISCONTINUED | OUTPATIENT
Start: 2025-05-27 | End: 2025-05-28 | Stop reason: HOSPADM

## 2025-05-27 RX ORDER — LACTOBACILLUS RHAMNOSUS GG 10B CELL
1 CAPSULE ORAL 2 TIMES DAILY
Status: DISCONTINUED | OUTPATIENT
Start: 2025-05-27 | End: 2025-05-28 | Stop reason: HOSPADM

## 2025-05-27 RX ORDER — ENOXAPARIN SODIUM 100 MG/ML
40 INJECTION SUBCUTANEOUS DAILY
Status: DISCONTINUED | OUTPATIENT
Start: 2025-05-27 | End: 2025-05-28 | Stop reason: HOSPADM

## 2025-05-27 RX ORDER — POLYETHYLENE GLYCOL 3350 17 G/17G
17 POWDER, FOR SOLUTION ORAL DAILY PRN
Status: DISCONTINUED | OUTPATIENT
Start: 2025-05-27 | End: 2025-05-28 | Stop reason: HOSPADM

## 2025-05-27 RX ORDER — LIDOCAINE 4 G/G
1 PATCH TOPICAL DAILY
Status: DISCONTINUED | OUTPATIENT
Start: 2025-05-27 | End: 2025-05-28 | Stop reason: HOSPADM

## 2025-05-27 RX ORDER — GLUCAGON 1 MG/ML
1 KIT INJECTION PRN
Status: DISCONTINUED | OUTPATIENT
Start: 2025-05-27 | End: 2025-05-28 | Stop reason: HOSPADM

## 2025-05-27 RX ORDER — INSULIN GLARGINE 100 [IU]/ML
30 INJECTION, SOLUTION SUBCUTANEOUS NIGHTLY
Status: DISCONTINUED | OUTPATIENT
Start: 2025-05-27 | End: 2025-05-28 | Stop reason: HOSPADM

## 2025-05-27 RX ORDER — SODIUM CHLORIDE 9 MG/ML
INJECTION, SOLUTION INTRAVENOUS PRN
Status: DISCONTINUED | OUTPATIENT
Start: 2025-05-27 | End: 2025-05-28 | Stop reason: HOSPADM

## 2025-05-27 RX ORDER — ACETAMINOPHEN 325 MG/1
650 TABLET ORAL EVERY 6 HOURS PRN
Status: DISCONTINUED | OUTPATIENT
Start: 2025-05-27 | End: 2025-05-27

## 2025-05-27 RX ORDER — OXYCODONE AND ACETAMINOPHEN 5; 325 MG/1; MG/1
1 TABLET ORAL EVERY 4 HOURS PRN
Refills: 0 | Status: DISCONTINUED | OUTPATIENT
Start: 2025-05-27 | End: 2025-05-28 | Stop reason: HOSPADM

## 2025-05-27 RX ORDER — DEXTROSE MONOHYDRATE 100 MG/ML
INJECTION, SOLUTION INTRAVENOUS CONTINUOUS PRN
Status: DISCONTINUED | OUTPATIENT
Start: 2025-05-27 | End: 2025-05-28 | Stop reason: HOSPADM

## 2025-05-27 RX ORDER — POTASSIUM CHLORIDE 7.45 MG/ML
10 INJECTION INTRAVENOUS PRN
Status: DISCONTINUED | OUTPATIENT
Start: 2025-05-27 | End: 2025-05-28 | Stop reason: HOSPADM

## 2025-05-27 RX ORDER — POTASSIUM CHLORIDE 1500 MG/1
40 TABLET, EXTENDED RELEASE ORAL ONCE
Status: COMPLETED | OUTPATIENT
Start: 2025-05-27 | End: 2025-05-27

## 2025-05-27 RX ORDER — L. ACIDOPHILUS/L.BULGARICUS 1MM CELL
1 TABLET ORAL 2 TIMES DAILY
Status: DISCONTINUED | OUTPATIENT
Start: 2025-05-27 | End: 2025-05-27 | Stop reason: CLARIF

## 2025-05-27 RX ORDER — HYDRALAZINE HYDROCHLORIDE 20 MG/ML
10 INJECTION INTRAMUSCULAR; INTRAVENOUS EVERY 4 HOURS PRN
Status: DISCONTINUED | OUTPATIENT
Start: 2025-05-27 | End: 2025-05-28 | Stop reason: HOSPADM

## 2025-05-27 RX ADMIN — KETOROLAC TROMETHAMINE 30 MG: 30 INJECTION, SOLUTION INTRAMUSCULAR at 19:54

## 2025-05-27 RX ADMIN — LISINOPRIL AND HYDROCHLOROTHIAZIDE 1 TABLET: 25; 20 TABLET ORAL at 10:09

## 2025-05-27 RX ADMIN — SODIUM CHLORIDE, PRESERVATIVE FREE 10 ML: 5 INJECTION INTRAVENOUS at 10:10

## 2025-05-27 RX ADMIN — HYDRALAZINE HYDROCHLORIDE 10 MG: 20 INJECTION INTRAMUSCULAR; INTRAVENOUS at 10:29

## 2025-05-27 RX ADMIN — GABAPENTIN 600 MG: 300 CAPSULE ORAL at 19:54

## 2025-05-27 RX ADMIN — INSULIN LISPRO 2 UNITS: 100 INJECTION, SOLUTION INTRAVENOUS; SUBCUTANEOUS at 20:12

## 2025-05-27 RX ADMIN — INSULIN LISPRO 2 UNITS: 100 INJECTION, SOLUTION INTRAVENOUS; SUBCUTANEOUS at 12:49

## 2025-05-27 RX ADMIN — ENOXAPARIN SODIUM 40 MG: 100 INJECTION SUBCUTANEOUS at 10:09

## 2025-05-27 RX ADMIN — WATER 2000 MG: 1 INJECTION INTRAMUSCULAR; INTRAVENOUS; SUBCUTANEOUS at 19:53

## 2025-05-27 RX ADMIN — SODIUM CHLORIDE: 0.9 INJECTION, SOLUTION INTRAVENOUS at 20:15

## 2025-05-27 RX ADMIN — SODIUM CHLORIDE, PRESERVATIVE FREE 10 ML: 5 INJECTION INTRAVENOUS at 19:55

## 2025-05-27 RX ADMIN — SODIUM CHLORIDE: 0.9 INJECTION, SOLUTION INTRAVENOUS at 12:44

## 2025-05-27 RX ADMIN — INSULIN GLARGINE 30 UNITS: 100 INJECTION, SOLUTION SUBCUTANEOUS at 20:12

## 2025-05-27 RX ADMIN — GABAPENTIN 600 MG: 300 CAPSULE ORAL at 14:44

## 2025-05-27 RX ADMIN — POTASSIUM CHLORIDE 40 MEQ: 1500 TABLET, EXTENDED RELEASE ORAL at 02:18

## 2025-05-27 RX ADMIN — HYDRALAZINE HYDROCHLORIDE 10 MG: 20 INJECTION INTRAMUSCULAR; INTRAVENOUS at 19:54

## 2025-05-27 RX ADMIN — Medication 3 MG: at 02:18

## 2025-05-27 RX ADMIN — GABAPENTIN 600 MG: 300 CAPSULE ORAL at 10:09

## 2025-05-27 RX ADMIN — SODIUM CHLORIDE: 0.9 INJECTION, SOLUTION INTRAVENOUS at 02:18

## 2025-05-27 RX ADMIN — OXYCODONE HYDROCHLORIDE AND ACETAMINOPHEN 1 TABLET: 5; 325 TABLET ORAL at 14:44

## 2025-05-27 RX ADMIN — Medication 1 CAPSULE: at 19:55

## 2025-05-27 ASSESSMENT — ENCOUNTER SYMPTOMS
DIARRHEA: 1
CHEST TIGHTNESS: 1
SHORTNESS OF BREATH: 1
COUGH: 1
VOMITING: 1

## 2025-05-27 ASSESSMENT — PAIN DESCRIPTION - DESCRIPTORS
DESCRIPTORS: ACHING
DESCRIPTORS: SORE
DESCRIPTORS: ACHING

## 2025-05-27 ASSESSMENT — PAIN DESCRIPTION - PAIN TYPE: TYPE: ACUTE PAIN

## 2025-05-27 ASSESSMENT — PAIN DESCRIPTION - LOCATION
LOCATION: COCCYX

## 2025-05-27 ASSESSMENT — PAIN DESCRIPTION - ORIENTATION: ORIENTATION: MID

## 2025-05-27 ASSESSMENT — PAIN SCALES - GENERAL
PAINLEVEL_OUTOF10: 8
PAINLEVEL_OUTOF10: 9
PAINLEVEL_OUTOF10: 6
PAINLEVEL_OUTOF10: 10
PAINLEVEL_OUTOF10: 3

## 2025-05-27 ASSESSMENT — PAIN DESCRIPTION - FREQUENCY: FREQUENCY: CONTINUOUS

## 2025-05-27 ASSESSMENT — PAIN DESCRIPTION - ONSET: ONSET: ON-GOING

## 2025-05-27 NOTE — PLAN OF CARE
Ongoing    Problem: Pain  Goal: Verbalizes/displays adequate comfort level or baseline comfort level  5/27/2025 1052 by Doreen Middleton RN  Outcome: Progressing  5/27/2025 0204 by Haja Sheffield RN  Outcome: Progressing     Problem: Safety - Adult  Goal: Free from fall injury  5/27/2025 1052 by Doreen Middleton, RN  Outcome: Progressing  5/27/2025 0204 by Haja Sheffield RN  Outcome: Progressing

## 2025-05-27 NOTE — ACP (ADVANCE CARE PLANNING)
Advance Care Planning   Healthcare Decision Maker:    Primary Decision Maker: Nury Jj - [facility] - [phone]    Click here to complete Healthcare Decision Makers including selection of the Healthcare Decision Maker Relationship (ie \"Primary\").  Today we documented Decision Maker(s) consistent with Legal Next of Kin hierarchy.

## 2025-05-27 NOTE — CARE COORDINATION
Case Management Assessment  Initial Evaluation    Date/Time of Evaluation: 5/27/2025 1:30 PM  Assessment Completed by: LEXI Napier    If patient is discharged prior to next notation, then this note serves as note for discharge by case management.    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 Yes    Chart Reviewed: Yes      History Provided by: Patient  Patient Orientation: Alert and Oriented    Patient Cognition: Alert    Hospitalization in the last 30 days (Readmission):  No    If yes, Readmission Assessment in CM Navigator will be completed.    Advance Directives:      Code Status: Full Code   Patient's Primary Decision Maker is: Legal Next of Kin      Discharge Planning:    Patient lives with: Spouse/Significant Other, Children Type of Home: House  Primary Care Giver: Self  Patient Support Systems include: Spouse/Significant Other, Children   Current Financial resources: Medicaid  Current community resources: None  Current services prior to admission: None            Current DME:              Type of Home Care services:  None    ADLS  Prior functional level: Independent in ADLs/IADLs  Current functional level: Assistance with the following:, Other (see comment) (PT/OT meche pending)    PT AM-PAC:   /24  OT AM-PAC:   /24    Family can provide assistance at DC: Yes  Would you like Case Management to discuss the discharge plan with any other family members/significant others, and if so, who? No  Plans to Return to

## 2025-05-27 NOTE — CONSULTS
Infectious Diseases Inpatient Consult Note      Reason for Consult:   Foot osteomyelitis  Requesting Physician:   Dr. Mcfarlane  Primary Care Physician:  Geovanni Ford, APRN - CNP  History Obtained From:   Pt, EPIC    Admit Date: 5/26/2025  Hospital Day: 2      HISTORY OF PRESENT ILLNESS:  This is a 59 y.o. male with past medical history of hypertension, right great toe osteomyelitis with amputation in 2024, diabetes mellitus 2 with peripheral neuropathy, history of knee replacement, chronic cigarette smoking, left hand cellulitis with abscess and methicillin sensitive Staph aureus, was admitted to [facility]  from home through ER with 5 days history of not feeling well with chest tightness, productive cough of white phlegm, lightheadedness and dizziness with frequent falls and passing out.  Patient reported that he stopped smoking 3 days ago because of chest tightness.  Patient has been receiving Keflex 500 mg 4 times daily after he developed an ulceration in March at the bottom of right foot that was draining for which he required debridement on March 20 with positive culture for methicillin sensitive Staph aureus and group G strep.  Patient's right 2nd and 3rd toes became deformed and deviated since he had his first toe amputated.  MRI of right foot done on April 29 was consistent with osteomyelitis involving the second metatarsal, proximal phalanx and proximal half of middle phalanx.  Extensive hammertoe deformities of 2nd and 3rd toes.  Patient currently has extensive wounds of right elbow and right back related to his falls.   Extensive workup on admission included CTA of the chest, abdomen pelvis, head.   With positive fluid within the proximal colon seen in setting of diarrhea, mild thickening of distal esophageal wall  New moderate acute on chronic left maxillary sinusitis  Occlusion of right subclavian vein with multiple venous collaterals in the neck and paraspinous region  No acute pulmonary embolus.  Positive 
17 g, Oral, Daily PRN  acetaminophen (TYLENOL) tablet 650 mg, 650 mg, Oral, Q6H PRN **OR** acetaminophen (TYLENOL) suppository 650 mg, 650 mg, Rectal, Q6H PRN  glucose chewable tablet 16 g, 4 tablet, Oral, PRN  dextrose bolus 10% 125 mL, 125 mL, IntraVENous, PRN **OR** dextrose bolus 10% 250 mL, 250 mL, IntraVENous, PRN  glucagon injection 1 mg, 1 mg, SubCUTAneous, PRN  dextrose 10 % infusion, , IntraVENous, Continuous PRN  lidocaine 4 % external patch 1 patch, 1 patch, TransDERmal, Daily  ketorolac (TORADOL) injection 30 mg, 30 mg, IntraVENous, Q6H PRN  ROS:  Constitutional:  Negative for fever.        Frequent falls   HENT:  Negative for congestion.    Eyes:  Negative for visual disturbance.   Respiratory:  Negative for shortness of breath.    Cardiovascular:  Negative for chest pain.   Gastrointestinal:  Positive for diarrhea, nausea and vomiting. Negative for abdominal pain.   Genitourinary:  Negative for dysuria and flank pain.   Musculoskeletal:  Positive for arthralgias and myalgias. Negative for back pain and neck pain.   Neurological:  Positive for dizziness (Episodic), syncope and light-headedness (Episodic). Negative for seizures, facial asymmetry, speech difficulty and headaches.   Psychiatric/Behavioral:  Negative for confusion.    All other systems reviewed and are negative.    Physical  VITALS:  BP (!) 188/70   Pulse 89   Temp 98.1 °F (36.7 °C) (Oral)   Resp 18   Ht 1.803 m (5' 11\")   Wt 77.1 kg (170 lb)   SpO2 99%   BMI 23.71 kg/m²   CONSTITUTIONAL:  awake, alert, cooperative, no apparent distress, and appears stated age  EYES:  Lids and lashes normal, pupils equal, round and reactive to light, extra ocular muscles intact, sclera clear, conjunctiva normal  ENT:  Normocephalic, without obvious abnormality, atraumatic, sinuses nontender on palpation, external ears without lesions, oral pharynx with moist mucus membranes, tonsils without erythema or exudates, gums normal and good 
pain  PSY: No concerns regarding depression, anxiety  INTEGUMENTARY: Hyperkeratotic tissue noted to bilateral feet    OBJECTIVE:  BP (!) 188/70   Pulse 89   Temp 98.1 °F (36.7 °C) (Oral)   Resp 18   Ht 1.803 m (5' 11\")   Wt 77.1 kg (170 lb)   SpO2 99%   BMI 23.71 kg/m²   Patient is alert and oriented to person, place, and time    Bilateral Lower Extremity Physical Exam:    VASCULAR: DP and PT pulses are palpable on exam bilaterally. Edema is noted to the right foot.    NEUROLOGICAL: Light touch present to B/L foot and ankle. Protective sensation absent to all sites b/l as tested by 5.07 SWMF.     DERMATOLOGICAL: Remote history of partial ray amputation on the right foot. Significant xerosis noted to the right foot. The wound on the bottom of the right foot is completely healed today with a hyperkeratotic lesion.    MUSCULOSKELETAL: Muscle strength +5/5 for all pedal muscle groups including dorsiflexors, plantarflexors, everters, and inverters B/L. Remote history of partial ray amputation of the great toe on the right foot. There are deviation of digits 2 and 3 medially. There is no open ulcer today. No signs of infection.  Hyperkeratotic tissue noted to the distal aspect of the left second digit.  No underlying wound or concern for deep infection at this time.     LABS:   Lab Results   Component Value Date    WBC 16.5 (H) 05/27/2025    HGB 14.5 05/27/2025    HCT 41.4 (L) 05/27/2025    MCV 84.1 05/27/2025     05/27/2025     Lab Results   Component Value Date/Time     05/27/2025 06:41 AM    K 3.6 05/27/2025 06:41 AM     05/27/2025 06:41 AM    CO2 20 05/27/2025 06:41 AM    BUN 21 05/27/2025 06:41 AM    CREATININE 0.95 05/27/2025 06:41 AM    GLUCOSE 183 05/27/2025 06:41 AM    CALCIUM 8.3 05/27/2025 06:41 AM      No results found for: \"LABPROT\", \"LABALBU\"  Lab Results   Component Value Date    SEDRATE 4 11/27/2023     Lab Results   Component Value Date    CRP 49.5 (H) 11/27/2023     Lab Results

## 2025-05-27 NOTE — H&P
Hospitalist Group   History and Physical      CHIEF COMPLAINT: Syncope, dizziness and frequent falls    History of Present Illness:  59 y.o. male with a history of hypertension, osteomyelitis, diabetes presents with syncope, dizziness, frequent falls.  For the past week patient has been having multiple falls.  He says that it usually happens after standing up from a seated position and walking for out of bed and then passing out.  He denies any dizziness.  He has been having nausea, vomiting, diarrhea.  He has not been eating or drinking much because of the vomiting.  He has not been taking his medication as well.  He denied chest pain, shortness of breath but is been having a cough that is nonproductive.  Denies any fever at home.  He has not been urinating much since this started.  Patient is following up with podiatry and ID for right foot osteomyelitis.  He requested to be seen by ID because he has an appointment tomorrow for his foot.  Patient said that due to his symptoms he was offered cocaine and he does not know that it was cocaine.    REVIEW OF SYSTEMS:  no fevers, chills, cp, sob, has n/v, no ha, vision/hearing changes, wt changes, hot/cold flashes, other open skin lesions, has diarrhea, no constipation, dysuria/hematuria unless noted in HPI. Complete ROS performed with the patient and is otherwise negative.      PMH:  Past Medical History:   Diagnosis Date    Hypertension     Osteoarthritis        Surgical History:  Past Surgical History:   Procedure Laterality Date    HAND SURGERY Left 11/29/2023    HAND INCISION AND DRAINAGE performed by Debbie Walker MD at [facility] OR    JOINT REPLACEMENT      KNEE SURGERY Left 06/09/2015       Medications Prior to Admission:    Prior to Admission medications    Medication Sig Start Date End Date Taking? Authorizing Provider   gabapentin (NEURONTIN) 300 MG capsule Take 2 capsules by mouth 3 times daily for 20 days. Start at night 4/22/25 5/12/25  Geovanni Ford

## 2025-05-28 VITALS
WEIGHT: 170 LBS | OXYGEN SATURATION: 94 % | DIASTOLIC BLOOD PRESSURE: 83 MMHG | HEART RATE: 103 BPM | SYSTOLIC BLOOD PRESSURE: 159 MMHG | BODY MASS INDEX: 23.8 KG/M2 | TEMPERATURE: 98.4 F | RESPIRATION RATE: 18 BRPM | HEIGHT: 71 IN

## 2025-05-28 PROBLEM — M86.371 CHRONIC MULTIFOCAL OSTEOMYELITIS, RIGHT ANKLE AND FOOT (HCC): Status: ACTIVE | Noted: 2025-05-28

## 2025-05-28 LAB
ADV 40+41 DNA STL QL NAA+NON-PROBE: NOT DETECTED
ALBUMIN SERPL-MCNC: 3.5 G/DL (ref 3.5–4.6)
ALP SERPL-CCNC: 91 U/L (ref 35–104)
ALT SERPL-CCNC: 8 U/L (ref 0–41)
ANION GAP SERPL CALCULATED.3IONS-SCNC: 14 MEQ/L (ref 9–15)
AST SERPL-CCNC: 13 U/L (ref 0–40)
BASOPHILS # BLD: 0.1 K/UL (ref 0–0.2)
BASOPHILS NFR BLD: 0.5 %
BILIRUB SERPL-MCNC: 0.6 MG/DL (ref 0.2–0.7)
BUN SERPL-MCNC: 13 MG/DL (ref 8–23)
C CAYETANENSIS DNA STL QL NAA+NON-PROBE: NOT DETECTED
C COLI+JEJ+UPSA DNA STL QL NAA+NON-PROBE: NOT DETECTED
C DIFF TOX A+B STL QL IA: NORMAL
CALCIUM SERPL-MCNC: 8.2 MG/DL (ref 8.5–9.9)
CHLORIDE SERPL-SCNC: 106 MEQ/L (ref 95–107)
CO2 SERPL-SCNC: 20 MEQ/L (ref 20–31)
CREAT SERPL-MCNC: 0.79 MG/DL (ref 0.7–1.2)
CRYPTOSP DNA STL QL NAA+NON-PROBE: NOT DETECTED
E HISTOLYT DNA STL QL NAA+NON-PROBE: NOT DETECTED
EAEC PAA PLAS AGGR+AATA ST NAA+NON-PRB: NOT DETECTED
EC STX1+STX2 GENES STL QL NAA+NON-PROBE: NOT DETECTED
EKG ATRIAL RATE: 94 BPM
EKG DIAGNOSIS: NORMAL
EKG P AXIS: 83 DEGREES
EKG P-R INTERVAL: 146 MS
EKG Q-T INTERVAL: 328 MS
EKG QRS DURATION: 70 MS
EKG QTC CALCULATION (BAZETT): 410 MS
EKG R AXIS: -7 DEGREES
EKG T AXIS: -9 DEGREES
EKG VENTRICULAR RATE: 94 BPM
EOSINOPHIL # BLD: 0.1 K/UL (ref 0–0.7)
EOSINOPHIL NFR BLD: 0.4 %
EPEC EAE GENE STL QL NAA+NON-PROBE: NOT DETECTED
ERYTHROCYTE [DISTWIDTH] IN BLOOD BY AUTOMATED COUNT: 13.7 % (ref 11.5–14.5)
ETEC LTA+ST1A+ST1B TOX ST NAA+NON-PROBE: NOT DETECTED
G LAMBLIA DNA STL QL NAA+NON-PROBE: NOT DETECTED
GI PATH DNA+RNA PNL STL NAA+NON-PROBE: NOT DETECTED
GLOBULIN SER CALC-MCNC: 3.5 G/DL (ref 2.3–3.5)
GLUCOSE BLD-MCNC: 168 MG/DL (ref 70–99)
GLUCOSE BLD-MCNC: 172 MG/DL (ref 70–99)
GLUCOSE BLD-MCNC: 203 MG/DL (ref 70–99)
GLUCOSE SERPL-MCNC: 156 MG/DL (ref 70–99)
HCT VFR BLD AUTO: 40.8 % (ref 42–52)
HGB BLD-MCNC: 14.4 G/DL (ref 14–18)
LYMPHOCYTES # BLD: 3.5 K/UL (ref 1–4.8)
LYMPHOCYTES NFR BLD: 20.8 %
MAGNESIUM SERPL-MCNC: 1.9 MG/DL (ref 1.7–2.4)
MCH RBC QN AUTO: 29.8 PG (ref 27–31.3)
MCHC RBC AUTO-ENTMCNC: 35.3 % (ref 33–37)
MCV RBC AUTO: 84.5 FL (ref 79–92.2)
MONOCYTES # BLD: 1.3 K/UL (ref 0.2–0.8)
MONOCYTES NFR BLD: 7.6 %
NEUTROPHILS # BLD: 11.6 K/UL (ref 1.4–6.5)
NEUTS SEG NFR BLD: 70.1 %
NOROVIRUS GI+II RNA STL QL NAA+NON-PROBE: NOT DETECTED
P SHIGELLOIDES DNA STL QL NAA+NON-PROBE: NOT DETECTED
PERFORMED ON: ABNORMAL
PLATELET # BLD AUTO: 284 K/UL (ref 130–400)
POTASSIUM SERPL-SCNC: 2.8 MEQ/L (ref 3.4–4.9)
PROT SERPL-MCNC: 7 G/DL (ref 6.3–8)
RBC # BLD AUTO: 4.83 M/UL (ref 4.7–6.1)
RVA RNA STL QL NAA+NON-PROBE: NOT DETECTED
S ENT+BONG DNA STL QL NAA+NON-PROBE: NOT DETECTED
SAPO I+II+IV+V RNA STL QL NAA+NON-PROBE: NOT DETECTED
SHIGELLA SP+EIEC IPAH ST NAA+NON-PROBE: NOT DETECTED
SODIUM SERPL-SCNC: 140 MEQ/L (ref 135–144)
V CHOL+PARA+VUL DNA STL QL NAA+NON-PROBE: NOT DETECTED
V CHOLERAE DNA STL QL NAA+NON-PROBE: NOT DETECTED
WBC # BLD AUTO: 16.6 K/UL (ref 4.8–10.8)
Y ENTEROCOL DNA STL QL NAA+NON-PROBE: NOT DETECTED

## 2025-05-28 PROCEDURE — 6370000000 HC RX 637 (ALT 250 FOR IP): Performed by: INTERNAL MEDICINE

## 2025-05-28 PROCEDURE — 97116 GAIT TRAINING THERAPY: CPT

## 2025-05-28 PROCEDURE — 6370000000 HC RX 637 (ALT 250 FOR IP): Performed by: PAIN MEDICINE

## 2025-05-28 PROCEDURE — 99231 SBSQ HOSP IP/OBS SF/LOW 25: CPT | Performed by: PODIATRIST

## 2025-05-28 PROCEDURE — 93010 ELECTROCARDIOGRAM REPORT: CPT | Performed by: INTERNAL MEDICINE

## 2025-05-28 PROCEDURE — 83735 ASSAY OF MAGNESIUM: CPT

## 2025-05-28 PROCEDURE — 36415 COLL VENOUS BLD VENIPUNCTURE: CPT

## 2025-05-28 PROCEDURE — 99232 SBSQ HOSP IP/OBS MODERATE 35: CPT | Performed by: INTERNAL MEDICINE

## 2025-05-28 PROCEDURE — 85025 COMPLETE CBC W/AUTO DIFF WBC: CPT

## 2025-05-28 PROCEDURE — 6360000002 HC RX W HCPCS: Performed by: INTERNAL MEDICINE

## 2025-05-28 PROCEDURE — 80053 COMPREHEN METABOLIC PANEL: CPT

## 2025-05-28 RX ORDER — LIDOCAINE 4 G/G
1 PATCH TOPICAL DAILY
Qty: 30 EACH | Refills: 0 | Status: SHIPPED | OUTPATIENT
Start: 2025-05-29

## 2025-05-28 RX ORDER — POTASSIUM CHLORIDE 1500 MG/1
40 TABLET, EXTENDED RELEASE ORAL 2 TIMES DAILY
Status: DISCONTINUED | OUTPATIENT
Start: 2025-05-28 | End: 2025-05-28 | Stop reason: HOSPADM

## 2025-05-28 RX ORDER — AMLODIPINE BESYLATE 10 MG/1
10 TABLET ORAL DAILY
Qty: 30 TABLET | Refills: 3 | Status: SHIPPED | OUTPATIENT
Start: 2025-05-29

## 2025-05-28 RX ORDER — LACTOBACILLUS RHAMNOSUS GG 10B CELL
1 CAPSULE ORAL 2 TIMES DAILY
Qty: 60 CAPSULE | Refills: 0 | Status: SHIPPED | OUTPATIENT
Start: 2025-05-28

## 2025-05-28 RX ORDER — AMLODIPINE BESYLATE 10 MG/1
10 TABLET ORAL DAILY
Status: DISCONTINUED | OUTPATIENT
Start: 2025-05-28 | End: 2025-05-28 | Stop reason: HOSPADM

## 2025-05-28 RX ORDER — CEPHALEXIN 500 MG/1
500 CAPSULE ORAL 3 TIMES DAILY
Qty: 30 CAPSULE | Refills: 0 | Status: SHIPPED | OUTPATIENT
Start: 2025-05-28 | End: 2025-06-07

## 2025-05-28 RX ADMIN — HYDRALAZINE HYDROCHLORIDE 10 MG: 20 INJECTION INTRAMUSCULAR; INTRAVENOUS at 03:14

## 2025-05-28 RX ADMIN — ENOXAPARIN SODIUM 40 MG: 100 INJECTION SUBCUTANEOUS at 08:34

## 2025-05-28 RX ADMIN — LISINOPRIL AND HYDROCHLOROTHIAZIDE 1 TABLET: 25; 20 TABLET ORAL at 08:34

## 2025-05-28 RX ADMIN — HYDRALAZINE HYDROCHLORIDE 10 MG: 20 INJECTION INTRAMUSCULAR; INTRAVENOUS at 11:06

## 2025-05-28 RX ADMIN — KETOROLAC TROMETHAMINE 30 MG: 30 INJECTION, SOLUTION INTRAMUSCULAR at 11:06

## 2025-05-28 RX ADMIN — AMLODIPINE BESYLATE 10 MG: 10 TABLET ORAL at 08:33

## 2025-05-28 RX ADMIN — GABAPENTIN 600 MG: 300 CAPSULE ORAL at 14:35

## 2025-05-28 RX ADMIN — GABAPENTIN 600 MG: 300 CAPSULE ORAL at 08:34

## 2025-05-28 RX ADMIN — POTASSIUM CHLORIDE 10 MEQ: 7.46 INJECTION, SOLUTION INTRAVENOUS at 08:39

## 2025-05-28 RX ADMIN — POTASSIUM CHLORIDE 10 MEQ: 7.46 INJECTION, SOLUTION INTRAVENOUS at 10:02

## 2025-05-28 RX ADMIN — OXYCODONE HYDROCHLORIDE AND ACETAMINOPHEN 1 TABLET: 5; 325 TABLET ORAL at 08:33

## 2025-05-28 RX ADMIN — OXYCODONE HYDROCHLORIDE AND ACETAMINOPHEN 1 TABLET: 5; 325 TABLET ORAL at 00:02

## 2025-05-28 RX ADMIN — OXYCODONE HYDROCHLORIDE AND ACETAMINOPHEN 1 TABLET: 5; 325 TABLET ORAL at 14:35

## 2025-05-28 RX ADMIN — POTASSIUM CHLORIDE 40 MEQ: 1500 TABLET, EXTENDED RELEASE ORAL at 11:06

## 2025-05-28 RX ADMIN — Medication 1 CAPSULE: at 08:34

## 2025-05-28 RX ADMIN — KETOROLAC TROMETHAMINE 30 MG: 30 INJECTION, SOLUTION INTRAMUSCULAR at 03:14

## 2025-05-28 ASSESSMENT — PAIN DESCRIPTION - ORIENTATION
ORIENTATION: MID

## 2025-05-28 ASSESSMENT — PAIN SCALES - GENERAL
PAINLEVEL_OUTOF10: 8
PAINLEVEL_OUTOF10: 8
PAINLEVEL_OUTOF10: 4
PAINLEVEL_OUTOF10: 7
PAINLEVEL_OUTOF10: 6
PAINLEVEL_OUTOF10: 7
PAINLEVEL_OUTOF10: 3

## 2025-05-28 ASSESSMENT — PAIN DESCRIPTION - DESCRIPTORS
DESCRIPTORS: ACHING

## 2025-05-28 ASSESSMENT — PAIN DESCRIPTION - LOCATION
LOCATION: COCCYX

## 2025-05-28 NOTE — PLAN OF CARE
Nutrition Problem #1: Increased nutrient needs  Intervention: Food and/or Nutrient Delivery: Modify Current Diet, Start Oral Nutrition Supplement (Carb Control 5 diet  Begin a wound healing oral supplement BID)

## 2025-05-28 NOTE — PLAN OF CARE
Problem: Chronic Conditions and Co-morbidities  Goal: Patient's chronic conditions and co-morbidity symptoms are monitored and maintained or improved  Outcome: Progressing     Problem: Discharge Planning  Goal: Discharge to home or other facility with appropriate resources  Outcome: Progressing     Problem: Pain  Goal: Verbalizes/displays adequate comfort level or baseline comfort level  5/27/2025 2206 by Haja Sheffield RN  Outcome: Progressing  5/27/2025 1052 by Doreen Middleton, RN  Outcome: Progressing     Problem: Safety - Adult  Goal: Free from fall injury  5/27/2025 2206 by Haja Sheffield RN  Outcome: Progressing  5/27/2025 1052 by Doreen Middleton, RN  Outcome: Progressing

## 2025-05-28 NOTE — DISCHARGE SUMMARY
CHEST WITH AND WITHOUT CONTRAST 5/26/2025 9:50 pm TECHNIQUE: CTA of the chest was performed before and after the administration of intravenous contrast.  Multiplanar reformatted images are provided for review.  MIP images are provided for review. Automated exposure control, iterative reconstruction, and/or weight based adjustment of the mA/kV was utilized to reduce the radiation dose to as low as reasonably achievable. COMPARISON: 03/22/2021 HISTORY: ORDERING SYSTEM PROVIDED HISTORY: syncope, fall, pe TECHNOLOGIST PROVIDED HISTORY: Reason for exam:->syncope, fall, pe Additional Contrast?->1 What reading provider will be dictating this exam?->CRC FINDINGS: Pulmonary Arteries: Pulmonary arteries are adequately opacified for evaluation.  No evidence of intraluminal filling defect to suggest pulmonary embolism.  Main pulmonary artery is normal in caliber. Mediastinum: No evidence of mediastinal lymphadenopathy.  The heart and pericardium demonstrate no acute abnormality.  There is no acute abnormality of the thoracic aorta. Lungs/pleura: Background emphysematous changes.  The lungs are without acute process.  No focal consolidation or pulmonary edema.  No evidence of pleural effusion or pneumothorax. Upper Abdomen: Please see separately dictated report for findings below the diaphragm. Soft Tissues/Bones: No acute bone or soft tissue abnormality.     1. No acute pulmonary artery embolism. 2. Emphysema.     CTA HEAD W WO CONTRAST  Result Date: 5/26/2025  EXAMINATION: CTA OF THE HEAD WITHOUT AND WITH CONTRAST; CTA OF THE NECK 5/26/2025 8:50 pm: TECHNIQUE: CTA of the head/brain was performed with the administration of intravenous contrast. Multiplanar reformatted images are provided for review.  MIP images are provided for review. CT of the head was performed without the administration of intravenous contrast. CTA of the neck was performed with the administration of intravenous contrast. Multiplanar reformatted images are

## 2025-05-30 NOTE — PROGRESS NOTES
PODIATRIC MEDICINE AND SURGERY  CONSULT HISTORY AND PHYSICAL    Opinion/advice regarding: Right foot OM  Staff Doctor:  Dr. Rodriguez    ASSESSMENT:  59 y.o. male with PMH significant for hypertension, osteomyelitis, diabetes who presented to [facility] emergency department due to complaints of syncope, dizziness and frequent falls.  On admission he was also admitting to nausea, vomiting and diarrhea.  Podiatry was consulted due to right foot wound and recent MRI confirming osteomyelitis in the right second digit and second metatarsal.  Currently, there is no open wound to the right foot.  Discussed conservative versus surgical intervention.  Patient is interested in obtaining suppressive antibiotics from infectious disease and will consider surgical intervention after his current admission.  Patient is okay for discharge from podiatry standpoint and will follow-up in the outpatient setting with Dr. Eason.     PLAN AND RECOMMENDATIONS::  Patient's case to be discussed with staff, Dr. Rodriguez, who will provide final recommendations going forward.    No open wounds on exam today.  Would recommend infectious disease consult to discuss treatment options including suppressive antibiotics due to the osteomyelitis in the right foot.  Will need close monitoring during this time.  Patient is aware that he may need amputation of the second digit and second ray, but given the healing of the wound and no constitutional symptoms, we again discussed no absolute surgery to this.    Weightbearing as tolerated  Elevate bilateral lower extremity at or above heart level while resting  Pain management per primary team   -Patient is okay for discharge from podiatry standpoint.  Will plan to follow-up in the outpatient setting within 1 week with Dr. Eason to discuss surgical intervention to the right foot.    INTERVAL HPI:   - Seen at bedside with Dr. Rodriguez.  -Vital signs stable, although hypertensive at 187/76.  Appears to be 
Comprehensive Nutrition Assessment    Type and Reason for Visit:  Initial, Positive nutrition screen    Nutrition Recommendations/Plan:   Carb Control 5 diet   Begin a wound healing oral supplement BID      Malnutrition Assessment:  Malnutrition Status:  Insufficient data (05/28/25 1407)    Context:  Acute Illness     Findings of the 6 clinical characteristics of malnutrition:  Energy Intake:  50% or less of estimated energy requirements for 5 or more days  Weight Loss:  Unable to assess     Body Fat Loss:  Unable to assess     Muscle Mass Loss:  Unable to assess    Fluid Accumulation:  Unable to assess     Strength:  Not Performed    Nutrition Assessment:    Pt presents with increased nutrient needs for wound healing.  Pt stated his appetite/po intakes have been poor for the past week due to N/V/D, however since admission his intakes have improved and eating > 75% of meals.  Will provide a wound healing oral supplement BID and continue to follow    Nutrition Related Findings:    PMH significant for hypertension, osteomyelitis, diabetes who presented to [facility] emergency department due to complaints of syncope, dizziness and frequent falls.  On admission he was also admitting to nausea, vomiting and diarrhea. Poor po intake 2/2 N/V.  +enteric/contact precautions, labs: hyperglycemia, meds reviewed Wound Type:  (multiple LLE-nectotic tissue, traumatic, I&D hand; osteomyelitis in the right second digit and second metatarsal.  Currently, there is no open wound to the right foot.)       Current Nutrition Intake & Therapies:    Average Meal Intake: %  Average Supplements Intake: None Ordered  ADULT DIET; Regular; 4 carb choices (60 gm/meal)    Anthropometric Measures:  Height: 180.3 cm (5' 11\")  Ideal Body Weight (IBW): 172 lbs (78 kg)    Admission Body Weight: 77.1 kg (170 lb)  Current Body Weight: 77.1 kg (170 lb) (adm wt \"actual\"),     Current BMI (kg/m2): 23.7  Usual Body Weight:  (Pt reports ~ 18 lb-UTD 
Infectious Diseases Inpatient Progress Note          HISTORY OF PRESENT ILLNESS:  Follow up parainfluenza respiratory infection with viral maxillary sinusitis, acute gastroenteritis in a patient on chronic antibiotics with Keflex for multifocal right foot osteomyelitis, admitted with syncopal attacks,  found to have positive drug screen on admission on IV Rocephin, well tolerated.  Patient reports feeling much better today.  He reports resolved dizziness and GI symptoms.  No fevers.  He is anxious to be discharged home today.  Has been having issues with hypotension and tachycardia during this admission  Current Medications:     amLODIPine  10 mg Oral Daily    potassium chloride  40 mEq Oral BID    gabapentin  600 mg Oral TID    insulin glargine  30 Units SubCUTAneous Nightly    lisinopril-hydroCHLOROthiazide  1 tablet Oral Daily    insulin lispro  0-8 Units SubCUTAneous 4x Daily AC & HS    sodium chloride flush  5-40 mL IntraVENous 2 times per day    enoxaparin  40 mg SubCUTAneous Daily    lidocaine  1 patch TransDERmal Daily    cefTRIAXone (ROCEPHIN) IV  2,000 mg IntraVENous Q24H    lactobacillus  1 capsule Oral BID       Allergies:  Patient has no known allergies.      Review of Systems  Resolved lightheadedness and dizziness  Resolved GI symptoms  Denies any pain  Anxious to be discharged home  Reports right antecubital infiltrated IV site that was removed  Denies any shortness of breath or cough  No sore throat or mouth soreness  No dysuria  14 system review is negative other than HPI    Physical Exam  Vitals:    05/28/25 0717 05/28/25 1059 05/28/25 1106 05/28/25 1509   BP: (!) 190/84 (!) 210/81 (!) 187/76 (!) 159/83   Pulse: (!) 103 82  (!) 103   Resp: 18 18  18   Temp: 98.1 °F (36.7 °C) 98.1 °F (36.7 °C)  98.4 °F (36.9 °C)   TempSrc: Oral Oral  Oral   SpO2: 91% 98%  94%   Weight:       Height:         General Appearance: alert and oriented to person, place and time, well-developed and well-nourished, in no 
Iv and tele removed for discharge. Care plan completed for discharge. Appropriate education addressed in discharge instructions. Instructions completed and reviewed with patient. Verbalize understanding of instructions and follow up. Personal belongings gathered. No complaints. Transport wheelchair called. Family member on way  
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]  Frequent falls [R29.6]  Hypertension, unspecified type [I10]   Patient Active Problem List    Diagnosis Date Noted    Parainfluenza 2025    Acute gastroenteritis 2025    Syncope 2025    Frequent falls 2025    Acute osteomyelitis of toe, right (HCC) 2024    Polysubstance abuse (HCC) 2023    Cellulitis of left foot 2023    Abscess of left hand 2023    Abscess of finger, left 2023    Cellulitis of left hand 2023    Type 2 diabetes mellitus with right diabetic foot infection (HCC) 2023    Osteomyelitis (HCC) 2023    Essential hypertension 2018    Chronic pain of left knee 2018    Lumbosacral spondylosis without myelopathy 2016      Dehydration  Frequent falls  Renal insufficiency  Occlusion of right subclavian vein (HCC)  Parainfluenza  Hypertension, unspecified type  Polysubstance abuse (HCC)    Past Medical History:   Diagnosis Date    Hypertension     Osteoarthritis      Past Surgical History:   Procedure Laterality Date    HAND SURGERY Left 2023    HAND INCISION AND DRAINAGE performed by Debbie Walker MD at [facility] OR    JOINT REPLACEMENT      KNEE SURGERY Left 2015        Restrictions  Restrictions/Precautions: Contact Precautions;Fall Risk WBAT right LE per podiatry note.                Safety Devices: Safety Devices  Type of Devices: All fall risk precautions in place;Call light within reach;Left in bed     Patient's date of birth confirmed: Yes    General:       Subjective:\"What ever they are doing is making me feel better.\"      
Patient was seen and examined.  I agree and assessment and plan as detailed in H&P from today.  
Physical Therapy  Facility/Department: [facility] MED SURG W189/W189-01  Physical Therapy Discharge      NAME: Tee Jj    : 1965 (59 y.o.)  MRN: [id number]    Account: [id number]  Gender: male      Patient has been discharged from acute care hospital. DC patient from current PT program.      Electronically signed by Susana Portillo PT on 25 at 4:58 PM EDT    
Physical Therapy  Facility/Department: [facility] MED SURG W189/W189-01  Physical Therapy Discharge      NAME: Tee Jj    : 1965 (59 y.o.)  MRN: [id number]    Account: [id number]  Gender: male      Patient has been discharged from acute care hospital. DC patient from current PT program.      Electronically signed by Susana Portillo PT on 25 at 10:57 AM EDT    
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]  Frequent falls [R29.6]  Hypertension, unspecified type [I10]   Chief Complaint   Patient presents with    Dizziness     N/V/D, hear palpations      Patient Active Problem List    Diagnosis Date Noted    Parainfluenza 2025    Acute gastroenteritis 2025    Syncope 2025    Frequent falls 2025    Acute osteomyelitis of toe, right (HCC) 2024    Polysubstance abuse (HCC) 2023    Cellulitis of left foot 2023    Abscess of left hand 2023    Abscess of finger, left 2023    Cellulitis of left hand 2023    Type 2 diabetes mellitus with right diabetic foot infection (HCC) 2023    Osteomyelitis (HCC) 2023    Essential hypertension 2018    Chronic pain of left knee 2018    Lumbosacral spondylosis without myelopathy 2016        Past Medical History:   Diagnosis Date    Hypertension     Osteoarthritis      Past Surgical History:   Procedure Laterality Date    HAND SURGERY Left 2023    HAND INCISION AND DRAINAGE performed by Debbie Walker MD at [facility] OR    JOINT REPLACEMENT      KNEE SURGERY Left 2015          Restrictions:  Restrictions/Precautions: Contact Precautions;Fall Risk  Position Activity Restriction  Other Position/Activity Restrictions: WBAT per podiatry note    SUBJECTIVE:    Patient initially declines, expressing frustration with painful IV. RN notified, removed IV and patient agreeable to therapy.     Pain   Denies, no objective signs except for mild antalgic rt foot pain which patient doesn't 
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]  Frequent falls [R29.6]  Hypertension, unspecified type [I10]   Chief Complaint   Patient presents with    Dizziness     N/V/D, hear palpations      Patient Active Problem List    Diagnosis Date Noted    Parainfluenza 2025    Acute gastroenteritis 2025    Syncope 2025    Frequent falls 2025    Acute osteomyelitis of toe, right (HCC) 2024    Polysubstance abuse (HCC) 2023    Cellulitis of left foot 2023    Abscess of left hand 2023    Abscess of finger, left 2023    Cellulitis of left hand 2023    Type 2 diabetes mellitus with right diabetic foot infection (HCC) 2023    Osteomyelitis (HCC) 2023    Essential hypertension 2018    Chronic pain of left knee 2018    Lumbosacral spondylosis without myelopathy 2016        Past Medical History:   Diagnosis Date    Hypertension     Osteoarthritis      Past Surgical History:   Procedure Laterality Date    HAND SURGERY Left 2023    HAND INCISION AND DRAINAGE performed by Debbie Walker MD at [facility] OR    JOINT REPLACEMENT      KNEE SURGERY Left 2015       Patient assessed for rehabilitation services?: Yes  Family/Caregiver Present: No    Restrictions:  Restrictions/Precautions: Contact Precautions;Fall Risk  Position Activity Restriction  Other Position/Activity Restrictions: WBAT per podiatry note     SUBJECTIVE:   Pain   Denies pain       Prior Level of Function:  Social/Functional History  Lives With: Spouse, Daughter  Type of Home: House  Home Layout: Multi-level, 
Pt arrived via cart able to transfer to bed assisted. A&Ox4 with complaints of pain on tailbone due to fall 10/10. Assessment completed at this time. MD at bedside.    Tele box rhythm and rate verified with tele monitor tech via phone and with pt wristband.  
based on clinical progression. I am managing a portion of pt care. Some medical issues are handled by other specialists. Additional work up and treatment should be done in out pt setting by pt PCP and other out pt providers.      In addition to examining and evaluating pt, I spent additional time explaining care, normaland abnormal findings, and treatment plan. All of pt questions were answered. Counseling, diet and education were provided. Case will be discussed with nursing staff when appropriate. Family will be updated if and when appropriate.       Electronically signed by Alaina Blackwell DO on 5/28/2025 at 10:15 AM

## 2025-05-31 LAB
BACTERIA BLD CULT ORG #2: NORMAL
BACTERIA BLD CULT: NORMAL

## 2025-06-03 ENCOUNTER — OFFICE VISIT (OUTPATIENT)
Dept: FAMILY MEDICINE CLINIC | Age: 60
End: 2025-06-03
Payer: COMMERCIAL

## 2025-06-03 VITALS
DIASTOLIC BLOOD PRESSURE: 84 MMHG | OXYGEN SATURATION: 98 % | BODY MASS INDEX: 24.81 KG/M2 | WEIGHT: 177.2 LBS | HEART RATE: 84 BPM | SYSTOLIC BLOOD PRESSURE: 136 MMHG | TEMPERATURE: 98 F | HEIGHT: 71 IN

## 2025-06-03 DIAGNOSIS — E11.65 UNCONTROLLED TYPE 2 DIABETES MELLITUS WITH HYPERGLYCEMIA (HCC): ICD-10-CM

## 2025-06-03 DIAGNOSIS — Z09 HOSPITAL DISCHARGE FOLLOW-UP: ICD-10-CM

## 2025-06-03 DIAGNOSIS — M86.371 CHRONIC MULTIFOCAL OSTEOMYELITIS, RIGHT ANKLE AND FOOT (HCC): ICD-10-CM

## 2025-06-03 DIAGNOSIS — L08.9 TYPE 2 DIABETES MELLITUS WITH RIGHT DIABETIC FOOT INFECTION (HCC): Primary | ICD-10-CM

## 2025-06-03 DIAGNOSIS — E11.628 TYPE 2 DIABETES MELLITUS WITH RIGHT DIABETIC FOOT INFECTION (HCC): Primary | ICD-10-CM

## 2025-06-03 DIAGNOSIS — R53.83 OTHER FATIGUE: ICD-10-CM

## 2025-06-03 DIAGNOSIS — M53.3 SACRAL PAIN: ICD-10-CM

## 2025-06-03 DIAGNOSIS — R55 SYNCOPE, UNSPECIFIED SYNCOPE TYPE: ICD-10-CM

## 2025-06-03 PROBLEM — L02.512 ABSCESS OF LEFT HAND: Status: RESOLVED | Noted: 2023-11-29 | Resolved: 2025-06-03

## 2025-06-03 LAB
EKG ATRIAL RATE: 94 BPM
EKG DIAGNOSIS: NORMAL
EKG P AXIS: 83 DEGREES
EKG P-R INTERVAL: 146 MS
EKG Q-T INTERVAL: 328 MS
EKG QRS DURATION: 70 MS
EKG QTC CALCULATION (BAZETT): 410 MS
EKG R AXIS: -7 DEGREES
EKG T AXIS: -9 DEGREES
EKG VENTRICULAR RATE: 94 BPM
HBA1C MFR BLD: 10.2 %

## 2025-06-03 PROCEDURE — 99215 OFFICE O/P EST HI 40 MIN: CPT | Performed by: NURSE PRACTITIONER

## 2025-06-03 PROCEDURE — 1111F DSCHRG MED/CURRENT MED MERGE: CPT | Performed by: NURSE PRACTITIONER

## 2025-06-03 PROCEDURE — 83036 HEMOGLOBIN GLYCOSYLATED A1C: CPT | Performed by: NURSE PRACTITIONER

## 2025-06-03 PROCEDURE — 96372 THER/PROPH/DIAG INJ SC/IM: CPT | Performed by: NURSE PRACTITIONER

## 2025-06-03 RX ORDER — CYANOCOBALAMIN 1000 UG/ML
1000 INJECTION, SOLUTION INTRAMUSCULAR; SUBCUTANEOUS ONCE
Status: COMPLETED | OUTPATIENT
Start: 2025-06-03 | End: 2025-06-03

## 2025-06-03 RX ORDER — LEVOFLOXACIN 750 MG/1
1 TABLET, FILM COATED ORAL DAILY
COMMUNITY
Start: 2025-03-11 | End: 2025-06-03 | Stop reason: ALTCHOICE

## 2025-06-03 RX ORDER — NAPROXEN 500 MG/1
500 TABLET ORAL
COMMUNITY
Start: 2025-05-08 | End: 2025-06-03 | Stop reason: ALTCHOICE

## 2025-06-03 RX ORDER — KETOROLAC TROMETHAMINE 10 MG/1
10 TABLET, FILM COATED ORAL EVERY 6 HOURS PRN
Qty: 20 TABLET | Refills: 0 | Status: SHIPPED | OUTPATIENT
Start: 2025-06-03 | End: 2026-06-03

## 2025-06-03 RX ADMIN — CYANOCOBALAMIN 1000 MCG: 1000 INJECTION, SOLUTION INTRAMUSCULAR; SUBCUTANEOUS at 11:16

## 2025-06-03 ASSESSMENT — ENCOUNTER SYMPTOMS
BLOOD IN STOOL: 0
CHEST TIGHTNESS: 0
COLOR CHANGE: 0
ABDOMINAL PAIN: 0
DIARRHEA: 0
COUGH: 0
CONSTIPATION: 0
NAUSEA: 0
BACK PAIN: 1
VOMITING: 0
SHORTNESS OF BREATH: 0

## 2025-06-03 NOTE — PROGRESS NOTES
.After obtaining consent, and per orders of Geovanni Ford,Segundo injection of B-12  given in RT deltoid by Nicole Navarrete MA. Patient instructed to remain in clinic for 20 minutes afterwards, and to report any adverse reaction to me immediately.Tolerated well

## 2025-06-03 NOTE — PROGRESS NOTES
Date of Visit:  6/3/2025  Patient Name: Tee Jj   Patient :  1965     CHIEF COMPLAINT:     Tee Jj is a 59 y.o. male who presents today for an general visit to be evaluated for the following condition(s):  Chief Complaint   Patient presents with    Follow-up     Discuss Handicap placard  25- Per note presents with syncope, dizziness, frequent falls. He is complaining of pain in his coccyx from the fall. He states is feeling much better. Pt is eating/ drinking more. Denies HA, recent falls, lightheaded. He would like to discuss B-12 shot. He did monitored BP at home after /82.        HISTORY OF PRESENT ILLNESS     I reviewed staff HPI/chief complaint and do agree with above    Subjective:  - Patient presents for hospital follow-up with history of syncope episodes with falls. Patient reports hitting tailbone during one fall, causing ongoing discomfort and limiting sleep positions to right side only. Left hip also painful from fall.  - Patient was hospitalized for multiple issues including parainfluenza, acute gastritis, diarrhea, and sinusitis which likely contributed to dehydration and subsequent syncopal episodes.  Patient also did have positive urine drug screen noted in the hospital.  - Patient does have history of type 2 diabetes, last hemoglobin A1c 9.0% 3/2024, did have noted elevation of blood glucose levels while in the hospital which has improved since being home  - Patient was diagnosed with osteomyelitis in the second digit and second metatarsal of the right foot during hospitalization.  Was treated with IV Rocephin while in the hospital and discharged on Keflex 3 times daily which she does continue to take.  Does have follow-up with infectious disease upcoming approximately 2 weeks after discharge from hospital.  - He does continue to see podiatry through the [facility] and states they will be planning further amputation of the second digit in parts of the

## 2025-06-05 ENCOUNTER — APPOINTMENT (OUTPATIENT)
Dept: ORTHOPEDIC SURGERY | Facility: CLINIC | Age: 60
End: 2025-06-05
Payer: COMMERCIAL

## 2025-06-24 ENCOUNTER — TELEPHONE (OUTPATIENT)
Dept: FAMILY MEDICINE CLINIC | Age: 60
End: 2025-06-24

## 2025-06-25 DIAGNOSIS — E11.65 TYPE 2 DIABETES MELLITUS WITH HYPERGLYCEMIA, WITH LONG-TERM CURRENT USE OF INSULIN (HCC): Primary | ICD-10-CM

## 2025-06-25 DIAGNOSIS — Z79.4 TYPE 2 DIABETES MELLITUS WITH HYPERGLYCEMIA, WITH LONG-TERM CURRENT USE OF INSULIN (HCC): Primary | ICD-10-CM

## 2025-06-26 NOTE — TELEPHONE ENCOUNTER
We will try to adjust the medication to see if insurance will cover Trulicity.  New prescription was sent in

## 2025-07-03 ENCOUNTER — PREP FOR PROCEDURE (OUTPATIENT)
Dept: GASTROENTEROLOGY | Age: 60
End: 2025-07-03

## 2025-07-07 ENCOUNTER — OFFICE VISIT (OUTPATIENT)
Dept: FAMILY MEDICINE CLINIC | Age: 60
End: 2025-07-07
Payer: COMMERCIAL

## 2025-07-07 VITALS
HEIGHT: 71 IN | HEART RATE: 57 BPM | WEIGHT: 180 LBS | TEMPERATURE: 97.1 F | BODY MASS INDEX: 25.2 KG/M2 | DIASTOLIC BLOOD PRESSURE: 60 MMHG | OXYGEN SATURATION: 99 % | SYSTOLIC BLOOD PRESSURE: 90 MMHG

## 2025-07-07 DIAGNOSIS — E11.628 TYPE 2 DIABETES MELLITUS WITH RIGHT DIABETIC FOOT INFECTION (HCC): Primary | ICD-10-CM

## 2025-07-07 DIAGNOSIS — S98.111A AMPUTATION OF RIGHT GREAT TOE: ICD-10-CM

## 2025-07-07 DIAGNOSIS — L08.9 TYPE 2 DIABETES MELLITUS WITH RIGHT DIABETIC FOOT INFECTION (HCC): Primary | ICD-10-CM

## 2025-07-07 LAB — HBA1C MFR BLD: 9.4 %

## 2025-07-07 PROCEDURE — G8419 CALC BMI OUT NRM PARAM NOF/U: HCPCS | Performed by: NURSE PRACTITIONER

## 2025-07-07 PROCEDURE — 2022F DILAT RTA XM EVC RTNOPTHY: CPT | Performed by: NURSE PRACTITIONER

## 2025-07-07 PROCEDURE — 3017F COLORECTAL CA SCREEN DOC REV: CPT | Performed by: NURSE PRACTITIONER

## 2025-07-07 PROCEDURE — 99213 OFFICE O/P EST LOW 20 MIN: CPT | Performed by: NURSE PRACTITIONER

## 2025-07-07 PROCEDURE — 3078F DIAST BP <80 MM HG: CPT | Performed by: NURSE PRACTITIONER

## 2025-07-07 PROCEDURE — G8427 DOCREV CUR MEDS BY ELIG CLIN: HCPCS | Performed by: NURSE PRACTITIONER

## 2025-07-07 PROCEDURE — 3074F SYST BP LT 130 MM HG: CPT | Performed by: NURSE PRACTITIONER

## 2025-07-07 PROCEDURE — 4004F PT TOBACCO SCREEN RCVD TLK: CPT | Performed by: NURSE PRACTITIONER

## 2025-07-07 PROCEDURE — 3046F HEMOGLOBIN A1C LEVEL >9.0%: CPT | Performed by: NURSE PRACTITIONER

## 2025-07-07 PROCEDURE — 83036 HEMOGLOBIN GLYCOSYLATED A1C: CPT | Performed by: NURSE PRACTITIONER

## 2025-07-07 RX ORDER — SODIUM CHLORIDE 0.9 % (FLUSH) 0.9 %
5-40 SYRINGE (ML) INJECTION EVERY 12 HOURS SCHEDULED
Status: CANCELLED | OUTPATIENT
Start: 2025-07-07

## 2025-07-07 RX ORDER — SODIUM CHLORIDE 0.9 % (FLUSH) 0.9 %
5-40 SYRINGE (ML) INJECTION PRN
Status: CANCELLED | OUTPATIENT
Start: 2025-07-07

## 2025-07-07 RX ORDER — SODIUM CHLORIDE 9 MG/ML
INJECTION, SOLUTION INTRAVENOUS CONTINUOUS
Status: CANCELLED | OUTPATIENT
Start: 2025-07-07

## 2025-07-07 RX ORDER — GABAPENTIN 300 MG/1
600 CAPSULE ORAL 3 TIMES DAILY
Qty: 120 CAPSULE | Refills: 2 | Status: SHIPPED | OUTPATIENT
Start: 2025-07-07 | End: 2025-09-05

## 2025-07-07 RX ORDER — SODIUM CHLORIDE 9 MG/ML
INJECTION, SOLUTION INTRAVENOUS PRN
Status: CANCELLED | OUTPATIENT
Start: 2025-07-07

## 2025-07-07 ASSESSMENT — ENCOUNTER SYMPTOMS
ABDOMINAL PAIN: 0
BLOOD IN STOOL: 0
CONSTIPATION: 0
NAUSEA: 0
VOMITING: 0
COUGH: 0
DIARRHEA: 0
CHEST TIGHTNESS: 0
SHORTNESS OF BREATH: 0
COLOR CHANGE: 0

## 2025-07-07 NOTE — PROGRESS NOTES
Date of Visit:  2025  Patient Name: Tee Jj   Patient :  1965     CHIEF COMPLAINT:     Tee Jj is a 59 y.o. male who presents today for an general visit to be evaluated for the following condition(s):  Chief Complaint   Patient presents with    Dizziness     Follow up, states that he is doing better.    Health Maintenance     Colonoscopy sched 25       HISTORY OF PRESENT ILLNESS     I reviewed staff HPI/chief complaint and do agree with above    Patient presents today for follow-up with type 2 diabetes mellitus with osteomyelitis of the right foot    Subjective:  - Reports starting Trulicity last week and has taken one dose so far. The dizziness experienced a couple of weeks ago has resolved since starting gabapentin. Reports nocturia, getting up three to four times per night to urinate. Experiences no daytime urinary symptoms, such as difficulty initiating urination or a weak stream. Is becoming more serious about managing diet and blood glucose levels to prevent future diabetic complications                 REVIEW OF SYSTEM      Review of Systems   Constitutional:  Negative for activity change, appetite change, chills, fatigue and fever.   Respiratory:  Negative for cough, chest tightness and shortness of breath.    Cardiovascular:  Negative for chest pain, palpitations and leg swelling.   Gastrointestinal:  Negative for abdominal pain, blood in stool, constipation, diarrhea, nausea and vomiting.   Endocrine: Negative for polydipsia, polyphagia and polyuria.   Musculoskeletal:  Negative for arthralgias, joint swelling and myalgias.   Skin:  Negative for color change and rash.   Neurological:  Negative for dizziness, weakness, light-headedness, numbness and headaches.   Hematological:  Does not bruise/bleed easily.       REVIEWED INFORMATION      No Known Allergies    Patient Active Problem List   Diagnosis    Lumbosacral spondylosis without myelopathy    Essential

## 2025-08-28 DIAGNOSIS — L08.9 TYPE 2 DIABETES MELLITUS WITH RIGHT DIABETIC FOOT INFECTION (HCC): ICD-10-CM

## 2025-08-28 DIAGNOSIS — S98.111A AMPUTATION OF RIGHT GREAT TOE: ICD-10-CM

## 2025-08-28 DIAGNOSIS — E11.628 TYPE 2 DIABETES MELLITUS WITH RIGHT DIABETIC FOOT INFECTION (HCC): ICD-10-CM

## 2025-08-28 RX ORDER — GABAPENTIN 300 MG/1
600 CAPSULE ORAL 3 TIMES DAILY
Qty: 120 CAPSULE | Refills: 2 | Status: SHIPPED | OUTPATIENT
Start: 2025-08-28 | End: 2025-10-27

## 2025-08-28 RX ORDER — HYDROCHLOROTHIAZIDE 12.5 MG/1
1 CAPSULE ORAL
Qty: 6 EACH | Refills: 3 | Status: SHIPPED | OUTPATIENT
Start: 2025-08-28

## (undated) DEVICE — NEEDLE, SAFETY, 25 GA X 1.5 IN

## (undated) DEVICE — SHEET,DRAPE,53X77,STERILE: Brand: MEDLINE

## (undated) DEVICE — TOWELS 4-PK

## (undated) DEVICE — COTTON UNDERCAST PADDING,REGULAR FINISH: Brand: WEBRIL

## (undated) DEVICE — GAUZE,PACKING STRIP,IODOFORM,1/2"X5YD,ST: Brand: CURAD

## (undated) DEVICE — SPONGE GZ W4XL4IN RAYON POLY CVR W/NONWOVEN FAB STRL 2/PK

## (undated) DEVICE — TUBESET, CYLINDRICAL PROBE, TUNNELVAC

## (undated) DEVICE — DRAPE, SHEET, EXTREMITY, W/ARM BOARD COVERS, 87 X 106 X 128 IN, DISPOSABLE, LF, STERILE

## (undated) DEVICE — COVER LT HNDL BLU PLAS

## (undated) DEVICE — GLOVE ORTHO 8   MSG9480

## (undated) DEVICE — TRAY, SKIN SCRUB, WET PREP, WITH 4 COMPARMENT

## (undated) DEVICE — GOWN, SURGICAL, ROYAL SILK, XL, STERILE

## (undated) DEVICE — TOWEL,OR,DSP,ST,BLUE,STD,4/PK,20PK/CS: Brand: MEDLINE

## (undated) DEVICE — BANDAGE COMPR W2INXL5.5YD BGE POLY COT BLEND YARN HNY STRP

## (undated) DEVICE — BANDAGE,GAUZE,CONFORMING,4"X75",STRL,LF: Brand: MEDLINE

## (undated) DEVICE — DRESSING, NON-ADHERENT, TELFA, OUCHLESS, 3 X 8 IN, STERILE

## (undated) DEVICE — GLOVE SURG SZ 65 THK91MIL LTX FREE SYN POLYISOPRENE

## (undated) DEVICE — STRAP, VELCRO, BODY, 4 X 60IN, NS

## (undated) DEVICE — GLOVE, SURGICAL, PROTEXIS PI , 7.0, PF, LF

## (undated) DEVICE — YANKAUER,SMOOTH HANDLE,HIGH CAPACITY: Brand: MEDLINE INDUSTRIES, INC.

## (undated) DEVICE — SYRINGE, CONTROL, ANGIOGRAPHIC, FIXED MALE LUER, 10 CC

## (undated) DEVICE — DRAPE, SHEET, 17 X 23 IN

## (undated) DEVICE — CUP, MEDICINE, GRADUATED, 2 OZ, PLASTIC, DISP, LF

## (undated) DEVICE — 1010 S-DRAPE TOWEL DRAPE 10/BX: Brand: STERI-DRAPE™

## (undated) DEVICE — GLOVE ORANGE PI 8 1/2   MSG9085

## (undated) DEVICE — SUTURE, ETHILON, 2-0, FSLX 30, BLACK

## (undated) DEVICE — GLOVE SURG SZ 65 L12IN FNGR THK79MIL GRN LTX FREE

## (undated) DEVICE — GLOVE ORANGE PI 7   MSG9070

## (undated) DEVICE — BANDAGE, ESMARK 4 IN X 9 FT, STERILE

## (undated) DEVICE — DRAPE,HAND,STERILE: Brand: MEDLINE

## (undated) DEVICE — DRAPE, SHEET, U, W/ADHESIVE STRIP, IMPERVIOUS, 60 X 70 IN, DISPOSABLE, LF, STERILE

## (undated) DEVICE — INTENDED FOR TISSUE SEPARATION, AND OTHER PROCEDURES THAT REQUIRE A SHARP SURGICAL BLADE TO PUNCTURE OR CUT.: Brand: BARD-PARKER ® CARBON RIB-BACK BLADES

## (undated) DEVICE — MINOR: Brand: MEDLINE INDUSTRIES, INC.

## (undated) DEVICE — Device

## (undated) DEVICE — GLOVE ORANGE PI 7 1/2   MSG9075

## (undated) DEVICE — BLADE, OSCILLATING/SAGITTAL, 25MM X 9MM

## (undated) DEVICE — GOWN, SURGICAL, ROYAL SILK, LG, STERILE

## (undated) DEVICE — GOWN,SIRUS,NON REINFRCD,LARGE,SET IN SL: Brand: MEDLINE